# Patient Record
Sex: FEMALE | Race: WHITE | NOT HISPANIC OR LATINO | Employment: OTHER | URBAN - NONMETROPOLITAN AREA
[De-identification: names, ages, dates, MRNs, and addresses within clinical notes are randomized per-mention and may not be internally consistent; named-entity substitution may affect disease eponyms.]

---

## 2019-10-21 ENCOUNTER — LAB (OUTPATIENT)
Dept: LAB | Facility: OTHER | Age: 69
End: 2019-10-21

## 2019-10-21 ENCOUNTER — OFFICE VISIT (OUTPATIENT)
Dept: FAMILY MEDICINE CLINIC | Facility: CLINIC | Age: 69
End: 2019-10-21

## 2019-10-21 VITALS
BODY MASS INDEX: 34.09 KG/M2 | WEIGHT: 217.2 LBS | DIASTOLIC BLOOD PRESSURE: 90 MMHG | HEIGHT: 67 IN | SYSTOLIC BLOOD PRESSURE: 182 MMHG

## 2019-10-21 DIAGNOSIS — R35.1 NOCTURIA: ICD-10-CM

## 2019-10-21 DIAGNOSIS — M25.551 RIGHT HIP PAIN: ICD-10-CM

## 2019-10-21 DIAGNOSIS — I10 ESSENTIAL HYPERTENSION: Primary | ICD-10-CM

## 2019-10-21 DIAGNOSIS — E78.5 HYPERLIPIDEMIA, UNSPECIFIED HYPERLIPIDEMIA TYPE: ICD-10-CM

## 2019-10-21 DIAGNOSIS — E53.8 VITAMIN B12 DEFICIENCY: ICD-10-CM

## 2019-10-21 DIAGNOSIS — E03.9 HYPOTHYROIDISM, UNSPECIFIED TYPE: ICD-10-CM

## 2019-10-21 DIAGNOSIS — F32.1 CURRENT MODERATE EPISODE OF MAJOR DEPRESSIVE DISORDER WITHOUT PRIOR EPISODE (HCC): ICD-10-CM

## 2019-10-21 DIAGNOSIS — E55.9 VITAMIN D DEFICIENCY: ICD-10-CM

## 2019-10-21 LAB
ALBUMIN SERPL-MCNC: 4.2 G/DL (ref 3.5–5)
ALBUMIN/GLOB SERPL: 1.2 G/DL (ref 1.1–1.8)
ALP SERPL-CCNC: 63 U/L (ref 38–126)
ALT SERPL W P-5'-P-CCNC: 22 U/L
ANION GAP SERPL CALCULATED.3IONS-SCNC: 9 MMOL/L (ref 5–15)
AST SERPL-CCNC: 25 U/L (ref 14–36)
BACTERIA UR QL AUTO: ABNORMAL /HPF
BILIRUB SERPL-MCNC: 0.5 MG/DL (ref 0.2–1.3)
BILIRUB UR QL STRIP: NEGATIVE
BUN BLD-MCNC: 16 MG/DL (ref 7–23)
BUN/CREAT SERPL: 17.4 (ref 7–25)
CALCIUM SPEC-SCNC: 9.3 MG/DL (ref 8.4–10.2)
CHLORIDE SERPL-SCNC: 105 MMOL/L (ref 101–112)
CHOLEST SERPL-MCNC: 194 MG/DL (ref 150–200)
CLARITY UR: ABNORMAL
CO2 SERPL-SCNC: 29 MMOL/L (ref 22–30)
COLOR UR: YELLOW
CREAT BLD-MCNC: 0.92 MG/DL (ref 0.52–1.04)
DEPRECATED RDW RBC AUTO: 46.1 FL (ref 37–54)
EOSINOPHIL # BLD MANUAL: 0.36 10*3/MM3 (ref 0–0.4)
EOSINOPHIL NFR BLD MANUAL: 4 % (ref 0.3–6.2)
ERYTHROCYTE [DISTWIDTH] IN BLOOD BY AUTOMATED COUNT: 14.5 % (ref 12.3–15.4)
GFR SERPL CREATININE-BSD FRML MDRD: 61 ML/MIN/1.73 (ref 45–104)
GLOBULIN UR ELPH-MCNC: 3.4 GM/DL (ref 2.3–3.5)
GLUCOSE BLD-MCNC: 104 MG/DL (ref 70–99)
GLUCOSE UR STRIP-MCNC: NEGATIVE MG/DL
HCT VFR BLD AUTO: 42.8 % (ref 34–46.6)
HDLC SERPL-MCNC: 29 MG/DL (ref 40–59)
HGB BLD-MCNC: 13.6 G/DL (ref 12–15.9)
HGB UR QL STRIP.AUTO: NEGATIVE
HYALINE CASTS UR QL AUTO: ABNORMAL /LPF
KETONES UR QL STRIP: NEGATIVE
LDLC SERPL CALC-MCNC: 120 MG/DL
LDLC/HDLC SERPL: 4.13 {RATIO} (ref 0–3.22)
LEUKOCYTE ESTERASE UR QL STRIP.AUTO: ABNORMAL
LYMPHOCYTES # BLD MANUAL: 2.93 10*3/MM3 (ref 0.7–3.1)
LYMPHOCYTES NFR BLD MANUAL: 33 % (ref 19.6–45.3)
LYMPHOCYTES NFR BLD MANUAL: 6 % (ref 5–12)
MCH RBC QN AUTO: 28.2 PG (ref 26.6–33)
MCHC RBC AUTO-ENTMCNC: 31.8 G/DL (ref 31.5–35.7)
MCV RBC AUTO: 88.6 FL (ref 79–97)
MONOCYTES # BLD AUTO: 0.53 10*3/MM3 (ref 0.1–0.9)
MUCOUS THREADS URNS QL MICRO: ABNORMAL /HPF
NEUTROPHILS # BLD AUTO: 5.07 10*3/MM3 (ref 1.7–7)
NEUTROPHILS NFR BLD MANUAL: 57 % (ref 42.7–76)
NITRITE UR QL STRIP: NEGATIVE
PH UR STRIP.AUTO: 5.5 [PH] (ref 5.5–8)
PLATELET # BLD AUTO: 248 10*3/MM3 (ref 140–450)
PMV BLD AUTO: 9.5 FL (ref 6–12)
POTASSIUM BLD-SCNC: 4.6 MMOL/L (ref 3.4–5)
PROT SERPL-MCNC: 7.6 G/DL (ref 6.3–8.6)
PROT UR QL STRIP: NEGATIVE
RBC # BLD AUTO: 4.83 10*6/MM3 (ref 3.77–5.28)
RBC # UR: ABNORMAL /HPF
RBC MORPH BLD: NORMAL
REF LAB TEST METHOD: ABNORMAL
SMALL PLATELETS BLD QL SMEAR: ADEQUATE
SODIUM BLD-SCNC: 143 MMOL/L (ref 137–145)
SP GR UR STRIP: 1.02 (ref 1–1.03)
SQUAMOUS #/AREA URNS HPF: ABNORMAL /HPF
TRIGL SERPL-MCNC: 226 MG/DL
UROBILINOGEN UR QL STRIP: ABNORMAL
VLDLC SERPL-MCNC: 45.2 MG/DL
WBC MORPH BLD: NORMAL
WBC NRBC COR # BLD: 8.89 10*3/MM3 (ref 3.4–10.8)
WBC UR QL AUTO: ABNORMAL /HPF

## 2019-10-21 PROCEDURE — 96372 THER/PROPH/DIAG INJ SC/IM: CPT | Performed by: FAMILY MEDICINE

## 2019-10-21 PROCEDURE — 84443 ASSAY THYROID STIM HORMONE: CPT | Performed by: FAMILY MEDICINE

## 2019-10-21 PROCEDURE — 80061 LIPID PANEL: CPT | Performed by: FAMILY MEDICINE

## 2019-10-21 PROCEDURE — 80053 COMPREHEN METABOLIC PANEL: CPT | Performed by: FAMILY MEDICINE

## 2019-10-21 PROCEDURE — 87077 CULTURE AEROBIC IDENTIFY: CPT | Performed by: FAMILY MEDICINE

## 2019-10-21 PROCEDURE — 87086 URINE CULTURE/COLONY COUNT: CPT | Performed by: FAMILY MEDICINE

## 2019-10-21 PROCEDURE — 85025 COMPLETE CBC W/AUTO DIFF WBC: CPT | Performed by: FAMILY MEDICINE

## 2019-10-21 PROCEDURE — 82306 VITAMIN D 25 HYDROXY: CPT | Performed by: FAMILY MEDICINE

## 2019-10-21 PROCEDURE — 82607 VITAMIN B-12: CPT | Performed by: FAMILY MEDICINE

## 2019-10-21 PROCEDURE — 84439 ASSAY OF FREE THYROXINE: CPT | Performed by: FAMILY MEDICINE

## 2019-10-21 PROCEDURE — 99203 OFFICE O/P NEW LOW 30 MIN: CPT | Performed by: FAMILY MEDICINE

## 2019-10-21 PROCEDURE — 36415 COLL VENOUS BLD VENIPUNCTURE: CPT | Performed by: FAMILY MEDICINE

## 2019-10-21 PROCEDURE — 81001 URINALYSIS AUTO W/SCOPE: CPT | Performed by: FAMILY MEDICINE

## 2019-10-21 PROCEDURE — 82746 ASSAY OF FOLIC ACID SERUM: CPT | Performed by: FAMILY MEDICINE

## 2019-10-21 PROCEDURE — 87186 SC STD MICRODIL/AGAR DIL: CPT | Performed by: FAMILY MEDICINE

## 2019-10-21 RX ORDER — ESCITALOPRAM OXALATE 10 MG/1
10 TABLET ORAL DAILY
Qty: 30 TABLET | Refills: 5 | Status: SHIPPED | OUTPATIENT
Start: 2019-10-21 | End: 2020-04-22

## 2019-10-21 RX ORDER — LOVASTATIN 40 MG/1
40 TABLET ORAL NIGHTLY
COMMUNITY
End: 2020-03-18 | Stop reason: SDUPTHER

## 2019-10-21 RX ORDER — METHYLPREDNISOLONE ACETATE 80 MG/ML
80 INJECTION, SUSPENSION INTRA-ARTICULAR; INTRALESIONAL; INTRAMUSCULAR; SOFT TISSUE ONCE
Status: COMPLETED | OUTPATIENT
Start: 2019-10-21 | End: 2019-10-21

## 2019-10-21 RX ORDER — CHOLECALCIFEROL (VITAMIN D3) 1250 MCG
CAPSULE ORAL
COMMUNITY
End: 2019-10-22 | Stop reason: SDUPTHER

## 2019-10-21 RX ORDER — LEVOTHYROXINE SODIUM 137 UG/1
137 TABLET ORAL DAILY
COMMUNITY
End: 2020-03-03 | Stop reason: SDUPTHER

## 2019-10-21 RX ORDER — OLMESARTAN MEDOXOMIL 40 MG/1
40 TABLET ORAL DAILY
Qty: 30 TABLET | Refills: 5 | Status: SHIPPED | OUTPATIENT
Start: 2019-10-21 | End: 2020-01-27

## 2019-10-21 RX ADMIN — METHYLPREDNISOLONE ACETATE 80 MG: 80 INJECTION, SUSPENSION INTRA-ARTICULAR; INTRALESIONAL; INTRAMUSCULAR; SOFT TISSUE at 09:47

## 2019-10-21 NOTE — PROGRESS NOTES
Subjective   Marielle Hansen is a 69 y.o. female who presents to the office to establish care.  Her sister is with her today.  The patient's  and daughter passed away within the past couple of years.  She has recently moved in with her sister, who is a regular patient of mine and she is here to establish care.  They have a few concerns.  Her right hip is been giving her some pain and sometimes it goes down into the leg.  She had both knees replaced.  She wondered about getting a steroid shot as this usually helped with the knee pain.  She had been doing a lot of work while moving and thinks it may have flared it up.  She has had a significant weight gain and is not sleeping since she lost her daughter and .  She has not had labs done in a while and is due for them.  Her blood pressure has gone up significantly as her weight has.  She has a history of vitamin D and B12 deficiency.    She is been having to get up at night to urinate several times over the past few months.    History of Present Illness   Hypertension   This is a new problem. The current episode started in the past year. The problem has been waxing and waning since onset. Associated symptoms include anxiety, headaches and malaise/fatigue. Pertinent negatives include no blurred vision, chest pain, neck pain, orthopnea, palpitations, peripheral edema, PND, shortness of breath or sweats. There are no associated agents to hypertension. Risk factors for coronary artery disease include dyslipidemia and family history.  She had been on ACE inhibitor several years ago but it caused her to cough.    The following portions of the patient's history were reviewed and updated as appropriate: allergies, current medications, past family history, past medical history, past social history, past surgical history and problem list.    Review of Systems   Constitutional: Positive for activity change, fatigue and unexpected weight change.   HENT:  Negative.    Respiratory: Negative.  Negative for shortness of breath.    Cardiovascular: Negative.  Negative for chest pain.   Gastrointestinal: Negative.    Musculoskeletal: Positive for arthralgias, back pain, gait problem and myalgias.   Skin: Negative.    Allergic/Immunologic: Negative for immunocompromised state.   Neurological: Negative for dizziness, tremors, seizures, syncope, weakness and numbness.   Hematological: Negative.    Psychiatric/Behavioral: Positive for dysphoric mood and sleep disturbance. Negative for agitation and confusion. The patient is nervous/anxious.    All other systems reviewed and are negative.      Objective   Physical Exam   Constitutional: She is oriented to person, place, and time. She appears well-developed and well-nourished.   Obese.   HENT:   Head: Normocephalic and atraumatic.   Nose: Nose normal.   Mouth/Throat: Oropharynx is clear and moist.   Eyes: Conjunctivae and EOM are normal. Pupils are equal, round, and reactive to light.   Neck: Normal range of motion. Neck supple. No JVD present. No tracheal deviation present. No thyromegaly present.   Cardiovascular: Normal rate, regular rhythm, normal heart sounds and intact distal pulses.   No murmur heard.  Pulmonary/Chest: Effort normal and breath sounds normal. She has no wheezes.   Abdominal: Soft. Bowel sounds are normal. She exhibits no distension. There is no tenderness.   Musculoskeletal: Normal range of motion. She exhibits no edema.   Tender over the right hip, right low back.   Lymphadenopathy:     She has no cervical adenopathy.   Neurological: She is alert and oriented to person, place, and time. Coordination normal.   Skin: Skin is warm and dry. No rash noted.   Psychiatric: She has a normal mood and affect.   Nursing note and vitals reviewed.      Assessment/Plan   Marielle was seen today for establish care.    Diagnoses and all orders for this visit:    Essential hypertension  -     Comprehensive Metabolic  Panel    Hyperlipidemia, unspecified hyperlipidemia type  -     Lipid Panel; Future    Hypothyroidism, unspecified type  -     TSH  -     T4, Free    Right hip pain  -     XR Hip With or Without Pelvis 2 - 3 View Right; Future  -     methylPREDNISolone acetate (DEPO-medrol) injection 80 mg    Current moderate episode of major depressive disorder without prior episode (CMS/HCC)    Vitamin D deficiency  -     Vitamin D 25 Hydroxy    Vitamin B12 deficiency  -     CBC & Differential; Future  -     Vitamin B12 & Folate    Nocturia  -     Urinalysis With Culture If Indicated -; Future    Other orders  -     olmesartan (BENICAR) 40 MG tablet; Take 1 tablet by mouth Daily.  -     escitalopram (LEXAPRO) 10 MG tablet; Take 1 tablet by mouth Daily.    Will start on the losartan for hypertension.  Recheck with me in 4 weeks and they are to measure at home.    Suspect trochanteric bursitis and gave a handout on exercises.  Give Depo-Medrol 80 mg on the right side and she will check back with me in a month.  We will also x-ray the right hip and follow-up accordingly.    Recheck for lipids, hypothyroidism with labs today and for now we will continue current meds.    We will check a urinalysis as above and follow-up accordingly.  The nocturia and frequency may have something to do with her weight gain.    We will also recheck vitamin D and B12 due to deficiency.    We will start Lexapro for depression symptoms related to complicated bereavement after loss of her family members.        This document has been electronically signed by Yoselin Rubio MD on October 21, 2019 5:03 PM

## 2019-10-22 DIAGNOSIS — M19.90 ARTHRITIS: Primary | ICD-10-CM

## 2019-10-22 LAB
25(OH)D3 SERPL-MCNC: 26.7 NG/ML (ref 30–100)
FOLATE SERPL-MCNC: 9.28 NG/ML (ref 4.78–24.2)
T4 FREE SERPL-MCNC: 0.74 NG/DL (ref 0.93–1.7)
TSH SERPL DL<=0.05 MIU/L-ACNC: 5.4 UIU/ML (ref 0.27–4.2)
VIT B12 BLD-MCNC: 402 PG/ML (ref 211–946)

## 2019-10-22 RX ORDER — CHOLECALCIFEROL (VITAMIN D3) 1250 MCG
50000 CAPSULE ORAL 2 TIMES WEEKLY
Qty: 8 CAPSULE | Refills: 5 | Status: SHIPPED | OUTPATIENT
Start: 2019-10-24 | End: 2020-07-21 | Stop reason: SDUPTHER

## 2019-10-22 NOTE — PROGRESS NOTES
Notify patient Vitamin D is deficient.  Needs vitamin D 50,000 units twice weekly for at least 6 months and recheck.  Has she been on her thyroid medicine regularly?  Her thyroid is showing its underactive.  If she has not missed any then we need to increase the dose to 150 mcg daily.  B12 is low normal.  Could try monthly shots if she would like.  Cholesterol is a little high we will talk about this when she comes back for her next visit, may be changing medications.

## 2019-10-23 LAB — BACTERIA SPEC AEROBE CULT: ABNORMAL

## 2019-11-04 ENCOUNTER — OFFICE VISIT (OUTPATIENT)
Dept: FAMILY MEDICINE CLINIC | Facility: CLINIC | Age: 69
End: 2019-11-04

## 2019-11-04 VITALS
DIASTOLIC BLOOD PRESSURE: 84 MMHG | BODY MASS INDEX: 34.06 KG/M2 | HEIGHT: 67 IN | SYSTOLIC BLOOD PRESSURE: 162 MMHG | WEIGHT: 217 LBS

## 2019-11-04 DIAGNOSIS — I10 ESSENTIAL HYPERTENSION: Primary | ICD-10-CM

## 2019-11-04 DIAGNOSIS — E53.9 VITAMIN B DEFICIENCY: ICD-10-CM

## 2019-11-04 DIAGNOSIS — G47.00 INSOMNIA, UNSPECIFIED TYPE: ICD-10-CM

## 2019-11-04 PROCEDURE — 99214 OFFICE O/P EST MOD 30 MIN: CPT | Performed by: FAMILY MEDICINE

## 2019-11-04 PROCEDURE — 96372 THER/PROPH/DIAG INJ SC/IM: CPT | Performed by: FAMILY MEDICINE

## 2019-11-04 RX ORDER — RAMELTEON 8 MG/1
8 TABLET ORAL NIGHTLY
Qty: 30 TABLET | Refills: 5 | Status: SHIPPED | OUTPATIENT
Start: 2019-11-04 | End: 2019-11-25

## 2019-11-04 RX ORDER — METOPROLOL SUCCINATE 25 MG/1
25 TABLET, EXTENDED RELEASE ORAL DAILY
Qty: 30 TABLET | Refills: 5 | Status: SHIPPED | OUTPATIENT
Start: 2019-11-04 | End: 2019-12-04 | Stop reason: SDUPTHER

## 2019-11-04 RX ORDER — CYANOCOBALAMIN 1000 UG/ML
1000 INJECTION, SOLUTION INTRAMUSCULAR; SUBCUTANEOUS
Status: DISCONTINUED | OUTPATIENT
Start: 2019-11-04 | End: 2020-06-19

## 2019-11-04 RX ADMIN — CYANOCOBALAMIN 1000 MCG: 1000 INJECTION, SOLUTION INTRAMUSCULAR; SUBCUTANEOUS at 09:55

## 2019-11-04 NOTE — PROGRESS NOTES
Subjective   Marielle Hansen is a 69 y.o. female who presents to the office today for 2-week follow-up on hypertension.  Her blood pressure is still a bit elevated but is actually better.  She is having a lot of trouble sleeping and would like to try something for insomnia    History of Present Illness   Hypertension   This is a new problem. The current episode started in the past year. The problem has been waxing and waning since onset. Associated symptoms include anxiety, headaches and malaise/fatigue. Pertinent negatives include no blurred vision, chest pain, neck pain, orthopnea, palpitations, peripheral edema, PND, shortness of breath or sweats. There are no associated agents to hypertension. Risk factors for coronary artery disease include dyslipidemia and family history.  She had been on ACE inhibitor several years ago but it caused her to cough.    The following portions of the patient's history were reviewed and updated as appropriate: allergies, current medications, past family history, past medical history, past social history, past surgical history and problem list.    Review of Systems   Constitutional: Positive for activity change, fatigue and unexpected weight change.   HENT: Negative.    Respiratory: Negative.  Negative for shortness of breath.    Cardiovascular: Negative.  Negative for chest pain.   Gastrointestinal: Negative.    Musculoskeletal: Positive for arthralgias, back pain, gait problem and myalgias.   Skin: Negative.    Allergic/Immunologic: Negative for immunocompromised state.   Neurological: Negative for dizziness, tremors, seizures, syncope, weakness and numbness.   Hematological: Negative.    Psychiatric/Behavioral: Positive for dysphoric mood and sleep disturbance. Negative for agitation and confusion. The patient is nervous/anxious.    All other systems reviewed and are negative.      Objective    Vitals:    11/04/19 0904   BP: 162/84   Weight: 98.4 kg (217 lb)   Height: 170.2  "cm (67\")   PainSc: 0-No pain     BMI 34  Physical Exam   Constitutional: She is oriented to person, place, and time. She appears well-developed and well-nourished.   Obese.   HENT:   Head: Normocephalic and atraumatic.   Nose: Nose normal.   Mouth/Throat: Oropharynx is clear and moist.   Eyes: Conjunctivae and EOM are normal. Pupils are equal, round, and reactive to light.   Neck: Normal range of motion. Neck supple. No JVD present. No tracheal deviation present. No thyromegaly present.   Cardiovascular: Normal rate, regular rhythm, normal heart sounds and intact distal pulses.   No murmur heard.  Pulmonary/Chest: Effort normal and breath sounds normal. She has no wheezes.   Abdominal: Soft. Bowel sounds are normal. She exhibits no distension. There is no tenderness.   Musculoskeletal: Normal range of motion. She exhibits no edema.   Tender over the right hip, right low back.   Lymphadenopathy:     She has no cervical adenopathy.   Neurological: She is alert and oriented to person, place, and time. Coordination normal.   Skin: Skin is warm and dry. No rash noted.   Psychiatric: She has a normal mood and affect.   Nursing note and vitals reviewed.      Assessment/Plan   Marielle was seen today for follow-up.    Diagnoses and all orders for this visit:    Essential hypertension    Insomnia, unspecified type    Vitamin B deficiency  -     cyanocobalamin injection 1,000 mcg    Other orders  -     ramelteon (ROZEREM) 8 MG tablet; Take 1 tablet by mouth Every Night.  -     metoprolol succinate XL (TOPROL XL) 25 MG 24 hr tablet; Take 1 tablet by mouth Daily.    Will add metoprolol XL for hypertension and recheck again in 3 weeks.    We will add Rozerem for insomnia    Continue B12 shots scheduled.        This document has been electronically signed by Yoselin Rubio MD on November 4, 2019 9:29 AM           "

## 2019-11-06 ENCOUNTER — OFFICE VISIT (OUTPATIENT)
Dept: ORTHOPEDIC SURGERY | Facility: CLINIC | Age: 69
End: 2019-11-06

## 2019-11-06 VITALS — HEIGHT: 67 IN | WEIGHT: 215.4 LBS | BODY MASS INDEX: 33.81 KG/M2

## 2019-11-06 DIAGNOSIS — M54.50 LOW BACK PAIN, UNSPECIFIED BACK PAIN LATERALITY, UNSPECIFIED CHRONICITY, UNSPECIFIED WHETHER SCIATICA PRESENT: ICD-10-CM

## 2019-11-06 DIAGNOSIS — M48.061 SPINAL STENOSIS OF LUMBAR REGION, UNSPECIFIED WHETHER NEUROGENIC CLAUDICATION PRESENT: ICD-10-CM

## 2019-11-06 DIAGNOSIS — M25.551 RIGHT HIP PAIN: Primary | ICD-10-CM

## 2019-11-06 DIAGNOSIS — M16.11 PRIMARY OSTEOARTHRITIS OF RIGHT HIP: ICD-10-CM

## 2019-11-06 PROCEDURE — 99203 OFFICE O/P NEW LOW 30 MIN: CPT | Performed by: ORTHOPAEDIC SURGERY

## 2019-11-06 NOTE — PROGRESS NOTES
Marielle Hansen is a 69 y.o. female   Primary provider:  Yoselin Rubio MD       Chief Complaint   Patient presents with   • Right Hip - Pain       HISTORY OF PRESENT ILLNESS: Patient is here today for right hip pain. Patient had xrays prior to appointment today. She states that her pain is not constant but at times so bad it makes her nauseated.  This been on for quite some time and limits her walking.  Is worse with weightbearing and somewhat better with rest.  She has to walk for a distance look at the store she describes bending over her grocery cart which gives her relief.  She quit smoking many years ago.  She has pain in the outer aspect of her right leg rating up to her back.  Minimal problem on the left side.    History of Present Illness     CONCURRENT MEDICAL HISTORY:    No past medical history on file.    No Known Allergies      Current Outpatient Medications:   •  Cholecalciferol (VITAMIN D3) 1.25 MG (76480 UT) capsule, Take 1 capsule by mouth 2 (Two) Times a Week., Disp: 8 capsule, Rfl: 5  •  escitalopram (LEXAPRO) 10 MG tablet, Take 1 tablet by mouth Daily., Disp: 30 tablet, Rfl: 5  •  levothyroxine (SYNTHROID, LEVOTHROID) 137 MCG tablet, Take 137 mcg by mouth Daily., Disp: , Rfl:   •  lovastatin (MEVACOR) 40 MG tablet, Take 40 mg by mouth Every Night., Disp: , Rfl:   •  metoprolol succinate XL (TOPROL XL) 25 MG 24 hr tablet, Take 1 tablet by mouth Daily., Disp: 30 tablet, Rfl: 5  •  olmesartan (BENICAR) 40 MG tablet, Take 1 tablet by mouth Daily., Disp: 30 tablet, Rfl: 5  •  ramelteon (ROZEREM) 8 MG tablet, Take 1 tablet by mouth Every Night., Disp: 30 tablet, Rfl: 5    Current Facility-Administered Medications:   •  cyanocobalamin injection 1,000 mcg, 1,000 mcg, Intramuscular, Q28 Days, Yoselin Rubio MD, 1,000 mcg at 11/04/19 0955    No past surgical history on file.    No family history on file.    Social History     Socioeconomic History   • Marital status:      Spouse name:  "Not on file   • Number of children: Not on file   • Years of education: Not on file   • Highest education level: Not on file        Review of Systems   Constitutional: Positive for unexpected weight change. Negative for chills and fever.   HENT: Negative.  Negative for facial swelling.    Eyes: Negative.    Respiratory: Negative.  Negative for apnea and shortness of breath.    Cardiovascular: Negative.  Negative for chest pain and leg swelling.   Gastrointestinal: Negative.  Negative for abdominal pain, nausea and vomiting.   Endocrine: Negative.    Genitourinary: Negative.  Negative for dysuria.   Musculoskeletal: Positive for arthralgias, gait problem and joint swelling.   Skin: Negative.  Negative for color change.   Allergic/Immunologic: Negative.    Neurological: Negative for seizures and syncope.   Hematological: Negative.  Negative for adenopathy.   Psychiatric/Behavioral: Positive for sleep disturbance. Negative for dysphoric mood. The patient is nervous/anxious.          PHYSICAL EXAMINATION:       Ht 170.2 cm (67\")   Wt 97.7 kg (215 lb 6.4 oz)   BMI 33.74 kg/m²     Physical Exam   Constitutional: She is oriented to person, place, and time. She appears well-developed.   HENT:   Head: Normocephalic and atraumatic.   Eyes: EOM are normal. Pupils are equal, round, and reactive to light.   Neck: Neck supple.   Pulmonary/Chest: Effort normal.   Musculoskeletal: She exhibits tenderness.   Neurological: She is alert and oriented to person, place, and time.   Skin: Skin is warm and dry.   Psychiatric: She has a normal mood and affect.       GAIT:     []  Normal  []  Antalgic    Assistive device: [x]  None  []  Walker     []  Crutches  []  Cane     []  Wheelchair  []  Stretcher    Ortho Exam  Pain with flexion internal rotation of the right hip.  No real pain with flexion internal rotation left hip.  Straight leg raising is negative.  Good strength the plantar flexors and dorsiflexors.  Dorsalis pedis pulses 2+.  " Good capillary refill distally.  Pain with extension of the back pain with lateral bending.  Forward flexion is to the distal tibia.        Xr Hip With Or Without Pelvis 2 - 3 View Right    Result Date: 10/21/2019  Narrative: Procedure:  Right hip    , pelvis Indication:  Right hip pain   . Technique:  Two views right hip. Single frontal view pelvis.   . Prior relevant exam:  None. Arthritic changes right hip with uniform joint space narrowing and osteophytes arising from the femoral head. The bony pelvis is unremarkable. Degenerative changes lumbar spine also noted.     Impression: CONCLUSION: Moderate arthritic changes right hip. Right hip is otherwise unremarkable. Electronically signed by:  Neno Maria MD  10/21/2019 5:08 PM CDT Workstation: [id number]    X-rays are reviewed she has arthritic change in her right hip and also of the left hip which actually appears a little worse than the right.  There is spurring on the lateral edge.  There is incidental view of her lumbar spine that show significant arthritic change the lateral lumbar spine from about L3-L5.      ASSESSMENT:    Diagnoses and all orders for this visit:    Right hip pain  -     CT Injection/Aspriation Large Joint or Bursa; Future    Low back pain, unspecified back pain laterality, unspecified chronicity, unspecified whether sciatica present  -     MRI Lumbar Spine Without Contrast; Future    Spinal stenosis of lumbar region, unspecified whether neurogenic claudication present  -     MRI Lumbar Spine Without Contrast; Future    Primary osteoarthritis of right hip          PLAN I think at this point she has 2 problems.  She has a classic claudication symptoms which I believe her neurogenic.  She has good pulses quit smoking long time ago.  She describes a bending over her grocery cart.  I think she also has some degree of arthritic change the right hip radiographically.  It is actually worse on the left side the right is symptomatic.  I am going to  send her for intra-articular injection of the right hip to see how much of her symptoms go away.  I am also to proceed with MRI scan of the lumbar spine to evaluate spinal stenosis.    No Follow-up on file.      This document has been electronically signed by Cedric Henley MD on November 6, 2019 3:20 PM

## 2019-11-14 ENCOUNTER — HOSPITAL ENCOUNTER (OUTPATIENT)
Dept: CT IMAGING | Facility: HOSPITAL | Age: 69
Discharge: HOME | End: 2019-11-14
Admitting: ORTHOPAEDIC SURGERY

## 2019-11-14 ENCOUNTER — HOSPITAL ENCOUNTER (OUTPATIENT)
Dept: MRI IMAGING | Facility: HOSPITAL | Age: 69
Discharge: HOME | End: 2019-11-14

## 2019-11-14 VITALS
SYSTOLIC BLOOD PRESSURE: 170 MMHG | RESPIRATION RATE: 18 BRPM | HEART RATE: 67 BPM | DIASTOLIC BLOOD PRESSURE: 81 MMHG | OXYGEN SATURATION: 95 %

## 2019-11-14 DIAGNOSIS — M25.551 RIGHT HIP PAIN: ICD-10-CM

## 2019-11-14 DIAGNOSIS — M48.061 SPINAL STENOSIS OF LUMBAR REGION, UNSPECIFIED WHETHER NEUROGENIC CLAUDICATION PRESENT: ICD-10-CM

## 2019-11-14 DIAGNOSIS — M54.50 LOW BACK PAIN, UNSPECIFIED BACK PAIN LATERALITY, UNSPECIFIED CHRONICITY, UNSPECIFIED WHETHER SCIATICA PRESENT: ICD-10-CM

## 2019-11-14 PROCEDURE — 77012 CT SCAN FOR NEEDLE BIOPSY: CPT

## 2019-11-14 PROCEDURE — 72148 MRI LUMBAR SPINE W/O DYE: CPT

## 2019-11-14 PROCEDURE — 25010000002 TRIAMCINOLONE PER 10 MG: Performed by: RADIOLOGY

## 2019-11-14 RX ORDER — LIDOCAINE HYDROCHLORIDE 20 MG/ML
INJECTION, SOLUTION INFILTRATION; PERINEURAL
Status: COMPLETED | OUTPATIENT
Start: 2019-11-14 | End: 2019-11-14

## 2019-11-14 RX ORDER — BUPIVACAINE HYDROCHLORIDE 5 MG/ML
INJECTION, SOLUTION PERINEURAL
Status: COMPLETED | OUTPATIENT
Start: 2019-11-14 | End: 2019-11-14

## 2019-11-14 RX ORDER — TRIAMCINOLONE ACETONIDE 40 MG/ML
80 INJECTION, SUSPENSION INTRA-ARTICULAR; INTRAMUSCULAR ONCE
Status: COMPLETED | OUTPATIENT
Start: 2019-11-14 | End: 2019-11-14

## 2019-11-14 RX ORDER — BUPIVACAINE HYDROCHLORIDE 5 MG/ML
2 INJECTION, SOLUTION EPIDURAL; INTRACAUDAL ONCE
Status: DISCONTINUED | OUTPATIENT
Start: 2019-11-14 | End: 2019-11-15 | Stop reason: HOSPADM

## 2019-11-14 RX ADMIN — TRIAMCINOLONE ACETONIDE 80 MG: 40 INJECTION, SUSPENSION INTRA-ARTICULAR; INTRAMUSCULAR at 14:29

## 2019-11-14 RX ADMIN — BUPIVACAINE HYDROCHLORIDE 3 ML: 5 INJECTION, SOLUTION PERINEURAL at 14:25

## 2019-11-14 RX ADMIN — LIDOCAINE HYDROCHLORIDE 2 ML: 20 INJECTION, SOLUTION INFILTRATION; PERINEURAL at 14:25

## 2019-11-14 NOTE — POST-PROCEDURE NOTE
Pre-Op Diagnosis: pain, M25.551 Pain in right hip  Post-Op Diagnosis: same  Procedure: CT GUIDANCE NEEDLE PLACEMENT  Anesthesia: 2% local lidocaine  EBL: None  Findings: Right hip injection. See details on PACS.  Complications: No immediate  Disposition:  Patient tolerated the procedure well

## 2019-11-25 ENCOUNTER — PRIOR AUTHORIZATION (OUTPATIENT)
Dept: FAMILY MEDICINE CLINIC | Facility: CLINIC | Age: 69
End: 2019-11-25

## 2019-11-25 RX ORDER — TRAZODONE HYDROCHLORIDE 150 MG/1
150 TABLET ORAL NIGHTLY
Qty: 30 TABLET | Refills: 5 | Status: SHIPPED | OUTPATIENT
Start: 2019-11-25 | End: 2020-02-06

## 2019-12-04 ENCOUNTER — OFFICE VISIT (OUTPATIENT)
Dept: FAMILY MEDICINE CLINIC | Facility: CLINIC | Age: 69
End: 2019-12-04

## 2019-12-04 ENCOUNTER — LAB (OUTPATIENT)
Dept: LAB | Facility: OTHER | Age: 69
End: 2019-12-04

## 2019-12-04 ENCOUNTER — OFFICE VISIT (OUTPATIENT)
Dept: ORTHOPEDIC SURGERY | Facility: CLINIC | Age: 69
End: 2019-12-04

## 2019-12-04 VITALS
OXYGEN SATURATION: 98 % | WEIGHT: 214 LBS | TEMPERATURE: 98.6 F | HEART RATE: 64 BPM | BODY MASS INDEX: 33.59 KG/M2 | HEIGHT: 67 IN | DIASTOLIC BLOOD PRESSURE: 94 MMHG | SYSTOLIC BLOOD PRESSURE: 178 MMHG

## 2019-12-04 VITALS — WEIGHT: 214 LBS | HEIGHT: 67 IN | BODY MASS INDEX: 33.59 KG/M2

## 2019-12-04 DIAGNOSIS — M16.11 PRIMARY OSTEOARTHRITIS OF RIGHT HIP: ICD-10-CM

## 2019-12-04 DIAGNOSIS — E53.8 VITAMIN B12 DEFICIENCY: ICD-10-CM

## 2019-12-04 DIAGNOSIS — I10 ESSENTIAL HYPERTENSION: Primary | ICD-10-CM

## 2019-12-04 DIAGNOSIS — M48.061 SPINAL STENOSIS OF LUMBAR REGION, UNSPECIFIED WHETHER NEUROGENIC CLAUDICATION PRESENT: ICD-10-CM

## 2019-12-04 DIAGNOSIS — M25.551 RIGHT HIP PAIN: Primary | ICD-10-CM

## 2019-12-04 DIAGNOSIS — M54.50 LOW BACK PAIN, UNSPECIFIED BACK PAIN LATERALITY, UNSPECIFIED CHRONICITY, UNSPECIFIED WHETHER SCIATICA PRESENT: ICD-10-CM

## 2019-12-04 DIAGNOSIS — R19.7 DIARRHEA, UNSPECIFIED TYPE: ICD-10-CM

## 2019-12-04 LAB
ALBUMIN SERPL-MCNC: 4.3 G/DL (ref 3.5–5)
ALBUMIN/GLOB SERPL: 1.3 G/DL (ref 1.1–1.8)
ALP SERPL-CCNC: 55 U/L (ref 38–126)
ALT SERPL W P-5'-P-CCNC: 21 U/L
ANION GAP SERPL CALCULATED.3IONS-SCNC: 8 MMOL/L (ref 5–15)
AST SERPL-CCNC: 22 U/L (ref 14–36)
BASOPHILS # BLD AUTO: 0.03 10*3/MM3 (ref 0–0.2)
BASOPHILS NFR BLD AUTO: 0.4 % (ref 0–1.5)
BILIRUB SERPL-MCNC: 0.3 MG/DL (ref 0.2–1.3)
BUN BLD-MCNC: 13 MG/DL (ref 7–23)
BUN/CREAT SERPL: 12.9 (ref 7–25)
CALCIUM SPEC-SCNC: 9.5 MG/DL (ref 8.4–10.2)
CHLORIDE SERPL-SCNC: 105 MMOL/L (ref 101–112)
CO2 SERPL-SCNC: 30 MMOL/L (ref 22–30)
CREAT BLD-MCNC: 1.01 MG/DL (ref 0.52–1.04)
DEPRECATED RDW RBC AUTO: 47.5 FL (ref 37–54)
EOSINOPHIL # BLD AUTO: 0.19 10*3/MM3 (ref 0–0.4)
EOSINOPHIL NFR BLD AUTO: 2.4 % (ref 0.3–6.2)
ERYTHROCYTE [DISTWIDTH] IN BLOOD BY AUTOMATED COUNT: 14.9 % (ref 12.3–15.4)
GFR SERPL CREATININE-BSD FRML MDRD: 54 ML/MIN/1.73 (ref 45–104)
GLOBULIN UR ELPH-MCNC: 3.4 GM/DL (ref 2.3–3.5)
GLUCOSE BLD-MCNC: 103 MG/DL (ref 70–99)
HCT VFR BLD AUTO: 42.8 % (ref 34–46.6)
HGB BLD-MCNC: 13.5 G/DL (ref 12–15.9)
LYMPHOCYTES # BLD AUTO: 2.14 10*3/MM3 (ref 0.7–3.1)
LYMPHOCYTES NFR BLD AUTO: 27.2 % (ref 19.6–45.3)
MCH RBC QN AUTO: 28.4 PG (ref 26.6–33)
MCHC RBC AUTO-ENTMCNC: 31.5 G/DL (ref 31.5–35.7)
MCV RBC AUTO: 90.1 FL (ref 79–97)
MONOCYTES # BLD AUTO: 0.54 10*3/MM3 (ref 0.1–0.9)
MONOCYTES NFR BLD AUTO: 6.9 % (ref 5–12)
NEUTROPHILS # BLD AUTO: 4.96 10*3/MM3 (ref 1.7–7)
NEUTROPHILS NFR BLD AUTO: 63.1 % (ref 42.7–76)
PLATELET # BLD AUTO: 223 10*3/MM3 (ref 140–450)
PMV BLD AUTO: 9 FL (ref 6–12)
POTASSIUM BLD-SCNC: 4.6 MMOL/L (ref 3.4–5)
PROT SERPL-MCNC: 7.7 G/DL (ref 6.3–8.6)
RBC # BLD AUTO: 4.75 10*6/MM3 (ref 3.77–5.28)
SODIUM BLD-SCNC: 143 MMOL/L (ref 137–145)
WBC NRBC COR # BLD: 7.86 10*3/MM3 (ref 3.4–10.8)

## 2019-12-04 PROCEDURE — 80053 COMPREHEN METABOLIC PANEL: CPT | Performed by: FAMILY MEDICINE

## 2019-12-04 PROCEDURE — 96372 THER/PROPH/DIAG INJ SC/IM: CPT | Performed by: FAMILY MEDICINE

## 2019-12-04 PROCEDURE — 85025 COMPLETE CBC W/AUTO DIFF WBC: CPT | Performed by: FAMILY MEDICINE

## 2019-12-04 PROCEDURE — 99214 OFFICE O/P EST MOD 30 MIN: CPT | Performed by: FAMILY MEDICINE

## 2019-12-04 PROCEDURE — 99213 OFFICE O/P EST LOW 20 MIN: CPT | Performed by: ORTHOPAEDIC SURGERY

## 2019-12-04 PROCEDURE — 36415 COLL VENOUS BLD VENIPUNCTURE: CPT | Performed by: FAMILY MEDICINE

## 2019-12-04 RX ORDER — METOPROLOL SUCCINATE 50 MG/1
50 TABLET, EXTENDED RELEASE ORAL DAILY
Qty: 30 TABLET | Refills: 5 | Status: SHIPPED | OUTPATIENT
Start: 2019-12-04 | End: 2019-12-18 | Stop reason: SDUPTHER

## 2019-12-04 RX ADMIN — CYANOCOBALAMIN 1000 MCG: 1000 INJECTION, SOLUTION INTRAMUSCULAR; SUBCUTANEOUS at 11:39

## 2019-12-04 NOTE — PROGRESS NOTES
Notify patient test results are ok.  The blood work is okay, waiting on the stool cultures which we should have by Friday.

## 2019-12-04 NOTE — PROGRESS NOTES
Subjective   Marielle Hansen is a 69 y.o. female who presents to the office today for a 1 month follow-up on hypertension and new onset diarrhea.     Hypertension   Associated symptoms include headaches. Pertinent negatives include no chest pain or shortness of breath.   Hyperlipidemia   Pertinent negatives include no chest pain or shortness of breath.      Hypertension   This is a new problem. The current episode started in the past year. The problem has been waxing and waning since onset. Associated symptoms include anxiety, headaches and malaise/fatigue. Pertinent negatives include no blurred vision, chest pain, neck pain, orthopnea, palpitations, peripheral edema, PND, shortness of breath or sweats. There are no associated agents to hypertension. Risk factors for coronary artery disease include dyslipidemia and family history.  She had been on ACE inhibitor several years ago but it caused her to cough.    The following portions of the patient's history were reviewed and updated as appropriate: allergies, current medications, past family history, past medical history, past social history, past surgical history and problem list.    Review of Systems   Constitutional: Positive for appetite change. Negative for chills, fatigue, fever and unexpected weight change.   Respiratory: Negative.  Negative for shortness of breath.    Cardiovascular: Negative.  Negative for chest pain.   Gastrointestinal: Positive for blood in stool and diarrhea. Negative for vomiting.   Genitourinary: Negative for hematuria.   Musculoskeletal: Positive for back pain.   Skin: Negative.    Allergic/Immunologic: Negative for immunocompromised state.   Neurological: Positive for headaches. Negative for dizziness, tremors, seizures, syncope, weakness, light-headedness and numbness.   Hematological: Negative.    Psychiatric/Behavioral: Positive for dysphoric mood and sleep disturbance. Negative for agitation and confusion. The patient is  "nervous/anxious.    All other systems reviewed and are negative.      Objective    Vitals:    12/04/19 1046   BP: 178/94   Pulse: 64   Temp: 98.6 °F (37 °C)   TempSrc: Oral   SpO2: 98%   Weight: 97.1 kg (214 lb)   Height: 170.2 cm (67\")   PainSc: 0-No pain     BMI 34  Physical Exam   Constitutional: She is oriented to person, place, and time. She appears well-developed and well-nourished.   Obese.   HENT:   Head: Normocephalic and atraumatic.   Nose: Nose normal.   Mouth/Throat: Oropharynx is clear and moist.   Eyes: Conjunctivae and EOM are normal. Pupils are equal, round, and reactive to light.   Neck: Normal range of motion. Neck supple. No JVD present. No tracheal deviation present. No thyromegaly present.   Cardiovascular: Normal rate, regular rhythm, normal heart sounds and intact distal pulses.   No murmur heard.  Pulmonary/Chest: Effort normal and breath sounds normal. She has no wheezes.   Abdominal: Soft. Bowel sounds are normal. She exhibits no distension. There is no tenderness.   Musculoskeletal: Normal range of motion. She exhibits no edema.   Tender over the right hip, right low back.   Lymphadenopathy:     She has no cervical adenopathy.   Neurological: She is alert and oriented to person, place, and time. Coordination normal.   Skin: Skin is warm and dry. No rash noted.   Psychiatric: She has a normal mood and affect.   Nursing note and vitals reviewed.      Assessment/Plan   There are no diagnoses linked to this encounter.        This document has been electronically signed by Nereyda Mcguire, Medical Student on December 4, 2019 11:00 AM           "

## 2019-12-04 NOTE — PROGRESS NOTES
"Subjective   Marielle Hansen is a 69 y.o. female who presents to the office today for a 1 month follow-up on hypertension and new onset diarrhea. Patient reports taking her blood pressure at home, and had a systolic reading of 189 last week. She denies feeling lightheaded and dizzy, but reports headaches that will wake her up at night. She has tried Tylenol with little improvement. The diarrhea began 9 days ago, and is loose, yellow-brown with mucus. Patient reports an episode of hematochezia that started after the diarrhea began. She noticed \"a lot of red blood\" with the bowel movement. She denies hematuria, fever, chills, and vomiting. She reports \"feeling sluggish\" and decreased appetite. She has continued to drink and tolerate fluids. She was checked for hemorrhoids < 1 year ago with negative result. Patient reports significant stress in her life at this time. Her daughter passed away this last April 2019, and her brother-in-law has been hospitalized for the last few weeks.     History of Present Illness   Hypertension   This is a new problem. The current episode started in the past year. The problem has been waxing and waning since onset. Associated symptoms include anxiety, headaches and malaise/fatigue. Pertinent negatives include no blurred vision, chest pain, neck pain, orthopnea, palpitations, peripheral edema, PND, shortness of breath or sweats. There are no associated agents to hypertension. Risk factors for coronary artery disease include dyslipidemia and family history.  She had been on ACE inhibitor several years ago but it caused her to cough.    The following portions of the patient's history were reviewed and updated as appropriate: allergies, current medications, past family history, past medical history, past social history, past surgical history and problem list.    Review of Systems   Constitutional: Positive for appetite change. Negative for chills and fever.   Respiratory: Negative.  " "  Cardiovascular: Negative.    Gastrointestinal: Positive for blood in stool and diarrhea. Negative for nausea and vomiting.   Genitourinary: Negative for hematuria and vaginal bleeding.   Musculoskeletal: Positive for arthralgias, back pain and gait problem.   Skin: Negative.    Allergic/Immunologic: Negative for immunocompromised state.   Neurological: Negative for dizziness, tremors, seizures, syncope, weakness, light-headedness and numbness.   Hematological: Negative.    Psychiatric/Behavioral: Positive for dysphoric mood and sleep disturbance. Negative for agitation and confusion. The patient is nervous/anxious.    All other systems reviewed and are negative.      Objective    Vitals:    12/04/19 1046   BP: 178/94   Pulse: 64   Temp: 98.6 °F (37 °C)   TempSrc: Oral   SpO2: 98%   Weight: 97.1 kg (214 lb)   Height: 170.2 cm (67\")   PainSc: 0-No pain   Body mass index is 33.52 kg/m².    Physical Exam   Constitutional: She is oriented to person, place, and time. She appears well-developed and well-nourished.   Obese.   HENT:   Head: Normocephalic and atraumatic.   Nose: Nose normal.   Mouth/Throat: Oropharynx is clear and moist.   Eyes: Conjunctivae and EOM are normal. Pupils are equal, round, and reactive to light.   Neck: Normal range of motion. Neck supple. No JVD present. No tracheal deviation present. No thyromegaly present.   Cardiovascular: Normal rate, regular rhythm, normal heart sounds and intact distal pulses.   No murmur heard.  Pulmonary/Chest: Effort normal and breath sounds normal. She has no wheezes.   Abdominal: Soft. Bowel sounds are normal. She exhibits no distension. There is no tenderness.   Musculoskeletal: Normal range of motion. She exhibits no edema.   Tender over the right hip, right low back.   Lymphadenopathy:     She has no cervical adenopathy.   Neurological: She is alert and oriented to person, place, and time. Coordination normal.   Skin: Skin is warm and dry. No rash noted. "   Psychiatric: She has a normal mood and affect.   Nursing note and vitals reviewed.      Assessment/Plan   Marielle was seen today for hypertension and hyperlipidemia.    Diagnoses and all orders for this visit:    Essential hypertension    Diarrhea, unspecified type  -     Comprehensive Metabolic Panel  -     CBC & Differential; Future  -     Gastrointestinal Panel, PCR - Stool, Per Rectum; Future  -     Clostridium Difficile Toxin, PCR - Stool, Per Rectum; Future    Vitamin B12 deficiency    BMI 33.0-33.9,adult    Other orders  -     metoprolol succinate XL (TOPROL-XL) 50 MG 24 hr tablet; Take 1 tablet by mouth Daily.    Will increase dosage of  metoprolol XL to 50 mg for hypertension and recheck again in 2 weeks.    CMP, CBC & Differential, GI panel, and C Diff toxin ordered to look for bacterial vs viral etiology of enteritis.     Continue B12 shots scheduled.    Patient's Body mass index is 33.52 kg/m². BMI is above normal parameters. Recommendations include: nutrition counseling.        This document has been electronically signed by Nereyda Mcguire, Medical Student on December 4, 2019 11:34 AM

## 2019-12-04 NOTE — PROGRESS NOTES
"Marielle Hansen is a 69 y.o. female returns for     Chief Complaint   Patient presents with   • Right Hip - Follow-up   • Lumbar Spine - Follow-up   • Results       HISTORY OF PRESENT ILLNESS: Patient being seen for right hip and low back follow up. CT injection and MRI done at , would like to discuss findings.  She reports 4 to 5 days relief somewhat of her hip after the injection the right really did not help now she is about the same as she was it seems to radiate down her hip down her lower thigh.  Is always on the right side hurts with standing and walking somewhat better when sitting and bending forward.  We have also reviewed her MRI scan as above.       CONCURRENT MEDICAL HISTORY:    History reviewed. No pertinent past medical history.    No Known Allergies      Current Outpatient Medications:   •  Cholecalciferol (VITAMIN D3) 1.25 MG (67185 UT) capsule, Take 1 capsule by mouth 2 (Two) Times a Week., Disp: 8 capsule, Rfl: 5  •  escitalopram (LEXAPRO) 10 MG tablet, Take 1 tablet by mouth Daily., Disp: 30 tablet, Rfl: 5  •  levothyroxine (SYNTHROID, LEVOTHROID) 137 MCG tablet, Take 137 mcg by mouth Daily., Disp: , Rfl:   •  lovastatin (MEVACOR) 40 MG tablet, Take 40 mg by mouth Every Night., Disp: , Rfl:   •  metoprolol succinate XL (TOPROL-XL) 50 MG 24 hr tablet, Take 1 tablet by mouth Daily., Disp: 30 tablet, Rfl: 5  •  olmesartan (BENICAR) 40 MG tablet, Take 1 tablet by mouth Daily., Disp: 30 tablet, Rfl: 5  •  traZODone (DESYREL) 150 MG tablet, Take 1 tablet by mouth Every Night., Disp: 30 tablet, Rfl: 5    Current Facility-Administered Medications:   •  cyanocobalamin injection 1,000 mcg, 1,000 mcg, Intramuscular, Q28 Days, Yoselin Rubio MD, 1,000 mcg at 12/04/19 1139    History reviewed. No pertinent surgical history.        ROS: Review of systems has been updated as of today's date.  All other systems are negative except as noted previously.    PHYSICAL EXAMINATION:       Ht 170.2 cm (67\")  "  Wt 97.1 kg (214 lb)   BMI 33.52 kg/m²     Physical Exam   Constitutional: She is oriented to person, place, and time. She appears well-developed.   HENT:   Head: Normocephalic and atraumatic.   Eyes: EOM are normal. Pupils are equal, round, and reactive to light.   Neck: Neck supple.   Pulmonary/Chest: Effort normal.   Musculoskeletal: She exhibits tenderness.   Neurological: She is alert and oriented to person, place, and time. No sensory deficit.   Skin: Skin is warm and dry.   Psychiatric: She has a normal mood and affect.       GAIT:     [x]  Normal  []  Antalgic    Assistive device: [x]  None  []  Walker     []  Crutches  []  Cane     []  Wheelchair  []  Stretcher    Ortho Exam  Hip range of motion is minimally painful.  She is tender sciatic notch right greater than left pain with extension of her spine.  Appears grossly neurologically intact.    Mri Lumbar Spine Without Contrast    Result Date: 11/14/2019  Narrative: EXAM DESCRIPTION: MRI LUMBAR SPINE WO CONTRAST CLINICAL HISTORY: 69-year-old female complains of low back pain that radiates down to right hip. No prior surgery. COMPARISON: None FINDINGS:  Sagittal and axial T1 and T2 MR images of lumbar spine are submitted for interpretation . There are bilateral tiny-small renal cortical cysts present. No incidental acute or suspicious finding within the included paraspinal soft tissues. There is anatomic alignment of the lumbar and included lower thoracic vertebra. No compression fracture. There is generalized mild heterogeneity of the bone marrow signal without a focal suspicious mass or STIR signal abnormality. The conus terminates at the mid L2 level and demonstrates normal signal and morphology. Loss of T2 disc signal at all levels. Mild loss of disc space height at L1-2, L2-3 and L5-S1. T12-L1:  No significant disc bulge, protrusion, or stenosis. Facet arthropathy is present. L1-L2:  Mild disc bulge without protrusion or central spinal canal stenosis.  Facet arthropathy. No significant foraminal stenosis. L2-L3:  There is disc bulge with a small left subarticular protrusion asymmetrically indenting the ventral thecal sac without central spinal canal stenosis. Extends to the inferior margin of the left foramen without stenosis or compromise of the exiting L2 nerve root. The right foramina is widely patent. Facet arthropathy is present. L3-L4: Minimal generalized disc bulge without protrusion or stenosis. Facet arthropathy and mild thickening of the ligamentum flavum. L4-L5: Mild disc bulge without protrusion or stenosis. There is facet arthropathy and thickening of the ligamentum flavum. Minimal disc encroachment upon the inferior foramina without significant stenosis or compromise of exiting nerve roots. L5-S1: Minimal bulge without protrusion or central spinal canal stenosis. There is facet arthropathy. The foramina are adequate.     Impression: There is multilevel degenerative disc disease and facet arthropathy. No findings of central spinal canal or significant foraminal stenosis. See above report for full details. Electronically signed by:  Judson Lepe MD  11/14/2019 6:34 PM CST Workstation: [id number]    Ct Injection/aspriation Large Joint Or Bursa    Result Date: 11/14/2019  Narrative: PROCEDURE: CT-GUIDED HIP INJECTION  COMPARISON: None  HISTORY: pain, M25.551 Pain in right hip This exam was performed according to our departmental dose optimization program, which includes automated exposure control, adjustment of the mA and/or KV according to patient size and/or use of iterative reconstruction technique. TECHNIQUE/FINDINGS: Multichannel computed tomography of the right hip without contrast, with injection of bupivacaine, Kenalog, and iodinated contrast.  The risks and benefits of the procedure were discussed with the patient. Informed consent was obtained. The patient was placed supine on the CT examination table and the right hip was prepped and draped in  a sterile fashion. Approximately 5 cc of lidocaine were administered for local anesthesia. A 20-gauge spinal needle was used to access the acetabular joint under CT guidance. Once contrast was confirmed within the joint, a total of 3 mL Isovue-300, 80 mg Kenalog in 2 mL of solution, and 5 mL of Marcaine. Additional findings: There is colonic diverticulosis the visualized portions of the abdomen.     Impression: CONCLUSION: Successful right hip injection as described above. Electronically signed by:  Law House MD  11/14/2019 5:09 PM CST Workstation: [id number]    Reviewed this MRI scan.  She does have fairly significant degree of facet arthropathy this may be in fact her symptoms are coming from.        ASSESSMENT:    Diagnoses and all orders for this visit:    Right hip pain  -     Ambulatory Referral to Physical Therapy Evaluate and treat  -     Miscellaneous DME    Low back pain, unspecified back pain laterality, unspecified chronicity, unspecified whether sciatica present  -     Ambulatory Referral to Physical Therapy Evaluate and treat  -     Miscellaneous DME    Spinal stenosis of lumbar region, unspecified whether neurogenic claudication present  -     Ambulatory Referral to Physical Therapy Evaluate and treat  -     Miscellaneous DME    Primary osteoarthritis of right hip  -     Ambulatory Referral to Physical Therapy Evaluate and treat  -     Miscellaneous DME          PLAN she had problems with the facet joint.  We are going to try try physical therapy as well as a lumbosacral corset.  If not better may get a spine referral for her.  I do not believe her hips are causing her significant issues at this point.  I think most of this is her back.  Follow-up in a month    Patient's Body mass index is 33.52 kg/m². BMI is above normal parameters. Recommendations include: exercise counseling and nutrition counseling.      No Follow-up on file.      This document has been electronically signed by Cedric ALARCON  MD Kale on December 4, 2019 5:09 PM

## 2019-12-11 ENCOUNTER — HOSPITAL ENCOUNTER (OUTPATIENT)
Dept: PHYSICAL THERAPY | Facility: HOSPITAL | Age: 69
Setting detail: THERAPIES SERIES
Discharge: HOME | End: 2019-12-11

## 2019-12-11 DIAGNOSIS — M48.061 SPINAL STENOSIS OF LUMBAR REGION, UNSPECIFIED WHETHER NEUROGENIC CLAUDICATION PRESENT: Primary | ICD-10-CM

## 2019-12-11 DIAGNOSIS — M16.11 PRIMARY OSTEOARTHRITIS OF RIGHT HIP: ICD-10-CM

## 2019-12-11 DIAGNOSIS — M25.551 RIGHT HIP PAIN: ICD-10-CM

## 2019-12-11 DIAGNOSIS — M54.50 LOW BACK PAIN, UNSPECIFIED BACK PAIN LATERALITY, UNSPECIFIED CHRONICITY, UNSPECIFIED WHETHER SCIATICA PRESENT: ICD-10-CM

## 2019-12-11 PROCEDURE — 97162 PT EVAL MOD COMPLEX 30 MIN: CPT | Performed by: PHYSICAL THERAPIST

## 2019-12-11 PROCEDURE — 97110 THERAPEUTIC EXERCISES: CPT | Performed by: PHYSICAL THERAPIST

## 2019-12-11 NOTE — THERAPY EVALUATION
Outpatient Physical Therapy Ortho Initial Evaluation  [facility]     Patient Name: Marielle Hansen  : 1950  MRN: [id number]  Today's Date: 2019      Visit Date: 2019  Visit   Return to MD: KENNY  Re-cert date: N/A  Patient Active Problem List   Diagnosis   • Essential hypertension   • Hyperlipidemia   • Hypothyroidism   • Right hip pain   • Current moderate episode of major depressive disorder without prior episode (CMS/HCC)   • Vitamin D deficiency   • Vitamin B12 deficiency   • Spinal stenosis of lumbar region   • Low back pain   • Primary osteoarthritis of right hip        Past Medical History:   Diagnosis Date   • Arthritis    • Disease of thyroid gland    • Hypertension    • TIA (transient ischemic attack)         Past Surgical History:   Procedure Laterality Date   • APPENDECTOMY     • CHOLECYSTECTOMY     • HYSTERECTOMY     • TOTAL KNEE ARTHROPLASTY Bilateral        Visit Dx:     ICD-10-CM ICD-9-CM   1. Spinal stenosis of lumbar region, unspecified whether neurogenic claudication present M48.061 724.02   2. Low back pain, unspecified back pain laterality, unspecified chronicity, unspecified whether sciatica present M54.5 724.2   3. Primary osteoarthritis of right hip M16.11 715.15     Medications (Admitted on 2019)    Outpatient Medications    Cholecalciferol (VITAMIN D3) 1.25 MG (96315 UT) capsule     escitalopram (LEXAPRO) 10 MG tablet     levothyroxine (SYNTHROID, LEVOTHROID) 137 MCG tablet     lovastatin (MEVACOR) 40 MG tablet     metoprolol succinate XL (TOPROL-XL) 50 MG 24 hr tablet     olmesartan (BENICAR) 40 MG tablet     traZODone (DESYREL) 150 MG tablet    Clinic-Administered Medications    cyanocobalamin injection 1,000 mcg      Allergies: NKA        PT Ortho     Row Name 19 1500       Subjective Comments    Subjective Comments  70 yo female with chronic R hip pain for the past 2 years, diagnosed with osteoarthritis. R kidney region pain with h/o  documented LBP with lumbar spinal stenosis. Aggravating factors: sitting, sleeping  -BS       Precautions and Contraindications    Precautions/Limitations  no known precautions/limitations  -BS    Precautions  h/o B TKA  -BS       Subjective Pain    Able to rate subjective pain?  yes  -BS    Pre-Treatment Pain Level  3  -BS    Post-Treatment Pain Level  3  -BS    Subjective Pain Comment  R hip  -BS       Posture/Observations    Posture/Observations Comments  sits weight shifting off R hip, excessively wt bearing on L side of pelvis  -BS       Special Tests/Palpation    Special Tests/Palpation  Hip;Lumbar/SI  -BS       Lumbar/SI Special Tests    SLR (Neural Tension)  Bilateral:;Negative  -BS    SI Compression Test (SI Dysfunction)  Negative  -BS    SI Distraction Test (SI Dysfunction)  Negative  -BS    Lumbar/SI Special Tests Comments  hip quadrant test-+ R hip/groin pain  -BS       Hip Special Tests    JULIUS (hip vs SI pathology)  Right:;Positive  -BS    Hip scour test (labral vs hip pathology)  Right:;Positive  -BS    FAIR test (piriformis syndrome)  Bilateral:;Negative  -BS       General ROM    GENERAL ROM COMMENTS  AROM: R hip flex 105 deg L hip flex 110 deg   -BS       MMT (Manual Muscle Testing)    General MMT Comments  MMT: R LE 5/5 except 4+/5 quads; L LE-hip flex 5/5 knee flex/ext 5/5 ankle DF 5/5  -BS       Sensation    Sensation WNL?  WNL  -BS    Light Touch  No apparent deficits  -BS    Additional Comments  intact fine motor coordination B LE's  -BS       Flexibility    Flexibility Tested?  Lower Extremity  -BS       Lower Extremity Flexibility    Hamstrings  Bilateral:;WNL B WFL 80° w/ 90/90 SLR test position  -BS      User Key  (r) = Recorded By, (t) = Taken By, (c) = Cosigned By    Initials Name Provider Type    Maxi Mehta, PT Physical Therapist                      Therapy Education  Education Details: SKC S, fwd lunge S, standing HS S  Given: HEP  Program: New  How Provided: Verbal,  Demonstration, Written  Provided to: Patient  Level of Understanding: Verbalized, Demonstrated     PT OP Goals     Row Name 12/11/19 1500          PT Short Term Goals    STG Date to Achieve  12/27/19  -BS     STG 1  Pt independent with HEP  -BS     STG 1 Progress  New  -BS     STG 2  Improve R hip flex AROM to 110°  -BS     STG 2 Progress  New  -BS        Long Term Goals    LTG Date to Achieve  12/27/19  -BS     LTG 1  same as STG's  -BS        Time Calculation    PT Goal Re-Cert Due Date  -- N/A  -BS       User Key  (r) = Recorded By, (t) = Taken By, (c) = Cosigned By    Initials Name Provider Type    Maxi Mehta, PT Physical Therapist          PT Assessment/Plan     Row Name 12/11/19 1519          PT Assessment    Functional Limitations  Limitation in home management;Impaired gait;Performance in work activities  -BS     Impairments  Range of motion;Impaired flexibility;Pain  -BS     Assessment Comments  R hip pain due to osteoarthritis.  -BS     Please refer to paper survey for additional self-reported information  Yes  -BS     Rehab Potential  Good  -BS     Patient/caregiver participated in establishment of treatment plan and goals  Yes  -BS     Patient would benefit from skilled therapy intervention  Yes  -BS        PT Plan    PT Frequency  1x/week  -BS     Predicted Duration of Therapy Intervention (Therapy Eval)  2 sessions total (eval + 1 follow up)  -BS     Planned CPT's?  PT EVAL MOD COMPLELITY: 00983  -BS     Physical Therapy Interventions (Optional Details)  patient/family education;strengthening;stretching  -BS     PT Plan Comments  f/u with HEP review and d/c with 30 day free [employer] membership.   -BS       User Key  (r) = Recorded By, (t) = Taken By, (c) = Cosigned By    Initials Name Provider Type    Maxi Mehta, PT Physical Therapist            OP Exercises     Row Name 12/11/19 1500             Subjective Comments    Subjective Comments  68 yo female with chronic R hip pain for  "the past 2 years, diagnosed with osteoarthritis. R kidney region pain with h/o documented LBP with lumbar spinal stenosis. Aggravating factors: sitting, sleeping  -BS         Subjective Pain    Able to rate subjective pain?  yes  -BS      Pre-Treatment Pain Level  3  -BS      Post-Treatment Pain Level  3  -BS      Subjective Pain Comment  R hip  -BS         Exercise 1    Exercise Name 1  SKC S w/ towel  -BS      Reps 1  1x10 reps w/ self OP-NE  -BS      Time 1  1x30\" hold (sustained)-NE  -BS      Additional Comments  NE(no effect)  -BS         Exercise 2    Exercise Name 2  fwd lunge S  -BS      Sets 2  1  -BS      Reps 2  3  -BS      Time 2  30\" hold  -BS         Exercise 3    Exercise Name 3  standing HS S  -BS      Sets 3  1  -BS      Reps 3  3  -BS      Time 3  30\" hold  -BS        User Key  (r) = Recorded By, (t) = Taken By, (c) = Cosigned By    Initials Name Provider Type    Maxi Mehta, PT Physical Therapist                        Outcome Measure Options: Lower Extremity Functional Scale (LEFS)  Lower Extremity Functional Index  Any of your usual work, housework or school activities: A little bit of difficulty  Your usual hobbies, recreational or sporting activities: A little bit of difficulty  Getting into or out of the bath: A little bit of difficulty  Walking between rooms: No difficulty  Putting on your shoes or socks: No difficulty  Squatting: Quite a bit of difficulty  Lifting an object, like a bag of groceries from the floor: A little bit of difficulty  Performing light activities around your home: Moderate difficulty  Performing heavy activities around your home: Quite a bit of difficulty  Getting into or out of a car: Moderate difficulty  Walking 2 blocks: Quite a bit of difficulty  Walking a mile: Extreme difficulty or unable to perform activity  Going up or down 10 stairs (about 1 flight of stairs): Moderate difficulty  Standing for 1 hour: Quite a bit of difficulty  Sitting for 1 hour: " Moderate difficulty  Running on even ground: Extreme difficulty or unable to perform activity  Running on uneven ground: Extreme difficulty or unable to perform activity  Making sharp turns while running fast: Extreme difficulty or unable to perform activity  Hopping: Extreme difficulty or unable to perform activity  Rolling over in bed: Moderate difficulty  Total: 34      Time Calculation:     Start Time: 1519  Stop Time: 1600  Time Calculation (min): 41 min  Total Timed Code Minutes- PT: 41 minute(s)     Therapy Charges for Today     Code Description Service Date Service Provider Modifiers Qty    30464994951 HC PT EVAL MOD COMPLEXITY 2 12/11/2019 Maxi Mays, PT GP 1    [id number]  PT THER PROC EA 15 MIN 12/11/2019 Maxi Mays, PT GP 1          PT G-Codes  Outcome Measure Options: Lower Extremity Functional Scale (LEFS)  Total: 34         Maxi Mays, PT  12/11/2019

## 2019-12-18 ENCOUNTER — OFFICE VISIT (OUTPATIENT)
Dept: FAMILY MEDICINE CLINIC | Facility: CLINIC | Age: 69
End: 2019-12-18

## 2019-12-18 VITALS
WEIGHT: 214 LBS | HEIGHT: 67 IN | BODY MASS INDEX: 33.59 KG/M2 | DIASTOLIC BLOOD PRESSURE: 84 MMHG | SYSTOLIC BLOOD PRESSURE: 158 MMHG

## 2019-12-18 DIAGNOSIS — I10 ESSENTIAL HYPERTENSION: Primary | ICD-10-CM

## 2019-12-18 DIAGNOSIS — R10.13 EPIGASTRIC PAIN: ICD-10-CM

## 2019-12-18 DIAGNOSIS — K92.1 HEMATOCHEZIA: ICD-10-CM

## 2019-12-18 PROCEDURE — 99214 OFFICE O/P EST MOD 30 MIN: CPT | Performed by: FAMILY MEDICINE

## 2019-12-18 RX ORDER — METOPROLOL SUCCINATE 100 MG/1
100 TABLET, EXTENDED RELEASE ORAL DAILY
Qty: 30 TABLET | Refills: 5 | Status: SHIPPED | OUTPATIENT
Start: 2019-12-18 | End: 2020-04-22

## 2020-01-06 ENCOUNTER — APPOINTMENT (OUTPATIENT)
Dept: PHYSICAL THERAPY | Facility: HOSPITAL | Age: 70
End: 2020-01-06

## 2020-01-08 ENCOUNTER — CLINICAL SUPPORT (OUTPATIENT)
Dept: FAMILY MEDICINE CLINIC | Facility: CLINIC | Age: 70
End: 2020-01-08

## 2020-01-08 DIAGNOSIS — E53.8 VITAMIN B12 DEFICIENCY: ICD-10-CM

## 2020-01-08 PROCEDURE — 96372 THER/PROPH/DIAG INJ SC/IM: CPT | Performed by: FAMILY MEDICINE

## 2020-01-08 RX ADMIN — CYANOCOBALAMIN 1000 MCG: 1000 INJECTION, SOLUTION INTRAMUSCULAR; SUBCUTANEOUS at 10:36

## 2020-01-14 NOTE — PROGRESS NOTES
Subjective   Marielle Hansen is a 69 y.o. female who presents to the office today for follow-up on rectal bleeding, hypertension.  She is still having episodes of bright red blood per rectum associated with bowel movements.  She also gets epigastric pain when this occurs.  Is been many years since her last colonoscopy    Her systolic blood pressure has still been running high, as much is 180 or more at home.  Even with the addition of the new meds it has remained elevated.    History of Present Illness   Hypertension   This is a new problem. The current episode started in the past year. The problem has been waxing and waning since onset. Associated symptoms include anxiety, headaches and malaise/fatigue. Pertinent negatives include no blurred vision, chest pain, neck pain, orthopnea, palpitations, peripheral edema, PND, shortness of breath or sweats. There are no associated agents to hypertension. Risk factors for coronary artery disease include dyslipidemia and family history.  She had been on ACE inhibitor several years ago but it caused her to cough.    The following portions of the patient's history were reviewed and updated as appropriate: allergies, current medications, past family history, past medical history, past social history, past surgical history and problem list.    Review of Systems   Constitutional: Positive for appetite change. Negative for chills and fever.   Respiratory: Negative.    Cardiovascular: Negative.    Gastrointestinal: Positive for blood in stool and diarrhea. Negative for nausea and vomiting.   Genitourinary: Negative for hematuria and vaginal bleeding.   Musculoskeletal: Positive for arthralgias, back pain and gait problem.   Skin: Negative.    Allergic/Immunologic: Negative for immunocompromised state.   Neurological: Negative for dizziness, tremors, seizures, syncope, weakness, light-headedness and numbness.   Hematological: Negative.    Psychiatric/Behavioral: Positive for  "dysphoric mood and sleep disturbance. Negative for agitation and confusion. The patient is nervous/anxious.    All other systems reviewed and are negative.      Objective    Vitals:    12/18/19 1041   BP: 158/84   Weight: 97.1 kg (214 lb)   Height: 170.2 cm (67\")   PainSc: 0-No pain   Body mass index is 33.52 kg/m².    Physical Exam   Constitutional: She is oriented to person, place, and time. She appears well-developed and well-nourished.   Obese.   HENT:   Head: Normocephalic and atraumatic.   Nose: Nose normal.   Mouth/Throat: Oropharynx is clear and moist.   Eyes: Pupils are equal, round, and reactive to light. Conjunctivae and EOM are normal.   Neck: Normal range of motion. Neck supple. No JVD present. No tracheal deviation present. No thyromegaly present.   Cardiovascular: Normal rate, regular rhythm, normal heart sounds and intact distal pulses.   No murmur heard.  Pulmonary/Chest: Effort normal and breath sounds normal. She has no wheezes.   Abdominal: Soft. Bowel sounds are normal. She exhibits no distension. There is no tenderness.   Musculoskeletal: Normal range of motion. She exhibits no edema.   Tender over the right hip, right low back.   Lymphadenopathy:     She has no cervical adenopathy.   Neurological: She is alert and oriented to person, place, and time. Coordination normal.   Skin: Skin is warm and dry. No rash noted.   Psychiatric: She has a normal mood and affect.   Nursing note and vitals reviewed.      Assessment/Plan   Marielle was seen today for follow-up.    Diagnoses and all orders for this visit:    Essential hypertension    Hematochezia  -     Ambulatory Referral to Gastroenterology    Epigastric pain  -     Ambulatory Referral to Gastroenterology    BMI 33.0-33.9,adult    Other orders  -     metoprolol succinate XL (TOPROL-XL) 100 MG 24 hr tablet; Take 1 tablet by mouth Daily.    Will make referral to gastro due to epigastric pain and rectal bleeding    Increase metoprolol to 100 mg daily " 03-Jan-2020 19:42 and continue Benicar as well for hypertension and recheck with me in a month.  Check blood pressures at home goal of 130/80 is given.    Patient's Body mass index is 33.52 kg/m². BMI is above normal parameters. Recommendations include: nutrition counseling.        This document has been electronically signed by Yoselin Rubio MD on December 18, 2019 11:02 AM

## 2020-01-17 ENCOUNTER — OFFICE VISIT (OUTPATIENT)
Dept: FAMILY MEDICINE CLINIC | Facility: CLINIC | Age: 70
End: 2020-01-17

## 2020-01-17 VITALS
HEIGHT: 67 IN | WEIGHT: 214 LBS | BODY MASS INDEX: 33.59 KG/M2 | HEART RATE: 66 BPM | SYSTOLIC BLOOD PRESSURE: 164 MMHG | DIASTOLIC BLOOD PRESSURE: 82 MMHG | OXYGEN SATURATION: 97 %

## 2020-01-17 DIAGNOSIS — I10 ESSENTIAL HYPERTENSION: Primary | ICD-10-CM

## 2020-01-17 DIAGNOSIS — R06.02 SHORTNESS OF BREATH: ICD-10-CM

## 2020-01-17 DIAGNOSIS — E66.9 OBESITY (BMI 30.0-34.9): ICD-10-CM

## 2020-01-17 PROCEDURE — 99214 OFFICE O/P EST MOD 30 MIN: CPT | Performed by: FAMILY MEDICINE

## 2020-01-17 RX ORDER — ALBUTEROL SULFATE 90 UG/1
2 AEROSOL, METERED RESPIRATORY (INHALATION) EVERY 4 HOURS PRN
Qty: 18 G | Refills: 5 | Status: SHIPPED | OUTPATIENT
Start: 2020-01-17 | End: 2023-03-22 | Stop reason: SDUPTHER

## 2020-01-17 RX ORDER — OLMESARTAN MEDOXOMIL AND HYDROCHLOROTHIAZIDE 40/12.5 40; 12.5 MG/1; MG/1
1 TABLET ORAL DAILY
Qty: 30 TABLET | Refills: 5 | Status: SHIPPED | OUTPATIENT
Start: 2020-01-17 | End: 2020-04-28 | Stop reason: SDUPTHER

## 2020-01-17 NOTE — PROGRESS NOTES
Notify patient test results are ok, her chest x-ray was normal.  Try the inhaler and if it is not working will send her for pulmonary function test.

## 2020-01-17 NOTE — PROGRESS NOTES
Subjective   Marielle Hansen is a 69 y.o. female who presents to the office today for follow-up on hypertension.  Also having some shortness of breath.  For several months now she has had a cough.  Sometimes it is dry and other times productive of just a clear sputum.  She feels like she wheezes.  She has not smoked in 20 years and has no history of lung problems.  Also her blood pressure is still running high.    History of Present Illness   Hypertension   This is a new problem. The current episode started in the past year. The problem has been waxing and waning since onset. Associated symptoms include anxiety, headaches and malaise/fatigue. Pertinent negatives include no blurred vision, chest pain, neck pain, orthopnea, palpitations, peripheral edema, PND, shortness of breath or sweats. There are no associated agents to hypertension. Risk factors for coronary artery disease include dyslipidemia and family history.  She had been on ACE inhibitor several years ago but it caused her to cough.    The following portions of the patient's history were reviewed and updated as appropriate: allergies, current medications, past family history, past medical history, past social history, past surgical history and problem list.    Review of Systems   Constitutional: Negative for chills and fever.   Respiratory: Negative.    Cardiovascular: Negative.    Gastrointestinal: Negative for nausea and vomiting.   Genitourinary: Negative for hematuria and vaginal bleeding.   Musculoskeletal: Positive for arthralgias, back pain and gait problem.   Skin: Negative.    Allergic/Immunologic: Negative for immunocompromised state.   Neurological: Negative for dizziness, tremors, seizures, syncope, weakness, light-headedness and numbness.   Hematological: Negative.    Psychiatric/Behavioral: Positive for sleep disturbance. Negative for agitation and confusion. The patient is nervous/anxious.    All other systems reviewed and are  "negative.      Objective    Vitals:    01/17/20 1021   BP: 164/82   Pulse: 66   SpO2: 97%   Weight: 97.1 kg (214 lb)   Height: 170.2 cm (67\")   PainSc: 0-No pain   Body mass index is 33.52 kg/m².    Physical Exam   Constitutional: She is oriented to person, place, and time. She appears well-developed and well-nourished.   Obese.   HENT:   Head: Normocephalic and atraumatic.   Nose: Nose normal.   Mouth/Throat: Oropharynx is clear and moist.   Eyes: Pupils are equal, round, and reactive to light. Conjunctivae and EOM are normal.   Neck: Normal range of motion. Neck supple. No JVD present. No tracheal deviation present. No thyromegaly present.   Cardiovascular: Normal rate, regular rhythm, normal heart sounds and intact distal pulses.   No murmur heard.  Pulmonary/Chest: Effort normal and breath sounds normal. She has no wheezes.   Abdominal: Soft. Bowel sounds are normal. She exhibits no distension. There is no tenderness.   Musculoskeletal: Normal range of motion. She exhibits no edema.   Tender over the right hip, right low back.   Lymphadenopathy:     She has no cervical adenopathy.   Neurological: She is alert and oriented to person, place, and time. Coordination normal.   Skin: Skin is warm and dry. No rash noted.   Psychiatric: She has a normal mood and affect.   Nursing note and vitals reviewed.      Assessment/Plan   Marielle was seen today for follow-up.    Diagnoses and all orders for this visit:    Essential hypertension    Shortness of breath  -     XR Chest PA & Lateral    Obesity (BMI 30.0-34.9)    Other orders  -     albuterol sulfate  (90 Base) MCG/ACT inhaler; Inhale 2 puffs Every 4 (Four) Hours As Needed for Wheezing.  -     olmesartan-hydrochlorothiazide (BENICAR HCT) 40-12.5 MG per tablet; Take 1 tablet by mouth Daily.    Will switch to Benicar HCT and continue to have her follow blood pressures closely with goal given of 130/80.  Follow-up with me in 1 month or sooner for problems    X-ray " today is negative.  Gave an albuterol inhaler for her to try for the shortness of breath and if not improving will consider referral for pulmonary function testing.    Patient's Body mass index is 33.52 kg/m². BMI is above normal parameters. Recommendations include: nutrition counseling.        This document has been electronically signed by Yoselin Rubio MD on January 17, 2020 10:42 AM

## 2020-01-27 ENCOUNTER — OFFICE VISIT (OUTPATIENT)
Dept: GASTROENTEROLOGY | Facility: CLINIC | Age: 70
End: 2020-01-27

## 2020-01-27 VITALS — BODY MASS INDEX: 36.03 KG/M2 | HEIGHT: 67 IN | WEIGHT: 229.6 LBS

## 2020-01-27 DIAGNOSIS — R19.7 DIARRHEA, UNSPECIFIED TYPE: ICD-10-CM

## 2020-01-27 DIAGNOSIS — K62.5 RECTAL BLEEDING: ICD-10-CM

## 2020-01-27 DIAGNOSIS — R11.2 NAUSEA AND VOMITING, INTRACTABILITY OF VOMITING NOT SPECIFIED, UNSPECIFIED VOMITING TYPE: ICD-10-CM

## 2020-01-27 DIAGNOSIS — R10.13 EPIGASTRIC PAIN: Primary | ICD-10-CM

## 2020-01-27 PROCEDURE — 99204 OFFICE O/P NEW MOD 45 MIN: CPT | Performed by: INTERNAL MEDICINE

## 2020-01-27 RX ORDER — DEXTROSE AND SODIUM CHLORIDE 5; .45 G/100ML; G/100ML
30 INJECTION, SOLUTION INTRAVENOUS CONTINUOUS PRN
Status: CANCELLED | OUTPATIENT
Start: 2020-01-30

## 2020-01-27 RX ORDER — DICYCLOMINE HCL 20 MG
20 TABLET ORAL EVERY 6 HOURS
Qty: 90 TABLET | Refills: 5 | Status: SHIPPED | OUTPATIENT
Start: 2020-01-27 | End: 2020-02-26

## 2020-01-27 RX ORDER — OMEPRAZOLE 40 MG/1
40 CAPSULE, DELAYED RELEASE ORAL DAILY
Qty: 30 CAPSULE | Refills: 11 | Status: SHIPPED | OUTPATIENT
Start: 2020-01-27 | End: 2020-01-29 | Stop reason: SDUPTHER

## 2020-01-27 NOTE — PROGRESS NOTES
[facility]      Chief Complaint:   Chief Complaint   Patient presents with   • Abdominal Pain   • Diarrhea   • Rectal Bleeding       Subjective     HPI:   Ms. Hansen is a 69-year-old  female with past medical history of arthritis, thyroid disease, hypertension, TIA presenting for evaluation for epigastric pain with nausea, vomiting and diarrhea.  She has intermittent bouts of epigastric pain with nausea with occasional vomiting for past 3 years.  Symptoms are worse with food intake and she has bouts of rectal bleeding on intermittent basis in small amounts.  Has 4-5 soft stools a day.  Denied weight loss.  Denied nocturnal symptoms.  Gained 75 pounds weight in past few years which she attributes to the loss of her  and daughter with bereavement.  Denied NSAID usage.  Past Medical History:   Past Medical History:   Diagnosis Date   • Arthritis    • Disease of thyroid gland    • Hypertension    • TIA (transient ischemic attack)        Past Surgical History:  Past Surgical History:   Procedure Laterality Date   • APPENDECTOMY     • CHOLECYSTECTOMY     • HYSTERECTOMY     • TOTAL KNEE ARTHROPLASTY Bilateral        Family History:  History reviewed. No pertinent family history.    Social History:   reports that she has never smoked. She has never used smokeless tobacco. She reports that she does not drink alcohol or use drugs.    Medications:   Prior to Admission medications    Medication Sig Start Date End Date Taking? Authorizing Provider   albuterol sulfate  (90 Base) MCG/ACT inhaler Inhale 2 puffs Every 4 (Four) Hours As Needed for Wheezing. 1/17/20  Yes Yoselin Rubio MD   Cholecalciferol (VITAMIN D3) 1.25 MG (38561 UT) capsule Take 1 capsule by mouth 2 (Two) Times a Week. 10/24/19  Yes Yoselin Rubio MD   escitalopram (LEXAPRO) 10 MG tablet Take 1 tablet by mouth Daily. 10/21/19  Yes Yoselin Rubio MD   levothyroxine (SYNTHROID, LEVOTHROID) 137 MCG tablet  Take 137 mcg by mouth Daily.   Yes ProviderHilario MD   lovastatin (MEVACOR) 40 MG tablet Take 40 mg by mouth Every Night.   Yes Hilario Jenkins MD   metoprolol succinate XL (TOPROL-XL) 100 MG 24 hr tablet Take 1 tablet by mouth Daily. 12/18/19  Yes Yoselin Rubio MD   olmesartan-hydrochlorothiazide (BENICAR HCT) 40-12.5 MG per tablet Take 1 tablet by mouth Daily. 1/17/20  Yes Yoselin Rubio MD   traZODone (DESYREL) 150 MG tablet Take 1 tablet by mouth Every Night. 11/25/19  Yes Yoselin Rubio MD   dicyclomine (BENTYL) 20 MG tablet Take 1 tablet by mouth Every 6 (Six) Hours for 30 days. 1/27/20 2/26/20  Ashlyn King MD   omeprazole (PrilOSEC) 40 MG capsule Take 1 capsule by mouth Daily. 1/27/20   Ashlyn King MD   polyethylene glycol (GoLYTELY) 236 g solution Starting at noon on day prior to procedure, drink 8 ounces every 30 minutes until all gone or stools are clear. May add flavor packet. 1/27/20   Ashlyn King MD   olmesartan (BENICAR) 40 MG tablet Take 1 tablet by mouth Daily. 10/21/19 1/27/20  Yoselin Rubio MD       Allergies:  Patient has no known allergies.    ROS:    Review of Systems   Constitutional: Negative for chills, fatigue, fever and unexpected weight change.   HENT: Negative for congestion, ear discharge, hearing loss, nosebleeds and sore throat.    Eyes: Negative for pain, discharge and redness.   Respiratory: Negative for cough, chest tightness, shortness of breath and wheezing.    Cardiovascular: Negative for chest pain and palpitations.   Gastrointestinal: Positive for abdominal pain, blood in stool, diarrhea, nausea and vomiting. Negative for abdominal distention and constipation.   Endocrine: Negative for cold intolerance, polydipsia, polyphagia and polyuria.   Genitourinary: Negative for dysuria, flank pain, frequency, hematuria and urgency.   Musculoskeletal: Negative for arthralgias, back pain, joint swelling and myalgias.   Skin: Negative for  "color change, pallor and rash.   Neurological: Negative for tremors, seizures, syncope, weakness and headaches.   Hematological: Negative for adenopathy. Does not bruise/bleed easily.   Psychiatric/Behavioral: Negative for behavioral problems, confusion, dysphoric mood, hallucinations and suicidal ideas. The patient is not nervous/anxious.      Objective     Height 170.2 cm (67\"), weight 104 kg (229 lb 9.6 oz), not currently breastfeeding.    Physical Exam   Constitutional: She is oriented to person, place, and time. She appears well-developed and well-nourished.   HENT:   Head: Normocephalic and atraumatic.   Mouth/Throat: Oropharynx is clear and moist.   Eyes: Pupils are equal, round, and reactive to light. Conjunctivae and EOM are normal.   Neck: Normal range of motion. Neck supple. No thyromegaly present.   Cardiovascular: Normal rate, regular rhythm and normal heart sounds.   No murmur heard.  Pulmonary/Chest: Effort normal and breath sounds normal. She has no wheezes.   Abdominal: Soft. Bowel sounds are normal. She exhibits no distension and no mass. There is no tenderness. No hernia.   Genitourinary:   Genitourinary Comments: No lesions noted   Musculoskeletal: Normal range of motion. She exhibits no edema or tenderness.   Lymphadenopathy:     She has no cervical adenopathy.   Neurological: She is alert and oriented to person, place, and time. No cranial nerve deficit.   Skin: Skin is warm and dry. No rash noted.   Psychiatric: She has a normal mood and affect. Thought content normal.      Extremities: No edema, cyanosis or clubbing.    Assessment/Plan   Marielle was seen today for abdominal pain, diarrhea and rectal bleeding.    Diagnoses and all orders for this visit:    Epigastric pain  -     Case Request; Standing  -     Case Request    Nausea and vomiting, intractability of vomiting not specified, unspecified vomiting type  -     Case Request; Standing  -     Case Request    Diarrhea, unspecified type  -    "  Case Request; Standing  -     Case Request    Rectal bleeding  -     Case Request; Standing  -     Case Request    Other orders  -     omeprazole (PrilOSEC) 40 MG capsule; Take 1 capsule by mouth Daily.  -     dicyclomine (BENTYL) 20 MG tablet; Take 1 tablet by mouth Every 6 (Six) Hours for 30 days.  -     Follow Anesthesia Guidelines / Standing Orders; Future  -     Obtain Informed Consent; Future    1.  Epigastric pain with nausea and vomiting rule out peptic ulcer disease, gastritis and pancreaticobiliary pathology.  Add Prilosec 40 mg p.o. daily.  Schedule EGD for further evaluation.  2.  Abdominal pain with diarrhea and rectal bleeding rule out IBD and colorectal neoplasia.  Schedule colonoscopy.  Add Bentyl 20 mg p.o. 4 times daily and Anusol as needed.  3.  Obesity with recent weight gain, recommend exercise and diet control.    ESOPHAGOGASTRODUODENOSCOPY (N/A), COLONOSCOPY (N/A)     Diagnosis Plan   1. Epigastric pain  Case Request    dextrose 5 % and sodium chloride 0.45 % infusion    Case Request   2. Nausea and vomiting, intractability of vomiting not specified, unspecified vomiting type  Case Request    dextrose 5 % and sodium chloride 0.45 % infusion    Case Request   3. Diarrhea, unspecified type  Case Request    dextrose 5 % and sodium chloride 0.45 % infusion    Case Request   4. Rectal bleeding  Case Request    dextrose 5 % and sodium chloride 0.45 % infusion    Case Request       Anticipated Surgical Procedure:  Orders Placed This Encounter   Procedures   • Follow Anesthesia Guidelines / Standing Orders     Standing Status:   Future   • Obtain Informed Consent     Standing Status:   Future     Order Specific Question:   Informed Consent Given For     Answer:   egd and colonoscopy       The risks, benefits, and alternatives of this procedure have been discussed with the patient or the responsible party- the patient understands and agrees to proceed.            This document has been electronically  signed by Ashlyn King MD on January 27, 2020 3:59 PM

## 2020-01-27 NOTE — PATIENT INSTRUCTIONS

## 2020-01-29 RX ORDER — OMEPRAZOLE 40 MG/1
40 CAPSULE, DELAYED RELEASE ORAL DAILY
Qty: 30 CAPSULE | Refills: 11 | Status: SHIPPED | OUTPATIENT
Start: 2020-01-29 | End: 2020-06-19

## 2020-01-30 ENCOUNTER — HOSPITAL ENCOUNTER (OUTPATIENT)
Facility: HOSPITAL | Age: 70
Setting detail: HOSPITAL OUTPATIENT SURGERY
Discharge: HOME | End: 2020-01-30
Attending: INTERNAL MEDICINE | Admitting: INTERNAL MEDICINE

## 2020-01-30 ENCOUNTER — ANESTHESIA (OUTPATIENT)
Dept: GASTROENTEROLOGY | Facility: HOSPITAL | Age: 70
End: 2020-01-30

## 2020-01-30 ENCOUNTER — ANESTHESIA EVENT (OUTPATIENT)
Dept: GASTROENTEROLOGY | Facility: HOSPITAL | Age: 70
End: 2020-01-30

## 2020-01-30 VITALS
WEIGHT: 226 LBS | TEMPERATURE: 98.2 F | SYSTOLIC BLOOD PRESSURE: 168 MMHG | DIASTOLIC BLOOD PRESSURE: 78 MMHG | HEIGHT: 67 IN | HEART RATE: 77 BPM | RESPIRATION RATE: 18 BRPM | BODY MASS INDEX: 35.47 KG/M2 | OXYGEN SATURATION: 93 %

## 2020-01-30 DIAGNOSIS — R11.2 NAUSEA AND VOMITING, INTRACTABILITY OF VOMITING NOT SPECIFIED, UNSPECIFIED VOMITING TYPE: ICD-10-CM

## 2020-01-30 DIAGNOSIS — K62.5 RECTAL BLEEDING: ICD-10-CM

## 2020-01-30 DIAGNOSIS — R19.7 DIARRHEA, UNSPECIFIED TYPE: ICD-10-CM

## 2020-01-30 DIAGNOSIS — R10.13 EPIGASTRIC PAIN: ICD-10-CM

## 2020-01-30 PROCEDURE — 88305 TISSUE EXAM BY PATHOLOGIST: CPT | Performed by: PATHOLOGY

## 2020-01-30 PROCEDURE — 45380 COLONOSCOPY AND BIOPSY: CPT | Performed by: INTERNAL MEDICINE

## 2020-01-30 PROCEDURE — 25010000002 FENTANYL CITRATE (PF) 100 MCG/2ML SOLUTION: Performed by: NURSE ANESTHETIST, CERTIFIED REGISTERED

## 2020-01-30 PROCEDURE — 25010000002 PROPOFOL 10 MG/ML EMULSION: Performed by: NURSE ANESTHETIST, CERTIFIED REGISTERED

## 2020-01-30 PROCEDURE — 88342 IMHCHEM/IMCYTCHM 1ST ANTB: CPT | Performed by: INTERNAL MEDICINE

## 2020-01-30 PROCEDURE — 88305 TISSUE EXAM BY PATHOLOGIST: CPT | Performed by: INTERNAL MEDICINE

## 2020-01-30 PROCEDURE — 45385 COLONOSCOPY W/LESION REMOVAL: CPT | Performed by: INTERNAL MEDICINE

## 2020-01-30 PROCEDURE — 43239 EGD BIOPSY SINGLE/MULTIPLE: CPT | Performed by: INTERNAL MEDICINE

## 2020-01-30 PROCEDURE — 88342 IMHCHEM/IMCYTCHM 1ST ANTB: CPT | Performed by: PATHOLOGY

## 2020-01-30 RX ORDER — FENTANYL CITRATE 50 UG/ML
INJECTION, SOLUTION INTRAMUSCULAR; INTRAVENOUS AS NEEDED
Status: DISCONTINUED | OUTPATIENT
Start: 2020-01-30 | End: 2020-01-30 | Stop reason: SURG

## 2020-01-30 RX ORDER — PROPOFOL 10 MG/ML
VIAL (ML) INTRAVENOUS AS NEEDED
Status: DISCONTINUED | OUTPATIENT
Start: 2020-01-30 | End: 2020-01-30 | Stop reason: SURG

## 2020-01-30 RX ORDER — LIDOCAINE HYDROCHLORIDE 20 MG/ML
INJECTION, SOLUTION INTRAVENOUS AS NEEDED
Status: DISCONTINUED | OUTPATIENT
Start: 2020-01-30 | End: 2020-01-30 | Stop reason: SURG

## 2020-01-30 RX ORDER — DEXTROSE AND SODIUM CHLORIDE 5; .45 G/100ML; G/100ML
30 INJECTION, SOLUTION INTRAVENOUS CONTINUOUS PRN
Status: DISCONTINUED | OUTPATIENT
Start: 2020-01-30 | End: 2020-01-30 | Stop reason: HOSPADM

## 2020-01-30 RX ADMIN — PROPOFOL 20 MG: 10 INJECTION, EMULSION INTRAVENOUS at 15:10

## 2020-01-30 RX ADMIN — FENTANYL CITRATE 25 MCG: 50 INJECTION, SOLUTION INTRAMUSCULAR; INTRAVENOUS at 14:48

## 2020-01-30 RX ADMIN — PROPOFOL 30 MG: 10 INJECTION, EMULSION INTRAVENOUS at 15:31

## 2020-01-30 RX ADMIN — PROPOFOL 20 MG: 10 INJECTION, EMULSION INTRAVENOUS at 15:03

## 2020-01-30 RX ADMIN — PROPOFOL 30 MG: 10 INJECTION, EMULSION INTRAVENOUS at 15:21

## 2020-01-30 RX ADMIN — LIDOCAINE HYDROCHLORIDE 100 MG: 20 INJECTION, SOLUTION INTRAVENOUS at 14:48

## 2020-01-30 RX ADMIN — PROPOFOL 20 MG: 10 INJECTION, EMULSION INTRAVENOUS at 15:12

## 2020-01-30 RX ADMIN — PROPOFOL 20 MG: 10 INJECTION, EMULSION INTRAVENOUS at 14:52

## 2020-01-30 RX ADMIN — PROPOFOL 20 MG: 10 INJECTION, EMULSION INTRAVENOUS at 15:18

## 2020-01-30 RX ADMIN — PROPOFOL 20 MG: 10 INJECTION, EMULSION INTRAVENOUS at 14:58

## 2020-01-30 RX ADMIN — PROPOFOL 30 MG: 10 INJECTION, EMULSION INTRAVENOUS at 15:06

## 2020-01-30 RX ADMIN — PROPOFOL 60 MG: 10 INJECTION, EMULSION INTRAVENOUS at 14:48

## 2020-01-30 RX ADMIN — PROPOFOL 30 MG: 10 INJECTION, EMULSION INTRAVENOUS at 15:04

## 2020-01-30 RX ADMIN — PROPOFOL 50 MG: 10 INJECTION, EMULSION INTRAVENOUS at 15:29

## 2020-01-30 RX ADMIN — PROPOFOL 20 MG: 10 INJECTION, EMULSION INTRAVENOUS at 15:15

## 2020-01-30 RX ADMIN — PROPOFOL 30 MG: 10 INJECTION, EMULSION INTRAVENOUS at 15:33

## 2020-01-30 RX ADMIN — PROPOFOL 40 MG: 10 INJECTION, EMULSION INTRAVENOUS at 15:08

## 2020-01-30 RX ADMIN — DEXTROSE AND SODIUM CHLORIDE 30 ML/HR: 5; 450 INJECTION, SOLUTION INTRAVENOUS at 14:34

## 2020-01-30 RX ADMIN — PROPOFOL 20 MG: 10 INJECTION, EMULSION INTRAVENOUS at 14:56

## 2020-01-30 RX ADMIN — PROPOFOL 30 MG: 10 INJECTION, EMULSION INTRAVENOUS at 15:24

## 2020-01-30 RX ADMIN — PROPOFOL 20 MG: 10 INJECTION, EMULSION INTRAVENOUS at 15:27

## 2020-01-30 RX ADMIN — PROPOFOL 20 MG: 10 INJECTION, EMULSION INTRAVENOUS at 15:01

## 2020-01-30 RX ADMIN — PROPOFOL 20 MG: 10 INJECTION, EMULSION INTRAVENOUS at 14:59

## 2020-01-30 NOTE — ANESTHESIA PREPROCEDURE EVALUATION
Anesthesia Evaluation     Patient summary reviewed   NPO Solid Status: > 8 hours  NPO Liquid Status: > 6 hours           Airway   Mallampati: I  TM distance: >3 FB  Neck ROM: full  No difficulty expected  Dental    (+) edentulous    Pulmonary    (+) shortness of breath,   Cardiovascular     Patient on routine beta blocker and Beta blocker given within 24 hours of surgery    (+) hypertension, hyperlipidemia,       Neuro/Psych  (+) TIA, psychiatric history,     GI/Hepatic/Renal/Endo    (+)  GERD, GI bleeding , thyroid problem hypothyroidism    Musculoskeletal     (+) back pain,   Abdominal    Substance History      OB/GYN          Other   arthritis,                    Anesthesia Plan    ASA 3     MAC     intravenous induction     Anesthetic plan, all risks, benefits, and alternatives have been provided, discussed and informed consent has been obtained with: patient.    Plan discussed with CRNA.

## 2020-01-30 NOTE — ANESTHESIA POSTPROCEDURE EVALUATION
Patient: Marielle Hansen    Procedure Summary     Date:  01/30/20 Room / Location:  [facility] ENDOSCOPY 1 / [facility] ENDOSCOPY    Anesthesia Start:  1439 Anesthesia Stop:  1537    Procedures:       ESOPHAGOGASTRODUODENOSCOPY (N/A )      COLONOSCOPY (N/A ) Diagnosis:       Epigastric pain      Nausea and vomiting, intractability of vomiting not specified, unspecified vomiting type      Diarrhea, unspecified type      Rectal bleeding      (Epigastric pain [R10.13])      (Nausea and vomiting, intractability of vomiting not specified, unspecified vomiting type [R11.2])      (Diarrhea, unspecified type [R19.7])      (Rectal bleeding [K62.5])    Surgeon:  Ashlyn King MD Provider:  Kathie Yu CRNA    Anesthesia Type:  MAC ASA Status:  3          Anesthesia Type: MAC    Vitals  No vitals data found for the desired time range.          Post Anesthesia Care and Evaluation    Patient location during evaluation: bedside  Patient participation: complete - patient participated  Level of consciousness: awake and sleepy but conscious  Pain score: 0  Pain management: adequate  Airway patency: patent  Anesthetic complications: No anesthetic complications  PONV Status: none  Cardiovascular status: acceptable and hemodynamically stable  Respiratory status: acceptable  Hydration status: acceptable  Post Neuraxial Block status: Motor and sensory function returned to baseline

## 2020-02-03 LAB
LAB AP CASE REPORT: NORMAL
LAB AP SPECIAL STAINS: NORMAL
PATH REPORT.FINAL DX SPEC: NORMAL
PATH REPORT.GROSS SPEC: NORMAL

## 2020-02-06 ENCOUNTER — OFFICE VISIT (OUTPATIENT)
Dept: FAMILY MEDICINE CLINIC | Facility: CLINIC | Age: 70
End: 2020-02-06

## 2020-02-06 VITALS
SYSTOLIC BLOOD PRESSURE: 184 MMHG | BODY MASS INDEX: 36.85 KG/M2 | WEIGHT: 234.8 LBS | DIASTOLIC BLOOD PRESSURE: 94 MMHG | HEIGHT: 67 IN

## 2020-02-06 DIAGNOSIS — E66.01 CLASS 2 SEVERE OBESITY WITH SERIOUS COMORBIDITY AND BODY MASS INDEX (BMI) OF 36.0 TO 36.9 IN ADULT, UNSPECIFIED OBESITY TYPE (HCC): ICD-10-CM

## 2020-02-06 DIAGNOSIS — E03.9 HYPOTHYROIDISM, UNSPECIFIED TYPE: ICD-10-CM

## 2020-02-06 DIAGNOSIS — F32.1 CURRENT MODERATE EPISODE OF MAJOR DEPRESSIVE DISORDER WITHOUT PRIOR EPISODE (HCC): ICD-10-CM

## 2020-02-06 DIAGNOSIS — G47.00 INSOMNIA, UNSPECIFIED TYPE: ICD-10-CM

## 2020-02-06 DIAGNOSIS — I10 ESSENTIAL HYPERTENSION: Primary | ICD-10-CM

## 2020-02-06 LAB
ALBUMIN SERPL-MCNC: 4.1 G/DL (ref 3.5–5)
ALBUMIN/GLOB SERPL: 1.3 G/DL (ref 1.1–1.8)
ALP SERPL-CCNC: 66 U/L (ref 38–126)
ALT SERPL W P-5'-P-CCNC: 21 U/L
ANION GAP SERPL CALCULATED.3IONS-SCNC: 9 MMOL/L (ref 5–15)
AST SERPL-CCNC: 23 U/L (ref 14–36)
BILIRUB SERPL-MCNC: 0.2 MG/DL (ref 0.2–1.3)
BUN BLD-MCNC: 15 MG/DL (ref 7–23)
BUN/CREAT SERPL: 15.2 (ref 7–25)
CALCIUM SPEC-SCNC: 9.4 MG/DL (ref 8.4–10.2)
CHLORIDE SERPL-SCNC: 105 MMOL/L (ref 101–112)
CO2 SERPL-SCNC: 28 MMOL/L (ref 22–30)
CREAT BLD-MCNC: 0.99 MG/DL (ref 0.52–1.04)
GFR SERPL CREATININE-BSD FRML MDRD: 56 ML/MIN/1.73 (ref 45–104)
GLOBULIN UR ELPH-MCNC: 3.1 GM/DL (ref 2.3–3.5)
GLUCOSE BLD-MCNC: 131 MG/DL (ref 70–99)
POTASSIUM BLD-SCNC: 4.2 MMOL/L (ref 3.4–5)
PROT SERPL-MCNC: 7.2 G/DL (ref 6.3–8.6)
SODIUM BLD-SCNC: 142 MMOL/L (ref 137–145)

## 2020-02-06 PROCEDURE — 84439 ASSAY OF FREE THYROXINE: CPT | Performed by: FAMILY MEDICINE

## 2020-02-06 PROCEDURE — 80053 COMPREHEN METABOLIC PANEL: CPT | Performed by: FAMILY MEDICINE

## 2020-02-06 PROCEDURE — 99214 OFFICE O/P EST MOD 30 MIN: CPT | Performed by: FAMILY MEDICINE

## 2020-02-06 PROCEDURE — 36415 COLL VENOUS BLD VENIPUNCTURE: CPT | Performed by: FAMILY MEDICINE

## 2020-02-06 PROCEDURE — 84443 ASSAY THYROID STIM HORMONE: CPT | Performed by: FAMILY MEDICINE

## 2020-02-06 RX ORDER — AMLODIPINE BESYLATE 5 MG/1
5 TABLET ORAL DAILY
Qty: 30 TABLET | Refills: 5 | Status: SHIPPED | OUTPATIENT
Start: 2020-02-06 | End: 2020-03-09 | Stop reason: SDUPTHER

## 2020-02-06 RX ORDER — ESZOPICLONE 3 MG/1
3 TABLET, FILM COATED ORAL NIGHTLY
Qty: 30 TABLET | Refills: 2 | Status: SHIPPED | OUTPATIENT
Start: 2020-02-06 | End: 2020-05-13

## 2020-02-06 NOTE — PROGRESS NOTES
Subjective   Marielle Hansen is a 69 y.o. female who presents to the office today for follow-up on rectal bleeding, hypertension.  She has put on 20 pounds in the last month.  She says that she cannot think of anything different she is done with her eating or routine.  She does have hypothyroidism.  She is not sleeping well and has trouble both falling asleep and staying asleep for several months now and it is getting worse.  Blood pressure has been quite elevated in spite of taking her medication.    She had recent colonoscopy done and is not having any rectal bleeding now    History of Present Illness   Hypertension   This is a new problem. The current episode started in the past year. The problem has been waxing and waning since onset. Associated symptoms include anxiety, headaches and malaise/fatigue. Pertinent negatives include no blurred vision, chest pain, neck pain, orthopnea, palpitations, peripheral edema, PND, shortness of breath or sweats. There are no associated agents to hypertension. Risk factors for coronary artery disease include dyslipidemia and family history.  She had been on ACE inhibitor several years ago but it caused her to cough.    The following portions of the patient's history were reviewed and updated as appropriate: allergies, current medications, past family history, past medical history, past social history, past surgical history and problem list.    Review of Systems   Constitutional: Positive for appetite change. Negative for chills and fever.   Respiratory: Negative.    Cardiovascular: Negative.    Gastrointestinal: Positive for blood in stool and diarrhea. Negative for nausea and vomiting.   Genitourinary: Negative for hematuria and vaginal bleeding.   Musculoskeletal: Positive for arthralgias, back pain and gait problem.   Skin: Negative.    Allergic/Immunologic: Negative for immunocompromised state.   Neurological: Negative for dizziness, tremors, seizures, syncope,  "weakness, light-headedness and numbness.   Hematological: Negative.    Psychiatric/Behavioral: Positive for dysphoric mood and sleep disturbance. Negative for agitation and confusion. The patient is nervous/anxious.    All other systems reviewed and are negative.      Objective    Vitals:    02/06/20 0917   BP: (!) 184/94   Weight: 107 kg (234 lb 12.8 oz)   Height: 170.2 cm (67\")   PainSc:   6   PainLoc: Abdomen   Body mass index is 36.77 kg/m².    Physical Exam   Constitutional: She is oriented to person, place, and time. She appears well-developed and well-nourished.   Obese.   HENT:   Head: Normocephalic and atraumatic.   Nose: Nose normal.   Mouth/Throat: Oropharynx is clear and moist.   Eyes: Pupils are equal, round, and reactive to light. Conjunctivae and EOM are normal.   Neck: Normal range of motion. Neck supple. No JVD present. No tracheal deviation present. No thyromegaly present.   Cardiovascular: Normal rate, regular rhythm, normal heart sounds and intact distal pulses.   No murmur heard.  Pulmonary/Chest: Effort normal and breath sounds normal. She has no wheezes.   Abdominal: Soft. Bowel sounds are normal. She exhibits no distension. There is no tenderness.   Musculoskeletal: Normal range of motion. She exhibits no edema.   Tender over the right hip, right low back.   Lymphadenopathy:     She has no cervical adenopathy.   Neurological: She is alert and oriented to person, place, and time. Coordination normal.   Skin: Skin is warm and dry. No rash noted.   Psychiatric: She has a normal mood and affect.   Nursing note and vitals reviewed.      Assessment/Plan   Marielle was seen today for hypertension.    Diagnoses and all orders for this visit:    Essential hypertension  -     Comprehensive Metabolic Panel    Class 2 severe obesity with serious comorbidity and body mass index (BMI) of 36.0 to 36.9 in adult, unspecified obesity type (CMS/HCC)    Hypothyroidism, unspecified type  -     T4, Free  -     " TSH    Current moderate episode of major depressive disorder without prior episode (CMS/HCC)    Insomnia, unspecified type  -     eszopiclone (LUNESTA) 3 MG tablet; Take 1 tablet by mouth Every Night. Take immediately before bedtime    Other orders  -     amLODIPine (NORVASC) 5 MG tablet; Take 1 tablet by mouth Daily.    Will check labs as above due to the weight gain and hypertension and follow-up on thyroid disease    We will add amlodipine for hypertension but hopefully we can get some weight off and this will help lower the blood pressure and will continue to monitor closely    The patient has read and signed the [facility] Controlled Substance Contract.  I will continue to see patient for regular follow up appointments. Patient is well controlled on the medication.  MARY has been reviewed by me and is updated every 3 months. The patient is aware of the potential for addiction and dependence.     We will try Lunesta for insomnia.    Continue Lexapro for depression    Patient's Body mass index is 36.77 kg/m². BMI is above normal parameters. Recommendations include: nutrition counseling.        This document has been electronically signed by Yoselin Rubio MD on February 6, 2020 9:25 AM

## 2020-02-07 LAB
T4 FREE SERPL-MCNC: 1.19 NG/DL (ref 0.93–1.7)
TSH SERPL DL<=0.05 MIU/L-ACNC: 0.91 UIU/ML (ref 0.27–4.2)

## 2020-02-10 ENCOUNTER — OFFICE VISIT (OUTPATIENT)
Dept: GASTROENTEROLOGY | Facility: CLINIC | Age: 70
End: 2020-02-10

## 2020-02-10 VITALS
WEIGHT: 228 LBS | DIASTOLIC BLOOD PRESSURE: 74 MMHG | HEIGHT: 67 IN | SYSTOLIC BLOOD PRESSURE: 140 MMHG | BODY MASS INDEX: 35.79 KG/M2 | HEART RATE: 78 BPM

## 2020-02-10 DIAGNOSIS — R11.2 NAUSEA AND VOMITING, INTRACTABILITY OF VOMITING NOT SPECIFIED, UNSPECIFIED VOMITING TYPE: Primary | ICD-10-CM

## 2020-02-10 PROCEDURE — 99213 OFFICE O/P EST LOW 20 MIN: CPT | Performed by: INTERNAL MEDICINE

## 2020-02-10 RX ORDER — DIAPER,BRIEF,INFANT-TODD,DISP
EACH MISCELLANEOUS
COMMUNITY
Start: 2020-02-06 | End: 2020-06-19

## 2020-02-10 RX ORDER — NYSTATIN 100000 U/G
OINTMENT TOPICAL
COMMUNITY
Start: 2020-02-06 | End: 2020-06-19

## 2020-02-10 RX ORDER — L.ACIDOPHIL/L.PLANTAR/BIFIDO 7 15B CELL
1 CAPSULE ORAL 2 TIMES DAILY
Qty: 60 CAPSULE | Refills: 11 | Status: SHIPPED | OUTPATIENT
Start: 2020-02-10 | End: 2020-06-19

## 2020-02-10 RX ORDER — ALUMINUM CHLORIDE 20 %
1 SOLUTION, NON-ORAL TOPICAL AS NEEDED
COMMUNITY
Start: 2020-02-06 | End: 2023-03-29

## 2020-02-10 NOTE — PROGRESS NOTES
Chief Complaint   Patient presents with   • Colonoscopy Results     EGD & Colonoscopy Results       Subjective    Marielle Hansen is a 69 y.o. female.    History of Present Illness    She presented to GI clinic for follow-up visit today.  Has continued symptoms with nausea and bloating.  Bloating is worse with food intake.  Also has diarrhea.  Denied rectal bleeding or weight loss.  Also denied vomiting, GERD symptoms or dysphagia.  Taking Bentyl twice daily.  Colonoscopy was consistent with diverticulosis, tubular adenomatous colon polyps and hemorrhoids.  EGD was consistent with hiatal hernia, esophagitis and gastritis.  Path was unremarkable.     The following portions of the patient's history were reviewed and updated as appropriate:   Past Medical History:   Diagnosis Date   • Arthritis    • Disease of thyroid gland    • Hypertension    • TIA (transient ischemic attack)      Past Surgical History:   Procedure Laterality Date   • APPENDECTOMY     • CHOLECYSTECTOMY     • COLONOSCOPY N/A 1/30/2020    Procedure: COLONOSCOPY;  Surgeon: Ashlyn King MD;  Location: [facility] ENDOSCOPY;  Service: Gastroenterology   • ENDOSCOPY N/A 1/30/2020    Procedure: ESOPHAGOGASTRODUODENOSCOPY;  Surgeon: Ashlyn King MD;  Location: [facility] ENDOSCOPY;  Service: Gastroenterology   • HYSTERECTOMY     • TOTAL KNEE ARTHROPLASTY Bilateral      No family history on file.  OB History    None       Prior to Admission medications    Medication Sig Start Date End Date Taking? Authorizing Provider   amLODIPine (NORVASC) 5 MG tablet Take 1 tablet by mouth Daily. 2/6/20  Yes Yoselin Rubio MD   Cholecalciferol (VITAMIN D3) 1.25 MG (96950 UT) capsule Take 1 capsule by mouth 2 (Two) Times a Week. 10/24/19  Yes Yoselin Rubio MD   dicyclomine (BENTYL) 20 MG tablet Take 1 tablet by mouth Every 6 (Six) Hours for 30 days. 1/27/20 2/26/20 Yes Ashlyn King MD   escitalopram (LEXAPRO) 10 MG tablet Take 1 tablet by mouth Daily.  10/21/19  Yes Yoselin Rubio MD   eszopiclone (LUNESTA) 3 MG tablet Take 1 tablet by mouth Every Night. Take immediately before bedtime 2/6/20  Yes Yoselin Rubio MD   levothyroxine (SYNTHROID, LEVOTHROID) 137 MCG tablet Take 137 mcg by mouth Daily.   Yes Hilario Jenkins MD   lovastatin (MEVACOR) 40 MG tablet Take 40 mg by mouth Every Night.   Yes Hilario Jenkins MD   metoprolol succinate XL (TOPROL-XL) 100 MG 24 hr tablet Take 1 tablet by mouth Daily. 12/18/19  Yes Yoselin Rubio MD   olmesartan-hydrochlorothiazide (BENICAR HCT) 40-12.5 MG per tablet Take 1 tablet by mouth Daily. 1/17/20  Yes Yoselin Rubio MD   omeprazole (PrilOSEC) 40 MG capsule Take 1 capsule by mouth Daily. 1/29/20  Yes Ashlyn King MD   albuterol sulfate  (90 Base) MCG/ACT inhaler Inhale 2 puffs Every 4 (Four) Hours As Needed for Wheezing. 1/17/20   Yoselin Rubio MD   DRYSOL 20 % external solution  2/6/20   Hilario Jenkins MD   hydrocortisone 1 % ointment  2/6/20   Hilario Jenkins MD   nystatin (MYCOSTATIN) 073051 UNIT/GM ointment  2/6/20   Hilario Jenkins MD   Probiotic Product (UP4 PROBIOTICS ADULT) capsule Take 1 capsule by mouth 2 (Two) Times a Day. 2/10/20   Ashlyn King MD   polyethylene glycol (GoLYTELY) 236 g solution Starting at noon on day prior to procedure, drink 8 ounces every 30 minutes until all gone or stools are clear. May add flavor packet. 1/27/20 2/10/20  Ashlyn King MD     No Known Allergies  Social History     Socioeconomic History   • Marital status:      Spouse name: Not on file   • Number of children: Not on file   • Years of education: Not on file   • Highest education level: Not on file   Tobacco Use   • Smoking status: Never Smoker   • Smokeless tobacco: Never Used   Substance and Sexual Activity   • Alcohol use: No     Frequency: Never   • Drug use: Never   • Sexual activity: Defer       Review of Systems  Review of Systems  "  Constitutional: Negative for chills, fatigue, fever and unexpected weight change.   HENT: Negative for congestion, ear discharge, hearing loss, nosebleeds and sore throat.    Eyes: Negative for pain, discharge and redness.   Respiratory: Negative for cough, chest tightness, shortness of breath and wheezing.    Cardiovascular: Negative for chest pain and palpitations.   Gastrointestinal: Positive for diarrhea and nausea. Negative for abdominal distention, abdominal pain, blood in stool, constipation and vomiting.   Endocrine: Negative for cold intolerance, polydipsia, polyphagia and polyuria.   Genitourinary: Negative for dysuria, flank pain, frequency, hematuria and urgency.   Musculoskeletal: Negative for arthralgias, back pain, joint swelling and myalgias.   Skin: Negative for color change, pallor and rash.   Neurological: Negative for tremors, seizures, syncope, weakness and headaches.   Hematological: Negative for adenopathy. Does not bruise/bleed easily.   Psychiatric/Behavioral: Negative for behavioral problems, confusion, dysphoric mood, hallucinations and suicidal ideas. The patient is not nervous/anxious.    Has bloating.   /74   Pulse 78   Ht 170.2 cm (67\")   Wt 103 kg (228 lb)   BMI 35.71 kg/m²     Objective    Physical Exam   Constitutional: She is oriented to person, place, and time. She appears well-developed and well-nourished.   HENT:   Head: Normocephalic and atraumatic.   Mouth/Throat: Oropharynx is clear and moist.   Eyes: Pupils are equal, round, and reactive to light. Conjunctivae and EOM are normal.   Neck: Normal range of motion. Neck supple. No thyromegaly present.   Cardiovascular: Normal rate, regular rhythm and normal heart sounds.   No murmur heard.  Pulmonary/Chest: Effort normal and breath sounds normal. She has no wheezes.   Abdominal: Soft. Bowel sounds are normal. She exhibits no distension and no mass. There is no tenderness. No hernia.   Genitourinary:   Genitourinary " Comments: No lesions noted   Musculoskeletal: Normal range of motion. She exhibits no edema or tenderness.   Lymphadenopathy:     She has no cervical adenopathy.   Neurological: She is alert and oriented to person, place, and time. No cranial nerve deficit.   Skin: Skin is warm and dry. No rash noted.   Psychiatric: She has a normal mood and affect. Thought content normal.     Office Visit on 02/06/2020   Component Date Value Ref Range Status   • Glucose 02/06/2020 131* 70 - 99 mg/dL Final   • BUN 02/06/2020 15  7 - 23 mg/dL Final   • Creatinine 02/06/2020 0.99  0.52 - 1.04 mg/dL Final   • Sodium 02/06/2020 142  137 - 145 mmol/L Final   • Potassium 02/06/2020 4.2  3.4 - 5.0 mmol/L Final   • Chloride 02/06/2020 105  101 - 112 mmol/L Final   • CO2 02/06/2020 28.0  22.0 - 30.0 mmol/L Final   • Calcium 02/06/2020 9.4  8.4 - 10.2 mg/dL Final   • Total Protein 02/06/2020 7.2  6.3 - 8.6 g/dL Final   • Albumin 02/06/2020 4.10  3.50 - 5.00 g/dL Final   • ALT (SGPT) 02/06/2020 21  <=35 U/L Final   • AST (SGOT) 02/06/2020 23  14 - 36 U/L Final   • Alkaline Phosphatase 02/06/2020 66  38 - 126 U/L Final   • Total Bilirubin 02/06/2020 0.2  0.2 - 1.3 mg/dL Final   • eGFR Non African Amer 02/06/2020 56  45 - 104 mL/min/1.73 Final   • Globulin 02/06/2020 3.1  2.3 - 3.5 gm/dL Final   • A/G Ratio 02/06/2020 1.3  1.1 - 1.8 g/dL Final   • BUN/Creatinine Ratio 02/06/2020 15.2  7.0 - 25.0 Final   • Anion Gap 02/06/2020 9.0  5.0 - 15.0 mmol/L Final   • Free T4 02/06/2020 1.19  0.93 - 1.70 ng/dL Final   • TSH 02/06/2020 0.908  0.270 - 4.200 uIU/mL Final     Assessment/Plan      1. Nausea and vomiting, intractability of vomiting not specified, unspecified vomiting type    1.  Nausea and bloating rule out gastroparesis.  Could also be due to gastritis.  Continue PPI.  Add probiotics.  Obtain gastric emptying scan.  2.  Epigastric pain, improved.  3.  Diarrhea, likely due to irritable bowel syndrome.  Increase Bentyl to 4 times daily.  Add  high-fiber diet.  4.  Colon polyps, repeat colonoscopy in 2 years.  5.  Obesity, recommend exercise and diet control.      Orders placed during this encounter include:  Orders Placed This Encounter   Procedures   • NM Gastric Emptying     Standing Status:   Future     Standing Expiration Date:   2/10/2021     Order Specific Question:   Reason for Exam:     Answer:   nausea       * Surgery not found *    Review and/or summary of lab tests, radiology, procedures, medications. Review and summary of old records and obtaining of history. The risks and benefits of my recommendations, as well as other treatment options were discussed with the patient today. Questions were answered.    New Medications Ordered This Visit   Medications   • Probiotic Product (UP4 PROBIOTICS ADULT) capsule     Sig: Take 1 capsule by mouth 2 (Two) Times a Day.     Dispense:  60 capsule     Refill:  11       Follow-up: No follow-ups on file.               Results for orders placed or performed in visit on 02/06/20   TSH   Result Value Ref Range    TSH 0.908 0.270 - 4.200 uIU/mL   T4, Free   Result Value Ref Range    Free T4 1.19 0.93 - 1.70 ng/dL   Comprehensive Metabolic Panel   Result Value Ref Range    Glucose 131 (H) 70 - 99 mg/dL    BUN 15 7 - 23 mg/dL    Creatinine 0.99 0.52 - 1.04 mg/dL    Sodium 142 137 - 145 mmol/L    Potassium 4.2 3.4 - 5.0 mmol/L    Chloride 105 101 - 112 mmol/L    CO2 28.0 22.0 - 30.0 mmol/L    Calcium 9.4 8.4 - 10.2 mg/dL    Total Protein 7.2 6.3 - 8.6 g/dL    Albumin 4.10 3.50 - 5.00 g/dL    ALT (SGPT) 21 <=35 U/L    AST (SGOT) 23 14 - 36 U/L    Alkaline Phosphatase 66 38 - 126 U/L    Total Bilirubin 0.2 0.2 - 1.3 mg/dL    eGFR Non  Amer 56 45 - 104 mL/min/1.73    Globulin 3.1 2.3 - 3.5 gm/dL    A/G Ratio 1.3 1.1 - 1.8 g/dL    BUN/Creatinine Ratio 15.2 7.0 - 25.0    Anion Gap 9.0 5.0 - 15.0 mmol/L   Results for orders placed or performed during the hospital encounter of 01/30/20   Tissue Pathology Exam    Result Value Ref Range    Case Report       Surgical Pathology Report                         Case: [id number]                                  Authorizing Provider:  Ashlyn King MD      Collected:           01/30/2020 02:58 PM          Ordering Location:     [facility]             Received:            01/31/2020 08:13 AM                                 [facility]                                                     Pathologist:           Ronnell Lopez MD                                                         Specimens:   1) - Small Intestine, Duodenum                                                                      2) - Gastric, Antrum                                                                                3) - Esophagus, eg junction                                                                         4) - Large Intestine, Cecum, polyp                                                                  5) - Large Intestine, Right / Ascending Colon, polyp                                                 6) - Large Intestine, colonic mucosa                                                                7) - Large Intestine, colonic mucosa polyps                                                Final Diagnosis       1.  MUCOSA, DUODENUM:  NO SIGNIFICANT HISTOLOGIC ABNORMALITY.    2.  MUCOSA, ANTRUM OF STOMACH:  CHRONIC GASTRITIS.  NEGATIVE FOR HELICOBACTER PYLORI (HP IMMUNOSTAIN).    3.  MUCOSA, ESOPHAGOGASTRIC JUNCTION:  REACTIVE CHANGE OF CARDIAC GASTRIC MUCOSA AND SQUAMOUS MUCOSA.  NEGATIVE FOR DYSPLASIA AND GOBLET CELL METAPLASIA.    4.  POLYP, CECUM:  TUBULAR ADENOMA.    5.  POLYP, ASCENDING COLON:  TUBULAR ADENOMA.    6.  MUCOSA, COLON:  NO SIGNIFICANT HISTOLOGIC ABNORMALITY.    7.  POLYPS, MUCOSA OF COLON:  TUBULAR ADENOMA.      Gross Description       Received for examination are 7 containers, 6 of which (1, 2, 3, 4, 5, 6) have nodular bits of white soft tissue measuring  0.3-0.5 cc in aggregate.  The seventh container (7) has nodular bits of white soft tissue measuring 1.3 cc in aggregate.  All specimens are embedded as labeled.  1A duodenum; 2A antrum of stomach; 3A esophagogastric junction; 4A polyp, cecum; 5A polyp, ascending colon; 6A mucosa of colon; 7A polyps, mucosa of colon.        Special Stains       Helicobacter pylori (HP) immunostain is performed (Block 2) because an appropriate inflammatory milieu is present and organisms are not seen on H & E stained slides.    HP immunostain was developed and its performance characteristics determined by [facility].  It has not been cleared or approved by the U.S. Food and Drug Administration.  The FDA has determined that such clearance or approval is not necessary.  This test is used for clinical purposes.  It should not be regarded as investigational or for research.  This laboratory is certified under the Clinical Laboratory Improvement Amendments of 1988 (CLIA-88) as qualified to perform high complexity clinical laboratory testing.    All controls show appropriate reactivity.         Results for orders placed or performed in visit on 12/04/19   CBC Auto Differential   Result Value Ref Range    WBC 7.86 3.40 - 10.80 10*3/mm3    RBC 4.75 3.77 - 5.28 10*6/mm3    Hemoglobin 13.5 12.0 - 15.9 g/dL    Hematocrit 42.8 34.0 - 46.6 %    MCV 90.1 79.0 - 97.0 fL    MCH 28.4 26.6 - 33.0 pg    MCHC 31.5 31.5 - 35.7 g/dL    RDW 14.9 12.3 - 15.4 %    RDW-SD 47.5 37.0 - 54.0 fl    MPV 9.0 6.0 - 12.0 fL    Platelets 223 140 - 450 10*3/mm3    Neutrophil % 63.1 42.7 - 76.0 %    Lymphocyte % 27.2 19.6 - 45.3 %    Monocyte % 6.9 5.0 - 12.0 %    Eosinophil % 2.4 0.3 - 6.2 %    Basophil % 0.4 0.0 - 1.5 %    Neutrophils, Absolute 4.96 1.70 - 7.00 10*3/mm3    Lymphocytes, Absolute 2.14 0.70 - 3.10 10*3/mm3    Monocytes, Absolute 0.54 0.10 - 0.90 10*3/mm3    Eosinophils, Absolute 0.19 0.00 - 0.40 10*3/mm3    Basophils,  Absolute 0.03 0.00 - 0.20 10*3/mm3   Results for orders placed or performed in visit on 12/04/19   Comprehensive Metabolic Panel   Result Value Ref Range    Glucose 103 (H) 70 - 99 mg/dL    BUN 13 7 - 23 mg/dL    Creatinine 1.01 0.52 - 1.04 mg/dL    Sodium 143 137 - 145 mmol/L    Potassium 4.6 3.4 - 5.0 mmol/L    Chloride 105 101 - 112 mmol/L    CO2 30.0 22.0 - 30.0 mmol/L    Calcium 9.5 8.4 - 10.2 mg/dL    Total Protein 7.7 6.3 - 8.6 g/dL    Albumin 4.30 3.50 - 5.00 g/dL    ALT (SGPT) 21 <=35 U/L    AST (SGOT) 22 14 - 36 U/L    Alkaline Phosphatase 55 38 - 126 U/L    Total Bilirubin 0.3 0.2 - 1.3 mg/dL    eGFR Non  Amer 54 45 - 104 mL/min/1.73    Globulin 3.4 2.3 - 3.5 gm/dL    A/G Ratio 1.3 1.1 - 1.8 g/dL    BUN/Creatinine Ratio 12.9 7.0 - 25.0    Anion Gap 8.0 5.0 - 15.0 mmol/L   Results for orders placed or performed in visit on 10/21/19   Urinalysis, Microscopic Only - Urine, Clean Catch   Result Value Ref Range    RBC, UA 0-2 (A) None Seen /HPF    WBC, UA 13-20 (A) None Seen /HPF    Bacteria, UA 1+ (A) None Seen /HPF    Squamous Epithelial Cells, UA 7-12 (A) None Seen, 0-2 /HPF    Hyaline Casts, UA None Seen None Seen /LPF    Mucus, UA Trace None Seen, Trace /HPF    Methodology Manual Light Microscopy    Urinalysis With Culture If Indicated - Urine, Clean Catch   Result Value Ref Range    Color, UA Yellow Yellow, Straw    Appearance, UA Cloudy (A) Clear    pH, UA 5.5 5.5 - 8.0    Specific Gravity, UA 1.020 1.005 - 1.030    Glucose, UA Negative Negative    Ketones, UA Negative Negative    Bilirubin, UA Negative Negative    Blood, UA Negative Negative    Protein, UA Negative Negative    Leuk Esterase, UA Small (1+) (A) Negative    Nitrite, UA Negative Negative    Urobilinogen, UA 0.2 E.U./dL 0.2 - 1.0 E.U./dL   CBC Auto Differential   Result Value Ref Range    WBC 8.89 3.40 - 10.80 10*3/mm3    RBC 4.83 3.77 - 5.28 10*6/mm3    Hemoglobin 13.6 12.0 - 15.9 g/dL    Hematocrit 42.8 34.0 - 46.6 %    MCV 88.6  79.0 - 97.0 fL    MCH 28.2 26.6 - 33.0 pg    MCHC 31.8 31.5 - 35.7 g/dL    RDW 14.5 12.3 - 15.4 %    RDW-SD 46.1 37.0 - 54.0 fl    MPV 9.5 6.0 - 12.0 fL    Platelets 248 140 - 450 10*3/mm3   Manual Differential   Result Value Ref Range    Neutrophil % 57.0 42.7 - 76.0 %    Lymphocyte % 33.0 19.6 - 45.3 %    Monocyte % 6.0 5.0 - 12.0 %    Eosinophil % 4.0 0.3 - 6.2 %    Neutrophils Absolute 5.07 1.70 - 7.00 10*3/mm3    Lymphocytes Absolute 2.93 0.70 - 3.10 10*3/mm3    Monocytes Absolute 0.53 0.10 - 0.90 10*3/mm3    Eosinophils Absolute 0.36 0.00 - 0.40 10*3/mm3    RBC Morphology Normal Normal    WBC Morphology Normal Normal    Platelet Estimate Adequate Normal     *Note: Due to a large number of results and/or encounters for the requested time period, some results have not been displayed. A complete set of results can be found in Results Review.         This document has been electronically signed by Ashlyn King MD on February 10, 2020 4:34 PM

## 2020-02-18 ENCOUNTER — HOSPITAL ENCOUNTER (OUTPATIENT)
Dept: NUCLEAR MEDICINE | Facility: HOSPITAL | Age: 70
Discharge: HOME | End: 2020-02-18

## 2020-02-18 DIAGNOSIS — R11.2 NAUSEA AND VOMITING, INTRACTABILITY OF VOMITING NOT SPECIFIED, UNSPECIFIED VOMITING TYPE: ICD-10-CM

## 2020-02-18 PROCEDURE — A9541 TC99M SULFUR COLLOID: HCPCS | Performed by: INTERNAL MEDICINE

## 2020-02-18 PROCEDURE — 78264 GASTRIC EMPTYING IMG STUDY: CPT

## 2020-02-18 PROCEDURE — 0 TECHNETIUM SULFUR COLLOID: Performed by: INTERNAL MEDICINE

## 2020-02-18 RX ADMIN — TECHNETIUM TC 99M SULFUR COLLOID 1 DOSE: KIT at 07:34

## 2020-02-26 ENCOUNTER — OFFICE VISIT (OUTPATIENT)
Dept: GASTROENTEROLOGY | Facility: CLINIC | Age: 70
End: 2020-02-26

## 2020-02-26 VITALS
HEIGHT: 67 IN | SYSTOLIC BLOOD PRESSURE: 142 MMHG | WEIGHT: 229 LBS | DIASTOLIC BLOOD PRESSURE: 68 MMHG | HEART RATE: 77 BPM | BODY MASS INDEX: 35.94 KG/M2

## 2020-02-26 DIAGNOSIS — E66.9 OBESITY (BMI 30.0-34.9): Primary | ICD-10-CM

## 2020-02-26 PROBLEM — R14.0 BLOATING: Status: ACTIVE | Noted: 2020-02-26

## 2020-02-26 PROCEDURE — 99213 OFFICE O/P EST LOW 20 MIN: CPT | Performed by: INTERNAL MEDICINE

## 2020-02-26 RX ORDER — SUCRALFATE 1 G/1
1 TABLET ORAL 4 TIMES DAILY
Qty: 120 TABLET | Refills: 5 | Status: SHIPPED | OUTPATIENT
Start: 2020-02-26 | End: 2020-06-19

## 2020-02-26 NOTE — PROGRESS NOTES
Chief Complaint   Patient presents with   • Nausea     gastric Empying 2/10/2020   • Vomiting       Subjective    Marielle Hansen is a 69 y.o. female.    History of Present Illness    Patient presented to GI clinic for follow-up visit today.  She feels better currently.  Shortness of breath has improved.  Bloating has improved some with frequent small meals and diet control.  Denies abdominal pain, nausea or vomiting.  Gained 3 pounds weight since last month.  Gastric emptying scan was consistent with rapid gastric emptying.  EGD was consistent with hiatal hernia, esophagitis and gastritis.  Path was unremarkable.     The following portions of the patient's history were reviewed and updated as appropriate:   Past Medical History:   Diagnosis Date   • Arthritis    • Disease of thyroid gland    • Hypertension    • TIA (transient ischemic attack)      Past Surgical History:   Procedure Laterality Date   • APPENDECTOMY     • CHOLECYSTECTOMY     • COLONOSCOPY N/A 1/30/2020    Procedure: COLONOSCOPY;  Surgeon: Ashlyn King MD;  Location: [facility] ENDOSCOPY;  Service: Gastroenterology   • ENDOSCOPY N/A 1/30/2020    Procedure: ESOPHAGOGASTRODUODENOSCOPY;  Surgeon: Ashlyn King MD;  Location: [facility] ENDOSCOPY;  Service: Gastroenterology   • HYSTERECTOMY     • TOTAL KNEE ARTHROPLASTY Bilateral      History reviewed. No pertinent family history.  OB History    None       Prior to Admission medications    Medication Sig Start Date End Date Taking? Authorizing Provider   albuterol sulfate  (90 Base) MCG/ACT inhaler Inhale 2 puffs Every 4 (Four) Hours As Needed for Wheezing. 1/17/20  Yes Yoselin Rubio MD   amLODIPine (NORVASC) 5 MG tablet Take 1 tablet by mouth Daily. 2/6/20  Yes Yoselin Rubio MD   Cholecalciferol (VITAMIN D3) 1.25 MG (56097 UT) capsule Take 1 capsule by mouth 2 (Two) Times a Week. 10/24/19  Yes Yoselin Rubio MD   dicyclomine (BENTYL) 20 MG tablet Take 1 tablet by mouth Every 6  (Six) Hours for 30 days. 1/27/20 2/26/20 Yes Ashlyn King MD   DRYSOL 20 % external solution  2/6/20  Yes Hilario Jenkins MD   escitalopram (LEXAPRO) 10 MG tablet Take 1 tablet by mouth Daily. 10/21/19  Yes Yoselin Rubio MD   eszopiclone (LUNESTA) 3 MG tablet Take 1 tablet by mouth Every Night. Take immediately before bedtime 2/6/20  Yes Yoselin Rubio MD   hydrocortisone 1 % ointment  2/6/20  Yes Hilario Jenkins MD   levothyroxine (SYNTHROID, LEVOTHROID) 137 MCG tablet Take 137 mcg by mouth Daily.   Yes Hilario Jenkins MD   lovastatin (MEVACOR) 40 MG tablet Take 40 mg by mouth Every Night.   Yes Hilario Jenkins MD   metoprolol succinate XL (TOPROL-XL) 100 MG 24 hr tablet Take 1 tablet by mouth Daily. 12/18/19  Yes Yoselin Rubio MD   nystatin (MYCOSTATIN) 923739 UNIT/GM ointment  2/6/20  Yes Hilario Jenkins MD   olmesartan-hydrochlorothiazide (BENICAR HCT) 40-12.5 MG per tablet Take 1 tablet by mouth Daily. 1/17/20  Yes Yoselin Rubio MD   omeprazole (PrilOSEC) 40 MG capsule Take 1 capsule by mouth Daily. 1/29/20  Yes Ashlyn King MD   Probiotic Product (UP4 PROBIOTICS ADULT) capsule Take 1 capsule by mouth 2 (Two) Times a Day. 2/10/20  Yes Ashlyn King MD   sucralfate (CARAFATE) 1 g tablet Take 1 tablet by mouth 4 (Four) Times a Day. 2/26/20   Ashlyn King MD     No Known Allergies  Social History     Socioeconomic History   • Marital status:      Spouse name: Not on file   • Number of children: Not on file   • Years of education: Not on file   • Highest education level: Not on file   Tobacco Use   • Smoking status: Never Smoker   • Smokeless tobacco: Never Used   Substance and Sexual Activity   • Alcohol use: No     Frequency: Never   • Drug use: Never   • Sexual activity: Defer       Review of Systems  Review of Systems   Constitutional: Positive for unexpected weight change. Negative for chills, fatigue and fever.   HENT: Negative for  "congestion, ear discharge, hearing loss, nosebleeds and sore throat.    Eyes: Negative for pain, discharge and redness.   Respiratory: Negative for cough, chest tightness, shortness of breath and wheezing.    Cardiovascular: Negative for chest pain and palpitations.   Gastrointestinal: Negative for abdominal distention, abdominal pain, blood in stool, constipation, diarrhea, nausea and vomiting.   Endocrine: Negative for cold intolerance, polydipsia, polyphagia and polyuria.   Genitourinary: Negative for dysuria, flank pain, frequency, hematuria and urgency.   Musculoskeletal: Negative for arthralgias, back pain, joint swelling and myalgias.   Skin: Negative for color change, pallor and rash.   Neurological: Negative for tremors, seizures, syncope, weakness and headaches.   Hematological: Negative for adenopathy. Does not bruise/bleed easily.   Psychiatric/Behavioral: Negative for behavioral problems, confusion, dysphoric mood, hallucinations and suicidal ideas. The patient is not nervous/anxious.    Has bloating.     /68 (BP Location: Right arm, Patient Position: Sitting)   Pulse 77   Ht 170.2 cm (67\")   Wt 104 kg (229 lb)   BMI 35.87 kg/m²     Objective    Physical Exam   Constitutional: She is oriented to person, place, and time. She appears well-developed and well-nourished.   HENT:   Head: Normocephalic and atraumatic.   Mouth/Throat: Oropharynx is clear and moist.   Eyes: Pupils are equal, round, and reactive to light. Conjunctivae and EOM are normal.   Neck: Normal range of motion. Neck supple. No thyromegaly present.   Cardiovascular: Normal rate, regular rhythm and normal heart sounds.   No murmur heard.  Pulmonary/Chest: Effort normal and breath sounds normal. She has no wheezes.   Abdominal: Soft. Bowel sounds are normal. She exhibits no distension and no mass. There is no tenderness. No hernia.   Genitourinary:   Genitourinary Comments: No lesions noted   Musculoskeletal: Normal range of " motion. She exhibits no edema or tenderness.   Lymphadenopathy:     She has no cervical adenopathy.   Neurological: She is alert and oriented to person, place, and time. No cranial nerve deficit.   Skin: Skin is warm and dry. No rash noted.   Psychiatric: She has a normal mood and affect. Thought content normal.     Office Visit on 02/06/2020   Component Date Value Ref Range Status   • Glucose 02/06/2020 131* 70 - 99 mg/dL Final   • BUN 02/06/2020 15  7 - 23 mg/dL Final   • Creatinine 02/06/2020 0.99  0.52 - 1.04 mg/dL Final   • Sodium 02/06/2020 142  137 - 145 mmol/L Final   • Potassium 02/06/2020 4.2  3.4 - 5.0 mmol/L Final   • Chloride 02/06/2020 105  101 - 112 mmol/L Final   • CO2 02/06/2020 28.0  22.0 - 30.0 mmol/L Final   • Calcium 02/06/2020 9.4  8.4 - 10.2 mg/dL Final   • Total Protein 02/06/2020 7.2  6.3 - 8.6 g/dL Final   • Albumin 02/06/2020 4.10  3.50 - 5.00 g/dL Final   • ALT (SGPT) 02/06/2020 21  <=35 U/L Final   • AST (SGOT) 02/06/2020 23  14 - 36 U/L Final   • Alkaline Phosphatase 02/06/2020 66  38 - 126 U/L Final   • Total Bilirubin 02/06/2020 0.2  0.2 - 1.3 mg/dL Final   • eGFR Non African Amer 02/06/2020 56  45 - 104 mL/min/1.73 Final   • Globulin 02/06/2020 3.1  2.3 - 3.5 gm/dL Final   • A/G Ratio 02/06/2020 1.3  1.1 - 1.8 g/dL Final   • BUN/Creatinine Ratio 02/06/2020 15.2  7.0 - 25.0 Final   • Anion Gap 02/06/2020 9.0  5.0 - 15.0 mmol/L Final   • Free T4 02/06/2020 1.19  0.93 - 1.70 ng/dL Final   • TSH 02/06/2020 0.908  0.270 - 4.200 uIU/mL Final     Assessment/Plan    No diagnosis found..   1.  Nausea and bloating, improved.  Continue frequent small meals.  Continue probiotics and PPI.  Add Carafate 1 g p.o. 4 times daily.  2.  Diarrhea, improved.  Continue Bentyl.  Continue high-fiber diet.  3.  Colon polyps, repeat colonoscopy in 2 years.  4.  Obesity with recent weight gain, recommend exercise and diet control.  Continue frequent small meals.    Orders placed during this encounter  include:  No orders of the defined types were placed in this encounter.      * Surgery not found *    Review and/or summary of lab tests, radiology, procedures, medications. Review and summary of old records and obtaining of history. The risks and benefits of my recommendations, as well as other treatment options were discussed with the patient today. Questions were answered.    New Medications Ordered This Visit   Medications   • sucralfate (CARAFATE) 1 g tablet     Sig: Take 1 tablet by mouth 4 (Four) Times a Day.     Dispense:  120 tablet     Refill:  5       Follow-up: Return in about 1 month (around 3/26/2020).               Results for orders placed or performed in visit on 02/06/20   TSH   Result Value Ref Range    TSH 0.908 0.270 - 4.200 uIU/mL   T4, Free   Result Value Ref Range    Free T4 1.19 0.93 - 1.70 ng/dL   Comprehensive Metabolic Panel   Result Value Ref Range    Glucose 131 (H) 70 - 99 mg/dL    BUN 15 7 - 23 mg/dL    Creatinine 0.99 0.52 - 1.04 mg/dL    Sodium 142 137 - 145 mmol/L    Potassium 4.2 3.4 - 5.0 mmol/L    Chloride 105 101 - 112 mmol/L    CO2 28.0 22.0 - 30.0 mmol/L    Calcium 9.4 8.4 - 10.2 mg/dL    Total Protein 7.2 6.3 - 8.6 g/dL    Albumin 4.10 3.50 - 5.00 g/dL    ALT (SGPT) 21 <=35 U/L    AST (SGOT) 23 14 - 36 U/L    Alkaline Phosphatase 66 38 - 126 U/L    Total Bilirubin 0.2 0.2 - 1.3 mg/dL    eGFR Non  Amer 56 45 - 104 mL/min/1.73    Globulin 3.1 2.3 - 3.5 gm/dL    A/G Ratio 1.3 1.1 - 1.8 g/dL    BUN/Creatinine Ratio 15.2 7.0 - 25.0    Anion Gap 9.0 5.0 - 15.0 mmol/L   Results for orders placed or performed during the hospital encounter of 01/30/20   Tissue Pathology Exam   Result Value Ref Range    Case Report       Surgical Pathology Report                         Case: [id number]                                  Authorizing Provider:  Ashlyn King MD      Collected:           01/30/2020 02:58 PM          Ordering Location:     [facility]             Received:             01/31/2020 08:13 AM                                 [facility]                                                     Pathologist:           Ronnell Lopez MD                                                         Specimens:   1) - Small Intestine, Duodenum                                                                      2) - Gastric, Antrum                                                                                3) - Esophagus, eg junction                                                                         4) - Large Intestine, Cecum, polyp                                                                  5) - Large Intestine, Right / Ascending Colon, polyp                                                 6) - Large Intestine, colonic mucosa                                                                7) - Large Intestine, colonic mucosa polyps                                                Final Diagnosis       1.  MUCOSA, DUODENUM:  NO SIGNIFICANT HISTOLOGIC ABNORMALITY.    2.  MUCOSA, ANTRUM OF STOMACH:  CHRONIC GASTRITIS.  NEGATIVE FOR HELICOBACTER PYLORI (HP IMMUNOSTAIN).    3.  MUCOSA, ESOPHAGOGASTRIC JUNCTION:  REACTIVE CHANGE OF CARDIAC GASTRIC MUCOSA AND SQUAMOUS MUCOSA.  NEGATIVE FOR DYSPLASIA AND GOBLET CELL METAPLASIA.    4.  POLYP, CECUM:  TUBULAR ADENOMA.    5.  POLYP, ASCENDING COLON:  TUBULAR ADENOMA.    6.  MUCOSA, COLON:  NO SIGNIFICANT HISTOLOGIC ABNORMALITY.    7.  POLYPS, MUCOSA OF COLON:  TUBULAR ADENOMA.      Gross Description       Received for examination are 7 containers, 6 of which (1, 2, 3, 4, 5, 6) have nodular bits of white soft tissue measuring 0.3-0.5 cc in aggregate.  The seventh container (7) has nodular bits of white soft tissue measuring 1.3 cc in aggregate.  All specimens are embedded as labeled.  1A duodenum; 2A antrum of stomach; 3A esophagogastric junction; 4A polyp, cecum; 5A polyp, ascending colon; 6A mucosa of colon; 7A polyps, mucosa  of colon.        Special Stains       Helicobacter pylori (HP) immunostain is performed (Block 2) because an appropriate inflammatory milieu is present and organisms are not seen on H & E stained slides.    HP immunostain was developed and its performance characteristics determined by [facility].  It has not been cleared or approved by the U.S. Food and Drug Administration.  The FDA has determined that such clearance or approval is not necessary.  This test is used for clinical purposes.  It should not be regarded as investigational or for research.  This laboratory is certified under the Clinical Laboratory Improvement Amendments of 1988 (CLIA-88) as qualified to perform high complexity clinical laboratory testing.    All controls show appropriate reactivity.         Results for orders placed or performed in visit on 12/04/19   CBC Auto Differential   Result Value Ref Range    WBC 7.86 3.40 - 10.80 10*3/mm3    RBC 4.75 3.77 - 5.28 10*6/mm3    Hemoglobin 13.5 12.0 - 15.9 g/dL    Hematocrit 42.8 34.0 - 46.6 %    MCV 90.1 79.0 - 97.0 fL    MCH 28.4 26.6 - 33.0 pg    MCHC 31.5 31.5 - 35.7 g/dL    RDW 14.9 12.3 - 15.4 %    RDW-SD 47.5 37.0 - 54.0 fl    MPV 9.0 6.0 - 12.0 fL    Platelets 223 140 - 450 10*3/mm3    Neutrophil % 63.1 42.7 - 76.0 %    Lymphocyte % 27.2 19.6 - 45.3 %    Monocyte % 6.9 5.0 - 12.0 %    Eosinophil % 2.4 0.3 - 6.2 %    Basophil % 0.4 0.0 - 1.5 %    Neutrophils, Absolute 4.96 1.70 - 7.00 10*3/mm3    Lymphocytes, Absolute 2.14 0.70 - 3.10 10*3/mm3    Monocytes, Absolute 0.54 0.10 - 0.90 10*3/mm3    Eosinophils, Absolute 0.19 0.00 - 0.40 10*3/mm3    Basophils, Absolute 0.03 0.00 - 0.20 10*3/mm3   Results for orders placed or performed in visit on 12/04/19   Comprehensive Metabolic Panel   Result Value Ref Range    Glucose 103 (H) 70 - 99 mg/dL    BUN 13 7 - 23 mg/dL    Creatinine 1.01 0.52 - 1.04 mg/dL    Sodium 143 137 - 145 mmol/L    Potassium 4.6 3.4 - 5.0  mmol/L    Chloride 105 101 - 112 mmol/L    CO2 30.0 22.0 - 30.0 mmol/L    Calcium 9.5 8.4 - 10.2 mg/dL    Total Protein 7.7 6.3 - 8.6 g/dL    Albumin 4.30 3.50 - 5.00 g/dL    ALT (SGPT) 21 <=35 U/L    AST (SGOT) 22 14 - 36 U/L    Alkaline Phosphatase 55 38 - 126 U/L    Total Bilirubin 0.3 0.2 - 1.3 mg/dL    eGFR Non  Amer 54 45 - 104 mL/min/1.73    Globulin 3.4 2.3 - 3.5 gm/dL    A/G Ratio 1.3 1.1 - 1.8 g/dL    BUN/Creatinine Ratio 12.9 7.0 - 25.0    Anion Gap 8.0 5.0 - 15.0 mmol/L   Results for orders placed or performed in visit on 10/21/19   Urinalysis, Microscopic Only - Urine, Clean Catch   Result Value Ref Range    RBC, UA 0-2 (A) None Seen /HPF    WBC, UA 13-20 (A) None Seen /HPF    Bacteria, UA 1+ (A) None Seen /HPF    Squamous Epithelial Cells, UA 7-12 (A) None Seen, 0-2 /HPF    Hyaline Casts, UA None Seen None Seen /LPF    Mucus, UA Trace None Seen, Trace /HPF    Methodology Manual Light Microscopy    Urinalysis With Culture If Indicated - Urine, Clean Catch   Result Value Ref Range    Color, UA Yellow Yellow, Straw    Appearance, UA Cloudy (A) Clear    pH, UA 5.5 5.5 - 8.0    Specific Gravity, UA 1.020 1.005 - 1.030    Glucose, UA Negative Negative    Ketones, UA Negative Negative    Bilirubin, UA Negative Negative    Blood, UA Negative Negative    Protein, UA Negative Negative    Leuk Esterase, UA Small (1+) (A) Negative    Nitrite, UA Negative Negative    Urobilinogen, UA 0.2 E.U./dL 0.2 - 1.0 E.U./dL   CBC Auto Differential   Result Value Ref Range    WBC 8.89 3.40 - 10.80 10*3/mm3    RBC 4.83 3.77 - 5.28 10*6/mm3    Hemoglobin 13.6 12.0 - 15.9 g/dL    Hematocrit 42.8 34.0 - 46.6 %    MCV 88.6 79.0 - 97.0 fL    MCH 28.2 26.6 - 33.0 pg    MCHC 31.8 31.5 - 35.7 g/dL    RDW 14.5 12.3 - 15.4 %    RDW-SD 46.1 37.0 - 54.0 fl    MPV 9.5 6.0 - 12.0 fL    Platelets 248 140 - 450 10*3/mm3   Manual Differential   Result Value Ref Range    Neutrophil % 57.0 42.7 - 76.0 %    Lymphocyte % 33.0 19.6 - 45.3 %     Monocyte % 6.0 5.0 - 12.0 %    Eosinophil % 4.0 0.3 - 6.2 %    Neutrophils Absolute 5.07 1.70 - 7.00 10*3/mm3    Lymphocytes Absolute 2.93 0.70 - 3.10 10*3/mm3    Monocytes Absolute 0.53 0.10 - 0.90 10*3/mm3    Eosinophils Absolute 0.36 0.00 - 0.40 10*3/mm3    RBC Morphology Normal Normal    WBC Morphology Normal Normal    Platelet Estimate Adequate Normal     *Note: Due to a large number of results and/or encounters for the requested time period, some results have not been displayed. A complete set of results can be found in Results Review.         This document has been electronically signed by Ashlyn King MD on February 26, 2020 3:39 PM

## 2020-02-26 NOTE — PATIENT INSTRUCTIONS

## 2020-03-03 RX ORDER — LEVOTHYROXINE SODIUM 137 UG/1
137 TABLET ORAL DAILY
Qty: 30 TABLET | Refills: 5 | Status: SHIPPED | OUTPATIENT
Start: 2020-03-03 | End: 2020-11-04 | Stop reason: SDUPTHER

## 2020-03-09 ENCOUNTER — LAB (OUTPATIENT)
Dept: LAB | Facility: OTHER | Age: 70
End: 2020-03-09

## 2020-03-09 ENCOUNTER — OFFICE VISIT (OUTPATIENT)
Dept: FAMILY MEDICINE CLINIC | Facility: CLINIC | Age: 70
End: 2020-03-09

## 2020-03-09 VITALS
HEIGHT: 67 IN | WEIGHT: 234 LBS | BODY MASS INDEX: 36.73 KG/M2 | DIASTOLIC BLOOD PRESSURE: 84 MMHG | SYSTOLIC BLOOD PRESSURE: 152 MMHG

## 2020-03-09 DIAGNOSIS — N39.0 URINARY TRACT INFECTION WITHOUT HEMATURIA, SITE UNSPECIFIED: ICD-10-CM

## 2020-03-09 DIAGNOSIS — R10.9 RIGHT FLANK PAIN: ICD-10-CM

## 2020-03-09 DIAGNOSIS — I10 ESSENTIAL HYPERTENSION: ICD-10-CM

## 2020-03-09 DIAGNOSIS — E53.8 VITAMIN B12 DEFICIENCY: ICD-10-CM

## 2020-03-09 DIAGNOSIS — F32.1 CURRENT MODERATE EPISODE OF MAJOR DEPRESSIVE DISORDER WITHOUT PRIOR EPISODE (HCC): Primary | ICD-10-CM

## 2020-03-09 DIAGNOSIS — G47.00 INSOMNIA, UNSPECIFIED TYPE: ICD-10-CM

## 2020-03-09 DIAGNOSIS — F41.9 ANXIETY: ICD-10-CM

## 2020-03-09 LAB
BACTERIA UR QL AUTO: ABNORMAL /HPF
BILIRUB UR QL STRIP: NEGATIVE
CLARITY UR: ABNORMAL
COLOR UR: YELLOW
GLUCOSE UR STRIP-MCNC: NEGATIVE MG/DL
HGB UR QL STRIP.AUTO: ABNORMAL
HYALINE CASTS UR QL AUTO: ABNORMAL /LPF
KETONES UR QL STRIP: NEGATIVE
LEUKOCYTE ESTERASE UR QL STRIP.AUTO: ABNORMAL
MUCOUS THREADS URNS QL MICRO: ABNORMAL /HPF
NITRITE UR QL STRIP: POSITIVE
PH UR STRIP.AUTO: 6 [PH] (ref 5.5–8)
PROT UR QL STRIP: NEGATIVE
RBC # UR: ABNORMAL /HPF
REF LAB TEST METHOD: ABNORMAL
SP GR UR STRIP: 1.02 (ref 1–1.03)
SQUAMOUS #/AREA URNS HPF: ABNORMAL /HPF
UROBILINOGEN UR QL STRIP: ABNORMAL
WBC UR QL AUTO: ABNORMAL /HPF

## 2020-03-09 PROCEDURE — 81001 URINALYSIS AUTO W/SCOPE: CPT | Performed by: FAMILY MEDICINE

## 2020-03-09 PROCEDURE — 96372 THER/PROPH/DIAG INJ SC/IM: CPT | Performed by: FAMILY MEDICINE

## 2020-03-09 PROCEDURE — 99214 OFFICE O/P EST MOD 30 MIN: CPT | Performed by: FAMILY MEDICINE

## 2020-03-09 PROCEDURE — 87086 URINE CULTURE/COLONY COUNT: CPT | Performed by: FAMILY MEDICINE

## 2020-03-09 RX ORDER — TRAZODONE HYDROCHLORIDE 150 MG/1
150 TABLET ORAL NIGHTLY
COMMUNITY
Start: 2020-02-26 | End: 2020-03-09 | Stop reason: ALTCHOICE

## 2020-03-09 RX ORDER — AMLODIPINE BESYLATE 5 MG/1
5 TABLET ORAL DAILY
Qty: 30 TABLET | Refills: 5 | Status: SHIPPED | OUTPATIENT
Start: 2020-03-09 | End: 2020-12-04

## 2020-03-09 RX ORDER — SULFAMETHOXAZOLE AND TRIMETHOPRIM 800; 160 MG/1; MG/1
1 TABLET ORAL 2 TIMES DAILY
Qty: 20 TABLET | Refills: 0 | OUTPATIENT
Start: 2020-03-09 | End: 2020-03-11

## 2020-03-09 RX ADMIN — CYANOCOBALAMIN 1000 MCG: 1000 INJECTION, SOLUTION INTRAMUSCULAR; SUBCUTANEOUS at 09:29

## 2020-03-09 NOTE — PROGRESS NOTES
Subjective  Delores Hansen is a 69 year old female presenting today for HTN follow-up, incontinence, and back pain.    HTN- She is taking Metoprolol, Benicar (since January), and Amlodipine (since February). Before medication, she had been having high BP of 180-200 systolic with headaches, but reports no headaches after beginning the medication.    Incontinence- She has been experiencing uncontrollable urination for 1 year that is worsening. It occurs when she bends over or rolls over in bed. She doesn't feel the urge to go, but urinates often throughout the day. She reports no burning or pain with urination, no dark urine or blood in urine.    Back pain- The pain is located in her right side, mid-low flank. It has been present for a year but is worsening lately. The pain will now wake her up at times and she has nausea/vomiting with it. It is worsened by walking or laying on that side but at times it also occurs when sitting still. Motrin, Tylenol have been tried but do not provide relief. The pain is tender to palpation she says but feels deep. She saw Dr. Henley in December, had a CT guided injection and an MRI which showed degenerative disk and facet arthopathy. She does have a history of kidney stones and says this pain feels like that at times.    She is trying to lose weight by walking to improve HTN. Her walking is limited by wheezing and by the worsening of her back pain. She has to sit and rest often to let the pain resolve. She has gained 2 lb since last visit 3 weeks ago.    She has been taking Lexapro for depression which she reports has helped a lot. She does not feel the need for counseling due to strong family communication and support.    She has been taking Lunesta for insomnia. She reports no improvement as she still has overwhelming anxious thoughts when she lies down to sleep about stressful events (moving) going on in her lie at this time. She cannot fall asleep due to  these.    Constitutional- no fatigue, no chills, +insomnia  HENT- no headaches, no rhinorrhea  Eyes- no vision changes, no pruritis  CV- no heart palpitations, no chest pain  Pulmonary- +wheezing with exertion, no coughing  Abdominal- +diarrhea, +nausea, +vomiting  - +increased frequency of urination, no blood in urine, no burning with urination  Musculoskeletal- + right side pain with walking, + right side pain with laying on that side  Neurologic- no dizziness, no vision changes, no weakness  Psych- +depression  Skin- no rashes    Objective  /84 Weight 106kg  Height 170.2cm BMI 36.6 Pain score 0  Constitutional- well appearing  HENT- no rhinorrhea  Eyes- no conjunctival injection, normal EOM, no scleral icterus, no discharge  CV- normal rate, regular rhythm  Pulmonary- normal lung sounds bilaterally, no rales, no rhonchi, no wheeze  Abdominal- no abdominal tenderness, no costovertebral angle tenderness  Musculoskeletal- tenderness to deep palpation at right mid-low lateral back  Neurologic- normal strength, normal gait  Psych- normal mood, normal affect, normal thought content  Skin- no rashes, no jaundice    MRI 11/06/2019  There is multilevel degenerative disc disease and facet  arthropathy. No findings of central spinal canal or significant  foraminal stenosis. See above report for full details.    Assessment/Plan    HTN- Her BP is much more controlled than before at 152/84 today. Her goal is 150/90 for >60 year old. Continue metoprolol, sartan/HCTZ, and amlodipine. Continue attempting weight loss through exercise.    Stress Incontinence- U/A to rule out kidney stone and UTI.    Back pain- Seems to be musculoskeletal pain due to resolution with rest and worsening by exertion. Per Dr. Henley it could be due to degenerative disk and facet arthopathy. Due to increase in pain lately, we will get an x ray to see any new changes.    Depression- Continue Lexapro. She feels no need for counseling due to  strong family support.    Insomnia- This will hopefully improve as the stressor in her life resolves. Continue Lunesta.

## 2020-03-09 NOTE — PROGRESS NOTES
Subjective   Marielle Hansen is a 69 y.o. female who presents to the office today for follow-up on rectal bleeding, hypertension.  She reports that her blood pressure is doing better although it is high today.    She does feel the Lexapro has helped with depression but still has some insomnia.  She is having a lot of stress right now and this may be keeping her from being able to go to sleep.  The Lunesta does help somewhat.    She is having some pain in the right flank that she thought was her kidney but it is worse when she touches it or moves or has been exerting herself.  She did not have a history of kidney stones but was concerned about this.  She notes some stress urinary incontinence from time to time.    She still is having difficulty losing weight and in fact is gained a few pounds since her last visit.    Needs to continue B12 shots.  History of Present Illness   Hypertension   This is a new problem. The current episode started in the past year. The problem has been waxing and waning since onset. Associated symptoms include anxiety, headaches and malaise/fatigue. Pertinent negatives include no blurred vision, chest pain, neck pain, orthopnea, palpitations, peripheral edema, PND, shortness of breath or sweats. There are no associated agents to hypertension. Risk factors for coronary artery disease include dyslipidemia and family history.  She had been on ACE inhibitor several years ago but it caused her to cough.  Obesity   This is a chronic problem. The current episode started more than 1 year ago. The problem occurs constantly. The problem has been gradually worsening. Associated symptoms include arthralgias and fatigue. Pertinent negatives include no abdominal pain, anorexia, change in bowel habit, chest pain, chills, congestion, coughing, diaphoresis, fever, headaches, joint swelling, myalgias, nausea, neck pain, numbness, rash, sore throat, swollen glands, urinary symptoms, vertigo, visual  "change, vomiting or weakness. The symptoms are aggravated by eating. Treatments tried: diet, exercise. The treatment provided mild relief.     The following portions of the patient's history were reviewed and updated as appropriate: allergies, current medications, past family history, past medical history, past social history, past surgical history and problem list.    Review of Systems   Constitutional: Positive for appetite change. Negative for chills and fever.   Respiratory: Negative.    Cardiovascular: Negative.    Gastrointestinal: Positive for blood in stool and diarrhea. Negative for nausea and vomiting.   Genitourinary: Negative for hematuria and vaginal bleeding.   Musculoskeletal: Positive for arthralgias, back pain and gait problem.   Skin: Negative.    Allergic/Immunologic: Negative for immunocompromised state.   Neurological: Negative for dizziness, tremors, seizures, syncope, weakness, light-headedness and numbness.   Hematological: Negative.    Psychiatric/Behavioral: Positive for dysphoric mood and sleep disturbance. Negative for agitation and confusion. The patient is nervous/anxious.    All other systems reviewed and are negative.      Objective    Vitals:    03/09/20 0919   BP: 152/84   Weight: 106 kg (234 lb)   Height: 170.2 cm (67\")   PainSc: 0-No pain   Body mass index is 36.65 kg/m².    Physical Exam   Constitutional: She is oriented to person, place, and time. She appears well-developed and well-nourished.   Obese.   HENT:   Head: Normocephalic and atraumatic.   Nose: Nose normal.   Mouth/Throat: Oropharynx is clear and moist.   Eyes: Pupils are equal, round, and reactive to light. Conjunctivae and EOM are normal.   Neck: Normal range of motion. Neck supple. No JVD present. No tracheal deviation present. No thyromegaly present.   Cardiovascular: Normal rate, regular rhythm, normal heart sounds and intact distal pulses.   No murmur heard.  Pulmonary/Chest: Effort normal and breath sounds " normal. She has no wheezes.   Mildly tender over the right lower rib cage, near the posterior axillary line   Abdominal: Soft. Bowel sounds are normal. She exhibits no distension. There is no tenderness.   Musculoskeletal: Normal range of motion. She exhibits no edema.   Tender over the right hip, right low back.   Lymphadenopathy:     She has no cervical adenopathy.   Neurological: She is alert and oriented to person, place, and time. Coordination normal.   Skin: Skin is warm and dry. No rash noted.   Psychiatric: She has a normal mood and affect.   Nursing note and vitals reviewed.      Assessment/Plan   Marielle was seen today for hypertension.    Diagnoses and all orders for this visit:    Current moderate episode of major depressive disorder without prior episode (CMS/HCC)    Vitamin B12 deficiency    Anxiety    Essential hypertension  -     amLODIPine (NORVASC) 5 MG tablet; Take 1 tablet by mouth Daily.    Right flank pain  -     XR Ribs Right With PA Chest; Future  -     Urinalysis With Culture If Indicated -; Future    Insomnia, unspecified type    Continue diet and exercise, lifestyle changes for weight issues.      Continue with current medications including amlodipine for hypertension.    Continue B12 shots    The patient has read and signed the [facility] Controlled Substance Contract.  I will continue to see patient for regular follow up appointments. Patient is well controlled on the medication.  MARY has been reviewed by me and is updated every 3 months. The patient is aware of the potential for addiction and dependence.     Continue Lunesta for insomnia.  I suspect that her sleep will improve when her stressors are less.    Continue Lexapro for depression    Suspect the flank pain is musculoskeletal.  But will get a urinalysis and an x-ray and follow-up accordingly.    Patient's Body mass index is 36.65 kg/m². BMI is above normal parameters. Recommendations include: nutrition  counseling.        This document has been electronically signed by Yoselin Rubio MD on March 9, 2020 10:04 AM

## 2020-03-09 NOTE — PROGRESS NOTES
Please call the patient regarding her abnormal result.  She does have a mild urinary tract infection.  Rib x-ray was negative.  Tell her I sent in antibiotics

## 2020-03-10 LAB — BACTERIA SPEC AEROBE CULT: NORMAL

## 2020-03-11 ENCOUNTER — HOSPITAL ENCOUNTER (EMERGENCY)
Facility: HOSPITAL | Age: 70
Discharge: HOME | End: 2020-03-11
Attending: EMERGENCY MEDICINE | Admitting: EMERGENCY MEDICINE

## 2020-03-11 ENCOUNTER — APPOINTMENT (OUTPATIENT)
Dept: CT IMAGING | Facility: HOSPITAL | Age: 70
End: 2020-03-11

## 2020-03-11 VITALS
BODY MASS INDEX: 37.06 KG/M2 | RESPIRATION RATE: 18 BRPM | WEIGHT: 236.1 LBS | DIASTOLIC BLOOD PRESSURE: 78 MMHG | OXYGEN SATURATION: 92 % | SYSTOLIC BLOOD PRESSURE: 181 MMHG | TEMPERATURE: 98 F | HEIGHT: 67 IN | HEART RATE: 72 BPM

## 2020-03-11 DIAGNOSIS — R10.9 RIGHT FLANK PAIN: Primary | ICD-10-CM

## 2020-03-11 DIAGNOSIS — N39.0 URINARY TRACT INFECTION WITH HEMATURIA, SITE UNSPECIFIED: ICD-10-CM

## 2020-03-11 DIAGNOSIS — R31.9 URINARY TRACT INFECTION WITH HEMATURIA, SITE UNSPECIFIED: ICD-10-CM

## 2020-03-11 LAB
ALBUMIN SERPL-MCNC: 4 G/DL (ref 3.5–5.2)
ALBUMIN/GLOB SERPL: 1.2 G/DL
ALP SERPL-CCNC: 53 U/L (ref 39–117)
ALT SERPL W P-5'-P-CCNC: 41 U/L (ref 1–33)
ANION GAP SERPL CALCULATED.3IONS-SCNC: 14 MMOL/L (ref 5–15)
AST SERPL-CCNC: 54 U/L (ref 1–32)
BACTERIA UR QL AUTO: ABNORMAL /HPF
BASOPHILS # BLD AUTO: 0.07 10*3/MM3 (ref 0–0.2)
BASOPHILS NFR BLD AUTO: 0.7 % (ref 0–1.5)
BILIRUB SERPL-MCNC: <0.2 MG/DL (ref 0.2–1.2)
BILIRUB UR QL STRIP: ABNORMAL
BUN BLD-MCNC: 25 MG/DL (ref 8–23)
BUN/CREAT SERPL: 17.1 (ref 7–25)
CALCIUM SPEC-SCNC: 9.5 MG/DL (ref 8.6–10.5)
CHLORIDE SERPL-SCNC: 102 MMOL/L (ref 98–107)
CLARITY UR: ABNORMAL
CO2 SERPL-SCNC: 25 MMOL/L (ref 22–29)
COLOR UR: ABNORMAL
CREAT BLD-MCNC: 1.46 MG/DL (ref 0.57–1)
DEPRECATED RDW RBC AUTO: 43.6 FL (ref 37–54)
EOSINOPHIL # BLD AUTO: 0.22 10*3/MM3 (ref 0–0.4)
EOSINOPHIL NFR BLD AUTO: 2.1 % (ref 0.3–6.2)
ERYTHROCYTE [DISTWIDTH] IN BLOOD BY AUTOMATED COUNT: 13.6 % (ref 12.3–15.4)
GFR SERPL CREATININE-BSD FRML MDRD: 36 ML/MIN/1.73
GLOBULIN UR ELPH-MCNC: 3.3 GM/DL
GLUCOSE BLD-MCNC: 114 MG/DL (ref 65–99)
GLUCOSE UR STRIP-MCNC: NEGATIVE MG/DL
HCT VFR BLD AUTO: 36.6 % (ref 34–46.6)
HGB BLD-MCNC: 12.3 G/DL (ref 12–15.9)
HGB UR QL STRIP.AUTO: ABNORMAL
HOLD SPECIMEN: NORMAL
HYALINE CASTS UR QL AUTO: ABNORMAL /LPF
IMM GRANULOCYTES # BLD AUTO: 0.13 10*3/MM3 (ref 0–0.05)
IMM GRANULOCYTES NFR BLD AUTO: 1.3 % (ref 0–0.5)
KETONES UR QL STRIP: ABNORMAL
LEUKOCYTE ESTERASE UR QL STRIP.AUTO: ABNORMAL
LIPASE SERPL-CCNC: 37 U/L (ref 13–60)
LYMPHOCYTES # BLD AUTO: 2.45 10*3/MM3 (ref 0.7–3.1)
LYMPHOCYTES NFR BLD AUTO: 23.6 % (ref 19.6–45.3)
MCH RBC QN AUTO: 29.6 PG (ref 26.6–33)
MCHC RBC AUTO-ENTMCNC: 33.6 G/DL (ref 31.5–35.7)
MCV RBC AUTO: 88.2 FL (ref 79–97)
MONOCYTES # BLD AUTO: 0.73 10*3/MM3 (ref 0.1–0.9)
MONOCYTES NFR BLD AUTO: 7 % (ref 5–12)
NEUTROPHILS # BLD AUTO: 6.76 10*3/MM3 (ref 1.7–7)
NEUTROPHILS NFR BLD AUTO: 65.3 % (ref 42.7–76)
NITRITE UR QL STRIP: NEGATIVE
NRBC BLD AUTO-RTO: 0 /100 WBC (ref 0–0.2)
PH UR STRIP.AUTO: 6 [PH] (ref 5–9)
PLATELET # BLD AUTO: 253 10*3/MM3 (ref 140–450)
PMV BLD AUTO: 9.6 FL (ref 6–12)
POTASSIUM BLD-SCNC: 3.9 MMOL/L (ref 3.5–5.2)
PROT SERPL-MCNC: 7.3 G/DL (ref 6–8.5)
PROT UR QL STRIP: ABNORMAL
RBC # BLD AUTO: 4.15 10*6/MM3 (ref 3.77–5.28)
RBC # UR: ABNORMAL /HPF
REF LAB TEST METHOD: ABNORMAL
SODIUM BLD-SCNC: 141 MMOL/L (ref 136–145)
SP GR UR STRIP: 1.03 (ref 1–1.03)
SQUAMOUS #/AREA URNS HPF: ABNORMAL /HPF
UROBILINOGEN UR QL STRIP: ABNORMAL
WBC NRBC COR # BLD: 10.36 10*3/MM3 (ref 3.4–10.8)
WBC UR QL AUTO: ABNORMAL /HPF
WHOLE BLOOD HOLD SPECIMEN: NORMAL

## 2020-03-11 PROCEDURE — 96375 TX/PRO/DX INJ NEW DRUG ADDON: CPT

## 2020-03-11 PROCEDURE — 25010000002 KETOROLAC TROMETHAMINE PER 15 MG: Performed by: PHYSICIAN ASSISTANT

## 2020-03-11 PROCEDURE — 87086 URINE CULTURE/COLONY COUNT: CPT | Performed by: PHYSICIAN ASSISTANT

## 2020-03-11 PROCEDURE — 25010000002 MORPHINE PER 10 MG: Performed by: EMERGENCY MEDICINE

## 2020-03-11 PROCEDURE — 85025 COMPLETE CBC W/AUTO DIFF WBC: CPT | Performed by: PHYSICIAN ASSISTANT

## 2020-03-11 PROCEDURE — 25010000002 ONDANSETRON PER 1 MG: Performed by: PHYSICIAN ASSISTANT

## 2020-03-11 PROCEDURE — 80053 COMPREHEN METABOLIC PANEL: CPT | Performed by: PHYSICIAN ASSISTANT

## 2020-03-11 PROCEDURE — 74176 CT ABD & PELVIS W/O CONTRAST: CPT

## 2020-03-11 PROCEDURE — 99284 EMERGENCY DEPT VISIT MOD MDM: CPT

## 2020-03-11 PROCEDURE — 81001 URINALYSIS AUTO W/SCOPE: CPT | Performed by: PHYSICIAN ASSISTANT

## 2020-03-11 PROCEDURE — 83690 ASSAY OF LIPASE: CPT | Performed by: PHYSICIAN ASSISTANT

## 2020-03-11 PROCEDURE — 96374 THER/PROPH/DIAG INJ IV PUSH: CPT

## 2020-03-11 RX ORDER — KETOROLAC TROMETHAMINE 15 MG/ML
15 INJECTION, SOLUTION INTRAMUSCULAR; INTRAVENOUS ONCE
Status: COMPLETED | OUTPATIENT
Start: 2020-03-11 | End: 2020-03-11

## 2020-03-11 RX ORDER — CEPHALEXIN 500 MG/1
500 CAPSULE ORAL 2 TIMES DAILY
Qty: 20 CAPSULE | Refills: 0 | Status: SHIPPED | OUTPATIENT
Start: 2020-03-11 | End: 2020-03-21

## 2020-03-11 RX ORDER — SODIUM CHLORIDE 0.9 % (FLUSH) 0.9 %
10 SYRINGE (ML) INJECTION AS NEEDED
Status: DISCONTINUED | OUTPATIENT
Start: 2020-03-11 | End: 2020-03-11 | Stop reason: HOSPADM

## 2020-03-11 RX ORDER — ONDANSETRON 2 MG/ML
4 INJECTION INTRAMUSCULAR; INTRAVENOUS ONCE
Status: COMPLETED | OUTPATIENT
Start: 2020-03-11 | End: 2020-03-11

## 2020-03-11 RX ORDER — CEPHALEXIN 500 MG/1
500 CAPSULE ORAL 2 TIMES DAILY
Qty: 14 CAPSULE | Refills: 0 | Status: SHIPPED | OUTPATIENT
Start: 2020-03-11 | End: 2020-03-11 | Stop reason: SDUPTHER

## 2020-03-11 RX ADMIN — SODIUM CHLORIDE 1000 ML: 900 INJECTION, SOLUTION INTRAVENOUS at 16:59

## 2020-03-11 RX ADMIN — MORPHINE SULFATE 4 MG: 4 INJECTION, SOLUTION INTRAMUSCULAR; INTRAVENOUS at 17:00

## 2020-03-11 RX ADMIN — KETOROLAC TROMETHAMINE 15 MG: 15 INJECTION, SOLUTION INTRAMUSCULAR; INTRAVENOUS at 17:00

## 2020-03-11 RX ADMIN — ONDANSETRON 4 MG: 2 INJECTION INTRAMUSCULAR; INTRAVENOUS at 17:00

## 2020-03-11 RX ADMIN — Medication 10 ML: at 16:27

## 2020-03-12 LAB — BACTERIA SPEC AEROBE CULT: NORMAL

## 2020-03-12 NOTE — ED PROVIDER NOTES
Subjective   Patient presents to emergency department for right-sided flank pain intermittently times months.  States she has had a flareup over the past day.  States she has had frequent urinary tract infections at least several times a month.  She currently is taking Bactrim for urinary tract infection which she started this morning and has taken 1.  Endorses associated urinary frequency.  Denies nausea/vomiting, fever/chills.      History provided by:  Patient   used: No    Flank Pain   Pain location:  R flank  Pain quality: aching and sharp    Pain radiates to:  Suprapubic region  Pain severity:  Moderate  Onset quality:  Sudden  Duration:  1 day  Timing:  Constant  Progression:  Worsening  Associated symptoms: no chest pain, no chills, no dysuria, no fever, no nausea, no shortness of breath, no sore throat and no vomiting        Review of Systems   Constitutional: Negative for chills and fever.   HENT: Negative for sore throat and trouble swallowing.    Eyes: Positive for visual disturbance.   Respiratory: Negative for shortness of breath and wheezing.    Cardiovascular: Negative for chest pain.   Gastrointestinal: Negative for abdominal pain, nausea and vomiting.   Endocrine: Negative for polyuria.   Genitourinary: Positive for flank pain and frequency. Negative for dysuria.   Musculoskeletal: Negative for back pain.   Skin: Negative for color change.   Allergic/Immunologic: Negative for immunocompromised state.   Neurological: Negative for syncope and weakness.   Hematological: Does not bruise/bleed easily.   Psychiatric/Behavioral: Negative for confusion.       Past Medical History:   Diagnosis Date   • Arthritis    • Disease of thyroid gland    • Hypertension    • TIA (transient ischemic attack)        No Known Allergies    Past Surgical History:   Procedure Laterality Date   • APPENDECTOMY     • CHOLECYSTECTOMY     • COLONOSCOPY N/A 1/30/2020    Procedure: COLONOSCOPY;  Surgeon: Fernando  "MD Ashlyn;  Location: [facility] ENDOSCOPY;  Service: Gastroenterology   • ENDOSCOPY N/A 1/30/2020    Procedure: ESOPHAGOGASTRODUODENOSCOPY;  Surgeon: Ashlyn King MD;  Location: [facility] ENDOSCOPY;  Service: Gastroenterology   • HYSTERECTOMY     • TOTAL KNEE ARTHROPLASTY Bilateral        History reviewed. No pertinent family history.    Social History     Socioeconomic History   • Marital status:      Spouse name: Not on file   • Number of children: Not on file   • Years of education: Not on file   • Highest education level: Not on file   Tobacco Use   • Smoking status: Never Smoker   • Smokeless tobacco: Never Used   Substance and Sexual Activity   • Alcohol use: No     Frequency: Never   • Drug use: Never   • Sexual activity: Defer           Objective      BP (!) 181/78   Pulse 72   Temp 98 °F (36.7 °C) (Oral)   Resp 18   Ht 170.2 cm (67\")   Wt 107 kg (236 lb 1.6 oz)   SpO2 92%   BMI 36.98 kg/m²     Physical Exam   Constitutional: She is oriented to person, place, and time. She appears well-developed and well-nourished. No distress.   HENT:   Head: Normocephalic and atraumatic.   Eyes: Conjunctivae are normal.   Cardiovascular: Normal rate, regular rhythm, normal heart sounds and intact distal pulses.   Pulmonary/Chest: Effort normal and breath sounds normal. No respiratory distress. She has no wheezes.   Abdominal: Soft. Bowel sounds are normal. She exhibits no distension and no mass. There is tenderness (R) CVA. There is no rebound and no guarding.   Musculoskeletal: She exhibits no edema.   Neurological: She is alert and oriented to person, place, and time.   Skin: Skin is warm. Capillary refill takes less than 2 seconds.   Psychiatric: She has a normal mood and affect. Her behavior is normal. Thought content normal.   Nursing note and vitals reviewed.      Procedures           ED Course  ED Course as of Mar 11 2018   Wed Mar 11, 2020   [id number] Discussed results with patient.  Advised " discontinuation of Bactrim and start Keflex.  Follow-up with Dr. Chino for recurrent urinary tract infections.  Increase water intake.  Return for any worsening of symptoms.    [HAIDER]      ED Course User Index  [HAIDER] Vineet Faulkner PA-C      Results for orders placed or performed during the hospital encounter of 03/11/20   Comprehensive Metabolic Panel   Result Value Ref Range    Glucose 114 (H) 65 - 99 mg/dL    BUN 25 (H) 8 - 23 mg/dL    Creatinine 1.46 (H) 0.57 - 1.00 mg/dL    Sodium 141 136 - 145 mmol/L    Potassium 3.9 3.5 - 5.2 mmol/L    Chloride 102 98 - 107 mmol/L    CO2 25.0 22.0 - 29.0 mmol/L    Calcium 9.5 8.6 - 10.5 mg/dL    Total Protein 7.3 6.0 - 8.5 g/dL    Albumin 4.00 3.50 - 5.20 g/dL    ALT (SGPT) 41 (H) 1 - 33 U/L    AST (SGOT) 54 (H) 1 - 32 U/L    Alkaline Phosphatase 53 39 - 117 U/L    Total Bilirubin <0.2 (L) 0.2 - 1.2 mg/dL    eGFR Non African Amer 36 (L) >60 mL/min/1.73    Globulin 3.3 gm/dL    A/G Ratio 1.2 g/dL    BUN/Creatinine Ratio 17.1 7.0 - 25.0    Anion Gap 14.0 5.0 - 15.0 mmol/L   Urinalysis With Culture If Indicated - Urine, Clean Catch   Result Value Ref Range    Color, UA Dark Yellow Yellow, Straw, Dark Yellow, Sondra    Appearance, UA Turbid (A) Clear    pH, UA 6.0 5.0 - 9.0    Specific Gravity, UA 1.028 1.003 - 1.030    Glucose, UA Negative Negative    Ketones, UA Trace (A) Negative    Bilirubin, UA Small (1+) (A) Negative    Blood, UA Large (3+) (A) Negative    Protein, UA Trace (A) Negative    Leuk Esterase, UA Large (3+) (A) Negative    Nitrite, UA Negative Negative    Urobilinogen, UA 1.0 E.U./dL 0.2 - 1.0 E.U./dL   Lipase   Result Value Ref Range    Lipase 37 13 - 60 U/L   CBC Auto Differential   Result Value Ref Range    WBC 10.36 3.40 - 10.80 10*3/mm3    RBC 4.15 3.77 - 5.28 10*6/mm3    Hemoglobin 12.3 12.0 - 15.9 g/dL    Hematocrit 36.6 34.0 - 46.6 %    MCV 88.2 79.0 - 97.0 fL    MCH 29.6 26.6 - 33.0 pg    MCHC 33.6 31.5 - 35.7 g/dL    RDW 13.6 12.3 - 15.4 %     RDW-SD 43.6 37.0 - 54.0 fl    MPV 9.6 6.0 - 12.0 fL    Platelets 253 140 - 450 10*3/mm3    Neutrophil % 65.3 42.7 - 76.0 %    Lymphocyte % 23.6 19.6 - 45.3 %    Monocyte % 7.0 5.0 - 12.0 %    Eosinophil % 2.1 0.3 - 6.2 %    Basophil % 0.7 0.0 - 1.5 %    Immature Grans % 1.3 (H) 0.0 - 0.5 %    Neutrophils, Absolute 6.76 1.70 - 7.00 10*3/mm3    Lymphocytes, Absolute 2.45 0.70 - 3.10 10*3/mm3    Monocytes, Absolute 0.73 0.10 - 0.90 10*3/mm3    Eosinophils, Absolute 0.22 0.00 - 0.40 10*3/mm3    Basophils, Absolute 0.07 0.00 - 0.20 10*3/mm3    Immature Grans, Absolute 0.13 (H) 0.00 - 0.05 10*3/mm3    nRBC 0.0 0.0 - 0.2 /100 WBC   Urinalysis, Microscopic Only - Urine, Clean Catch   Result Value Ref Range    RBC, UA 6-12 (A) None Seen /HPF    WBC, UA 13-20 (A) None Seen, 0-2, 3-5 /HPF    Bacteria, UA 4+ (A) None Seen /HPF    Squamous Epithelial Cells, UA 3-5 (A) None Seen, 0-2 /HPF    Hyaline Casts, UA 21-30 None Seen /LPF    Methodology Manual Light Microscopy    Light Blue Top   Result Value Ref Range    Extra Tube hold for add-on    Gold Top - SST   Result Value Ref Range    Extra Tube Hold for add-ons.      Xr Ribs Right With Pa Chest    Result Date: 3/9/2020  Narrative: EXAM DESCRIPTION:  XR RIBS RIGHT W PA CHEST CLINICAL HISTORY: 69 years  Female  r flank pain, R10.9 Unspecified abdominal pain COMPARISON: January 2020 TECHNIQUE: Three views-PA radiograph of the chest with two additional rib detail views FINDINGS: The lungs are well-expanded and clear. The cardiac silhouette and pulmonary vasculature are within normal limits. There are no pleural effusions. Rib detail views on the right demonstrate no evidence of an acute fracture or focal bony abnormality. There is no measurable pneumothorax.     Impression: 1. No radiographic evidence of acute cardiopulmonary disease. 2. Negative right rib detail views. Electronically signed by:  Kellie Whalen MD  3/9/2020 11:02 AM CDT Workstation: [id number]    Nm Gastric  Emptying    Result Date: 2/18/2020  Narrative: Nuclear medicine gastric emptying study History: Nausea and vomiting. Correlation: None Following the oral administration of 1.1 millicuries of technetium 99m sulfur colloid in oatmeal, a gastric emptying study was performed. FINDINGS: Somewhat rapid gastric emptying time of 29 minutes. No gastroesophageal reflux demonstrated.     Impression: Conclusion: Somewhat rapid gastric emptying time of 29 minutes. [id number] Electronically signed by:  Holden Rodriguez MD  2/18/2020 10:59 AM CST Workstation: [id number]    Ct Abdomen Pelvis Stone Protocol    Result Date: 3/11/2020  Narrative: EXAM: CT ABDOMEN PELVIS WITHOUT IV CONTRAST ORDERING PROVIDER: LUBNA HOLLIDAY CLINICAL HISTORY: Flank pain on left side COMPARISON: TECHNIQUE: CT abdomen and pelvis performed without IV or oral contrast, reformatted in the sagittal and coronal planes. This examination was performed according to our departmental dose optimization program which includes automated exposure control, adjustment of the MA and kV according to patient size, and/or use of iterative reconstruction technique. FINDINGS: BASILAR CHEST: Calcified granuloma in the left lower lung measuring up to 1.3 cm from prior granulomatous disease. No consolidation, nodule or effusion. LIVER: No mass, enlargement or abnormal density. Diffuse fatty change of liver. BILIARY TRACT: Unremarkable caliber with prior cholecystectomy. SPLEEN: No mass or enlargement. PANCREAS: No mass or inflammatory process. Normal pancreatic duct ADRENAL GLANDS: Unremarkable. No mass. URINARY SYSTEM: Kidneys are normal in size. No hydronephrosis, or mass. Normal ureters.  Bladder is normal without mass or stone. Left nephrolithiasis measuring up to 1.0 cm. 0.9 cm left renal cyst. GI TRACT: No mass, dilation, or wall thickening.  Uncomplicated colonic diverticula.  No hernia.  Appendix is not well-visualized.  REPRODUCTIVE SYSTEM: Unremarkable PERITONEAL  SPACE:No free air, free fluid, mass or adenopathy. RETROPERITONEAL SPACE:  No adenopathy, mass, aneurysm or significant vascular abnormality. BONES AND EXTRA-ABDOMINAL SOFT TISSUES: No aggressive osseous lesion..  No inguinal adenopathy or hernia.     Impression: Uncomplicated colonic diverticulosis. Left nephrolithiasis measuring 1 cm. 0.9 cm left renal cyst. Diffuse fatty change of liver. Prior cholecystectomy. No evidence of obstructive uropathy on either side. No evidence of bowel obstruction or perienteric inflammation. Electronically signed by:  Shiv Coronado MD  3/11/2020 5:31 PM CDT Workstation: [id number]       Discussed results with patient.  Gave educational materials.  Advised close follow up with PCP/Urology.  Return to emergency department for new or worsening symptoms.                                    MDM    Final diagnoses:   Right flank pain   Urinary tract infection with hematuria, site unspecified            Vineet Faulkner PA-C  03/11/20 2018

## 2020-03-16 DIAGNOSIS — R35.1 NOCTURIA: Primary | ICD-10-CM

## 2020-03-18 RX ORDER — LOVASTATIN 40 MG/1
40 TABLET ORAL NIGHTLY
Qty: 30 TABLET | Refills: 5 | Status: SHIPPED | OUTPATIENT
Start: 2020-03-18 | End: 2020-03-18 | Stop reason: SDUPTHER

## 2020-03-18 RX ORDER — LOVASTATIN 40 MG/1
40 TABLET ORAL NIGHTLY
Qty: 30 TABLET | Refills: 5 | Status: SHIPPED | OUTPATIENT
Start: 2020-03-18 | End: 2020-06-19

## 2020-04-08 ENCOUNTER — HOSPITAL ENCOUNTER (EMERGENCY)
Facility: HOSPITAL | Age: 70
Discharge: HOME | End: 2020-04-08
Attending: EMERGENCY MEDICINE | Admitting: EMERGENCY MEDICINE

## 2020-04-08 ENCOUNTER — APPOINTMENT (OUTPATIENT)
Dept: CT IMAGING | Facility: HOSPITAL | Age: 70
End: 2020-04-08

## 2020-04-08 VITALS
HEIGHT: 67 IN | WEIGHT: 230.8 LBS | BODY MASS INDEX: 36.22 KG/M2 | RESPIRATION RATE: 20 BRPM | DIASTOLIC BLOOD PRESSURE: 68 MMHG | OXYGEN SATURATION: 97 % | HEART RATE: 60 BPM | SYSTOLIC BLOOD PRESSURE: 161 MMHG | TEMPERATURE: 98 F

## 2020-04-08 DIAGNOSIS — R51.9 NONINTRACTABLE HEADACHE, UNSPECIFIED CHRONICITY PATTERN, UNSPECIFIED HEADACHE TYPE: Primary | ICD-10-CM

## 2020-04-08 LAB
ALBUMIN SERPL-MCNC: 4.3 G/DL (ref 3.5–5.2)
ALBUMIN/GLOB SERPL: 1.3 G/DL
ALP SERPL-CCNC: 49 U/L (ref 39–117)
ALT SERPL W P-5'-P-CCNC: 23 U/L (ref 1–33)
ANION GAP SERPL CALCULATED.3IONS-SCNC: 14 MMOL/L (ref 5–15)
AST SERPL-CCNC: 29 U/L (ref 1–32)
BASOPHILS # BLD AUTO: 0.06 10*3/MM3 (ref 0–0.2)
BASOPHILS NFR BLD AUTO: 0.6 % (ref 0–1.5)
BILIRUB SERPL-MCNC: 0.3 MG/DL (ref 0.2–1.2)
BUN BLD-MCNC: 19 MG/DL (ref 8–23)
BUN/CREAT SERPL: 15.8 (ref 7–25)
CALCIUM SPEC-SCNC: 9.7 MG/DL (ref 8.6–10.5)
CHLORIDE SERPL-SCNC: 101 MMOL/L (ref 98–107)
CO2 SERPL-SCNC: 22 MMOL/L (ref 22–29)
CREAT BLD-MCNC: 1.2 MG/DL (ref 0.57–1)
DEPRECATED RDW RBC AUTO: 43.1 FL (ref 37–54)
EOSINOPHIL # BLD AUTO: 0.16 10*3/MM3 (ref 0–0.4)
EOSINOPHIL NFR BLD AUTO: 1.7 % (ref 0.3–6.2)
ERYTHROCYTE [DISTWIDTH] IN BLOOD BY AUTOMATED COUNT: 13.4 % (ref 12.3–15.4)
GFR SERPL CREATININE-BSD FRML MDRD: 45 ML/MIN/1.73
GLOBULIN UR ELPH-MCNC: 3.3 GM/DL
GLUCOSE BLD-MCNC: 123 MG/DL (ref 65–99)
HCT VFR BLD AUTO: 39.4 % (ref 34–46.6)
HGB BLD-MCNC: 12.8 G/DL (ref 12–15.9)
HOLD SPECIMEN: NORMAL
IMM GRANULOCYTES # BLD AUTO: 0.08 10*3/MM3 (ref 0–0.05)
IMM GRANULOCYTES NFR BLD AUTO: 0.9 % (ref 0–0.5)
INR PPP: 1.08 (ref 0.8–1.2)
LYMPHOCYTES # BLD AUTO: 2.1 10*3/MM3 (ref 0.7–3.1)
LYMPHOCYTES NFR BLD AUTO: 22.7 % (ref 19.6–45.3)
MCH RBC QN AUTO: 28.2 PG (ref 26.6–33)
MCHC RBC AUTO-ENTMCNC: 32.5 G/DL (ref 31.5–35.7)
MCV RBC AUTO: 86.8 FL (ref 79–97)
MONOCYTES # BLD AUTO: 0.66 10*3/MM3 (ref 0.1–0.9)
MONOCYTES NFR BLD AUTO: 7.1 % (ref 5–12)
NEUTROPHILS # BLD AUTO: 6.19 10*3/MM3 (ref 1.7–7)
NEUTROPHILS NFR BLD AUTO: 67 % (ref 42.7–76)
NRBC BLD AUTO-RTO: 0 /100 WBC (ref 0–0.2)
PLATELET # BLD AUTO: 266 10*3/MM3 (ref 140–450)
PMV BLD AUTO: 9.4 FL (ref 6–12)
POTASSIUM BLD-SCNC: 4.5 MMOL/L (ref 3.5–5.2)
PROT SERPL-MCNC: 7.6 G/DL (ref 6–8.5)
PROTHROMBIN TIME: 13.8 SECONDS (ref 11.1–15.3)
RBC # BLD AUTO: 4.54 10*6/MM3 (ref 3.77–5.28)
SODIUM BLD-SCNC: 137 MMOL/L (ref 136–145)
TROPONIN T SERPL-MCNC: <0.01 NG/ML (ref 0–0.03)
WBC NRBC COR # BLD: 9.25 10*3/MM3 (ref 3.4–10.8)

## 2020-04-08 PROCEDURE — 25010000002 DIPHENHYDRAMINE PER 50 MG: Performed by: EMERGENCY MEDICINE

## 2020-04-08 PROCEDURE — 99283 EMERGENCY DEPT VISIT LOW MDM: CPT

## 2020-04-08 PROCEDURE — 25010000002 HYDRALAZINE PER 20 MG: Performed by: EMERGENCY MEDICINE

## 2020-04-08 PROCEDURE — 85025 COMPLETE CBC W/AUTO DIFF WBC: CPT | Performed by: EMERGENCY MEDICINE

## 2020-04-08 PROCEDURE — 96375 TX/PRO/DX INJ NEW DRUG ADDON: CPT

## 2020-04-08 PROCEDURE — 25010000002 METOCLOPRAMIDE PER 10 MG: Performed by: EMERGENCY MEDICINE

## 2020-04-08 PROCEDURE — 85610 PROTHROMBIN TIME: CPT | Performed by: EMERGENCY MEDICINE

## 2020-04-08 PROCEDURE — 70450 CT HEAD/BRAIN W/O DYE: CPT

## 2020-04-08 PROCEDURE — 80053 COMPREHEN METABOLIC PANEL: CPT | Performed by: EMERGENCY MEDICINE

## 2020-04-08 PROCEDURE — 96374 THER/PROPH/DIAG INJ IV PUSH: CPT

## 2020-04-08 PROCEDURE — 84484 ASSAY OF TROPONIN QUANT: CPT | Performed by: EMERGENCY MEDICINE

## 2020-04-08 RX ORDER — DIPHENHYDRAMINE HYDROCHLORIDE 50 MG/ML
25 INJECTION INTRAMUSCULAR; INTRAVENOUS ONCE
Status: COMPLETED | OUTPATIENT
Start: 2020-04-08 | End: 2020-04-08

## 2020-04-08 RX ORDER — HYDRALAZINE HYDROCHLORIDE 20 MG/ML
10 INJECTION INTRAMUSCULAR; INTRAVENOUS ONCE
Status: DISCONTINUED | OUTPATIENT
Start: 2020-04-08 | End: 2020-04-08

## 2020-04-08 RX ORDER — LABETALOL HYDROCHLORIDE 5 MG/ML
10 INJECTION, SOLUTION INTRAVENOUS ONCE
Status: DISCONTINUED | OUTPATIENT
Start: 2020-04-08 | End: 2020-04-08

## 2020-04-08 RX ORDER — HYDRALAZINE HYDROCHLORIDE 20 MG/ML
10 INJECTION INTRAMUSCULAR; INTRAVENOUS ONCE
Status: COMPLETED | OUTPATIENT
Start: 2020-04-08 | End: 2020-04-08

## 2020-04-08 RX ORDER — METOCLOPRAMIDE HYDROCHLORIDE 5 MG/ML
10 INJECTION INTRAMUSCULAR; INTRAVENOUS ONCE
Status: COMPLETED | OUTPATIENT
Start: 2020-04-08 | End: 2020-04-08

## 2020-04-08 RX ORDER — SODIUM CHLORIDE 0.9 % (FLUSH) 0.9 %
10 SYRINGE (ML) INJECTION AS NEEDED
Status: DISCONTINUED | OUTPATIENT
Start: 2020-04-08 | End: 2020-04-08 | Stop reason: HOSPADM

## 2020-04-08 RX ADMIN — METOCLOPRAMIDE 10 MG: 5 INJECTION, SOLUTION INTRAMUSCULAR; INTRAVENOUS at 10:20

## 2020-04-08 RX ADMIN — HYDRALAZINE HYDROCHLORIDE 10 MG: 20 INJECTION INTRAMUSCULAR; INTRAVENOUS at 10:52

## 2020-04-08 RX ADMIN — DIPHENHYDRAMINE HYDROCHLORIDE 25 MG: 50 INJECTION INTRAMUSCULAR; INTRAVENOUS at 10:20

## 2020-04-08 NOTE — ED PROVIDER NOTES
Subjective   History of Present Illness  This is a 69-year-old female who presents with posterior left-sided and headache.  Patient has elevated blood pressure on arrival despite having taken her blood pressure medication the morning.  She has a past medical history of hypertension, hyperlipidemia, hypothyroidism, spinal stenosis, and osteoarthritis.  Patient states that her posterior left side of her head been hurting for the past 2 weeks.  She describes it as pressure-like and says it is there all the time and has recently gotten worse.  She states that she previously had a similar type of posterior headache but on the right side and this was in 2015, same time she had a mini stroke.  She states she is compliant with her blood pressure medications as well as all medications.  Also endorses occasional sharp left-sided ear pain.  Her headache triggers her nausea and vomiting.  She states that Tylenol, ibuprofen, or Aleve did not help.  Denies any vision changes.  Review of Systems   Constitutional: Negative for activity change, appetite change, chills, diaphoresis, fatigue and fever.   HENT: Negative for congestion, sinus pressure, sinus pain and sore throat.    Eyes: Negative for photophobia and visual disturbance.   Respiratory: Negative for apnea, cough, chest tightness and shortness of breath.    Cardiovascular: Negative for chest pain, palpitations and leg swelling.   Gastrointestinal: Positive for nausea. Negative for abdominal distention, abdominal pain, constipation, diarrhea and vomiting.   Genitourinary: Negative for pelvic pain.   Musculoskeletal: Negative for arthralgias, back pain, gait problem, joint swelling and myalgias.   Skin: Negative for color change, pallor and rash.   Neurological: Positive for headaches. Negative for dizziness, syncope, weakness, light-headedness and numbness.   Psychiatric/Behavioral: Negative for agitation, behavioral problems, confusion and decreased concentration.        Past Medical History:   Diagnosis Date   • Arthritis    • Disease of thyroid gland    • Hypertension    • TIA (transient ischemic attack)        No Known Allergies    Past Surgical History:   Procedure Laterality Date   • APPENDECTOMY     • CHOLECYSTECTOMY     • COLONOSCOPY N/A 1/30/2020    Procedure: COLONOSCOPY;  Surgeon: Ashlyn King MD;  Location: [facility] ENDOSCOPY;  Service: Gastroenterology   • ENDOSCOPY N/A 1/30/2020    Procedure: ESOPHAGOGASTRODUODENOSCOPY;  Surgeon: Ashlyn King MD;  Location: [facility] ENDOSCOPY;  Service: Gastroenterology   • HYSTERECTOMY     • TOTAL KNEE ARTHROPLASTY Bilateral        History reviewed. No pertinent family history.    Social History     Socioeconomic History   • Marital status:      Spouse name: Not on file   • Number of children: Not on file   • Years of education: Not on file   • Highest education level: Not on file   Tobacco Use   • Smoking status: Never Smoker   • Smokeless tobacco: Never Used   Substance and Sexual Activity   • Alcohol use: No     Frequency: Never   • Drug use: Never   • Sexual activity: Defer           Objective   Physical Exam   Constitutional: She is oriented to person, place, and time. She appears well-developed and well-nourished.   HENT:   Head: Normocephalic and atraumatic.       Right Ear: External ear normal.   Left Ear: External ear normal.   Nose: Nose normal.   Mouth/Throat: Oropharynx is clear and moist.   Eyes: Pupils are equal, round, and reactive to light. Conjunctivae and EOM are normal.   Neck: Normal range of motion. Neck supple.   Cardiovascular: Normal rate, regular rhythm, normal heart sounds and intact distal pulses. Exam reveals no gallop and no friction rub.   No murmur heard.  Pulmonary/Chest: Effort normal and breath sounds normal. No stridor. No respiratory distress. She has no wheezes. She exhibits no tenderness.   Abdominal: Soft. Bowel sounds are normal. She exhibits no distension and no mass. There  is no tenderness. There is no guarding.   Musculoskeletal: Normal range of motion. She exhibits no edema.   Neurological: She is alert and oriented to person, place, and time. No cranial nerve deficit.   Skin: Skin is warm and dry.   Psychiatric: She has a normal mood and affect. Her behavior is normal. Judgment and thought content normal.       Procedures           ED Course  ED Course as of Apr 08 1102 Wed Apr 08, 2020   1058 Blood pressure well controlled with hydralazine.  Labs unremarkable.  CT head unremarkable.  Patient states that she feels good enough to go home.    [AS]      ED Course User Index  [AS] Miguel Barry MD      Lab Results (last 24 hours)     Procedure Component Value Units Date/Time    Comprehensive Metabolic Panel [[id number]]  (Abnormal) Collected:  04/08/20 1020    Specimen:  Blood Updated:  04/08/20 1044     Glucose 123 mg/dL      BUN 19 mg/dL      Creatinine 1.20 mg/dL      Sodium 137 mmol/L      Potassium 4.5 mmol/L      Chloride 101 mmol/L      CO2 22.0 mmol/L      Calcium 9.7 mg/dL      Total Protein 7.6 g/dL      Albumin 4.30 g/dL      ALT (SGPT) 23 U/L      AST (SGOT) 29 U/L      Alkaline Phosphatase 49 U/L      Total Bilirubin 0.3 mg/dL      eGFR Non African Amer 45 mL/min/1.73      Globulin 3.3 gm/dL      A/G Ratio 1.3 g/dL      BUN/Creatinine Ratio 15.8     Anion Gap 14.0 mmol/L     Narrative:       GFR Normal >60  Chronic Kidney Disease <60  Kidney Failure <15      Troponin [[id number]]  (Normal) Collected:  04/08/20 1020    Specimen:  Blood Updated:  04/08/20 1044     Troponin T <0.010 ng/mL     Narrative:       Troponin T Reference Range:  <= 0.03 ng/mL-   Negative for AMI  >0.03 ng/mL-     Abnormal for myocardial necrosis.  Clinicians would have to utilize clinical acumen, EKG, Troponin and serial changes to determine if it is an Acute Myocardial Infarction or myocardial injury due to an underlying chronic condition.       Results may be falsely decreased if patient taking  Biotin.      Protime-INR [[id number]]  (Normal) Collected:  04/08/20 1020    Specimen:  Blood Updated:  04/08/20 1035     Protime 13.8 Seconds      INR 1.08    Narrative:       Therapeutic range for most indications is 2.0-3.0 INR,  or 2.5-3.5 for mechanical heart valves.    CBC & Differential [[id number]] Collected:  04/08/20 1020    Specimen:  Blood Updated:  04/08/20 1025    Narrative:       The following orders were created for panel order CBC & Differential.  Procedure                               Abnormality         Status                     ---------                               -----------         ------                     CBC Auto Differential[[id number]]        Abnormal            Final result                 Please view results for these tests on the individual orders.    CBC Auto Differential [[id number]]  (Abnormal) Collected:  04/08/20 1020    Specimen:  Blood Updated:  04/08/20 1025     WBC 9.25 10*3/mm3      RBC 4.54 10*6/mm3      Hemoglobin 12.8 g/dL      Hematocrit 39.4 %      MCV 86.8 fL      MCH 28.2 pg      MCHC 32.5 g/dL      RDW 13.4 %      RDW-SD 43.1 fl      MPV 9.4 fL      Platelets 266 10*3/mm3      Neutrophil % 67.0 %      Lymphocyte % 22.7 %      Monocyte % 7.1 %      Eosinophil % 1.7 %      Basophil % 0.6 %      Immature Grans % 0.9 %      Neutrophils, Absolute 6.19 10*3/mm3      Lymphocytes, Absolute 2.10 10*3/mm3      Monocytes, Absolute 0.66 10*3/mm3      Eosinophils, Absolute 0.16 10*3/mm3      Basophils, Absolute 0.06 10*3/mm3      Immature Grans, Absolute 0.08 10*3/mm3      nRBC 0.0 /100 WBC     Extra Tubes [[id number]] Collected:  04/08/20 1022    Specimen:  Blood, Venous Line Updated:  04/08/20 1022    Narrative:       The following orders were created for panel order Extra Tubes.  Procedure                               Abnormality         Status                     ---------                               -----------         ------                     Light Blue Top[[id number]]                                                               [facility][[id number]]                                   In process                   Please view results for these tests on the individual orders.    Gold Top - SST [[id number]] Collected:  04/08/20 1022    Specimen:  Blood Updated:  04/08/20 1022        Imaging Results (Last 24 Hours)     Procedure Component Value Units Date/Time    CT Head Without Contrast [[id number]] Collected:  04/08/20 1024     Updated:  04/08/20 1049    Narrative:       EXAM: CT HEAD WO CONTRAST    DATE: 4/8/2020 10:24 AM CDT    TECHNIQUE: Axial images were obtained at 5 mm intervals from the  level of the skull base to the vertex. No IV contrast was  administered. CTDI 50.10    This exam was performed according to our departmental  dose-optimization program, which includes automated exposure  control, adjustment of the mA and/or kV according to patient size  and/or use of iterative reconstruction technique.    CLINICAL INDICATION: HA neck pain    COMPARISON: February 2014    FINDINGS: There is no evidence of an intracranial mass or  hemorrhage. There is mild generalized cortical atrophy congruent  with the patient's age. The ventricles are normal in size and  configuration. There is no evidence of acute ischemia/macro  infarction. No extra-axial fluid collections are identified.    Bone window images are within normal limits. The paranasal  sinuses are clear.      Impression:       1. Unremarkable unenhanced CT scan of the brain.    Electronically signed by:  Kellie Whalen MD  4/8/2020 10:48 AM  CDT Workstation: [id number]                                           [facility]  Number of Diagnoses or Management Options  Nonintractable headache, unspecified chronicity pattern, unspecified headache type:   Diagnosis management comments: This is a 69-year-old female with a past medical history of hypertension, hyperlipidemia, hypothyroidism, spinal stenosis, osteoarthritis, who presents for  musculoskeletal related posterior headache.  Blood pressure was stabilized in the ED.  Imaging and lab work was unremarkable.  Patient was stable for discharge.      Final diagnoses:   Nonintractable headache, unspecified chronicity pattern, unspecified headache type               This document has been electronically signed by Miguel Barry MD on April 8, 2020 11:02      EMR Dragon/transcription disclaimer: Much of this encounter note was created utilizing an electronic transcription/translation of spoken language to printed text.  Electronic translation of spoken language may permit erroneous, or at times, nonsensical words or phrases to be in advertently transcribed; although I have reviewed the note for such errors to the best of my ability, some may still exist.       Miguel Barry MD  Resident  04/08/20 9968

## 2020-04-14 ENCOUNTER — TELEPHONE (OUTPATIENT)
Dept: FAMILY MEDICINE CLINIC | Facility: CLINIC | Age: 70
End: 2020-04-14

## 2020-04-14 RX ORDER — CYANOCOBALAMIN 1000 UG/ML
1000 INJECTION, SOLUTION INTRAMUSCULAR; SUBCUTANEOUS
Qty: 1 ML | Refills: 0 | Status: SHIPPED | OUTPATIENT
Start: 2020-04-14 | End: 2020-04-22

## 2020-04-14 RX ORDER — SYRINGE W-NEEDLE,DISPOSAB,3 ML 25GX5/8"
SYRINGE, EMPTY DISPOSABLE MISCELLANEOUS
Qty: 1 EACH | Refills: 0 | Status: SHIPPED | OUTPATIENT
Start: 2020-04-14 | End: 2020-06-18 | Stop reason: SDUPTHER

## 2020-04-14 NOTE — TELEPHONE ENCOUNTER
----- Message from Watson Lockwood sent at 4/14/2020  1:26 PM CDT -----  Regarding: B12 Injection  Contact: [phone]  Patient called; wants to know if B12 can be called in to pharmacy and her niece can give her the injection.    [facility]

## 2020-04-22 RX ORDER — ESCITALOPRAM OXALATE 10 MG/1
TABLET ORAL
Qty: 30 TABLET | Refills: 3 | Status: SHIPPED | OUTPATIENT
Start: 2020-04-22 | End: 2020-06-19

## 2020-04-22 RX ORDER — METOPROLOL SUCCINATE 100 MG/1
TABLET, EXTENDED RELEASE ORAL
Qty: 90 TABLET | Refills: 3 | Status: SHIPPED | OUTPATIENT
Start: 2020-04-22 | End: 2020-06-19

## 2020-04-22 RX ORDER — CYANOCOBALAMIN 1000 UG/ML
INJECTION, SOLUTION INTRAMUSCULAR; SUBCUTANEOUS
Qty: 1 ML | Refills: 3 | Status: SHIPPED | OUTPATIENT
Start: 2020-04-22 | End: 2020-06-18 | Stop reason: SDUPTHER

## 2020-04-28 RX ORDER — OLMESARTAN MEDOXOMIL AND HYDROCHLOROTHIAZIDE 40/12.5 40; 12.5 MG/1; MG/1
1 TABLET ORAL DAILY
Qty: 90 TABLET | Refills: 1 | Status: SHIPPED | OUTPATIENT
Start: 2020-04-28 | End: 2021-01-04 | Stop reason: SDUPTHER

## 2020-04-28 NOTE — TELEPHONE ENCOUNTER
Dr.Kristy Rubio Patient.  Last office appt was on 03/09/2020           Next appt is due around 06/09/2020.    [facility] is requesting a 90 day supply on the Olmesartan HCTZ 40/12.5mg per Patients insurance.

## 2020-05-13 RX ORDER — TRAZODONE HYDROCHLORIDE 300 MG/1
300 TABLET ORAL NIGHTLY
Qty: 30 TABLET | Refills: 5 | Status: SHIPPED | OUTPATIENT
Start: 2020-05-13 | End: 2020-05-15

## 2020-05-15 RX ORDER — TRAZODONE HYDROCHLORIDE 150 MG/1
150 TABLET ORAL NIGHTLY
Qty: 30 TABLET | Refills: 5 | Status: SHIPPED | OUTPATIENT
Start: 2020-05-15 | End: 2020-06-19

## 2020-06-16 ENCOUNTER — PREP FOR SURGERY (OUTPATIENT)
Dept: OTHER | Facility: HOSPITAL | Age: 70
End: 2020-06-16

## 2020-06-16 DIAGNOSIS — N20.0 CALCULUS OF KIDNEY: Primary | ICD-10-CM

## 2020-06-18 RX ORDER — SYRINGE W-NEEDLE,DISPOSAB,3 ML 25GX5/8"
SYRINGE, EMPTY DISPOSABLE MISCELLANEOUS
Qty: 1 EACH | Refills: 3 | Status: SHIPPED | OUTPATIENT
Start: 2020-06-18 | End: 2020-12-01 | Stop reason: SDUPTHER

## 2020-06-18 RX ORDER — CYANOCOBALAMIN 1000 UG/ML
1000 INJECTION, SOLUTION INTRAMUSCULAR; SUBCUTANEOUS
Qty: 1 ML | Refills: 3 | Status: SHIPPED | OUTPATIENT
Start: 2020-06-18 | End: 2020-12-01 | Stop reason: SDUPTHER

## 2020-06-19 ENCOUNTER — APPOINTMENT (OUTPATIENT)
Dept: PREADMISSION TESTING | Facility: HOSPITAL | Age: 70
End: 2020-06-19

## 2020-06-19 VITALS
HEART RATE: 79 BPM | OXYGEN SATURATION: 97 % | HEIGHT: 67 IN | SYSTOLIC BLOOD PRESSURE: 180 MMHG | BODY MASS INDEX: 32.96 KG/M2 | WEIGHT: 210 LBS | DIASTOLIC BLOOD PRESSURE: 80 MMHG | RESPIRATION RATE: 22 BRPM

## 2020-06-19 DIAGNOSIS — N20.0 CALCULUS OF KIDNEY: ICD-10-CM

## 2020-06-19 LAB
ANION GAP SERPL CALCULATED.3IONS-SCNC: 12 MMOL/L (ref 5–15)
BILIRUB UR QL STRIP: NEGATIVE
BUN BLD-MCNC: 22 MG/DL (ref 8–23)
BUN/CREAT SERPL: 20.4 (ref 7–25)
CALCIUM SPEC-SCNC: 9.3 MG/DL (ref 8.6–10.5)
CHLORIDE SERPL-SCNC: 103 MMOL/L (ref 98–107)
CLARITY UR: ABNORMAL
CO2 SERPL-SCNC: 25 MMOL/L (ref 22–29)
COLOR UR: YELLOW
CREAT BLD-MCNC: 1.08 MG/DL (ref 0.57–1)
GFR SERPL CREATININE-BSD FRML MDRD: 50 ML/MIN/1.73
GLUCOSE BLD-MCNC: 152 MG/DL (ref 65–99)
GLUCOSE UR STRIP-MCNC: NEGATIVE MG/DL
HGB UR QL STRIP.AUTO: ABNORMAL
KETONES UR QL STRIP: NEGATIVE
LEUKOCYTE ESTERASE UR QL STRIP.AUTO: ABNORMAL
NITRITE UR QL STRIP: NEGATIVE
PH UR STRIP.AUTO: 6 [PH] (ref 5–9)
POTASSIUM BLD-SCNC: 4.7 MMOL/L (ref 3.5–5.2)
PROT UR QL STRIP: ABNORMAL
SODIUM BLD-SCNC: 140 MMOL/L (ref 136–145)
SP GR UR STRIP: 1.02 (ref 1–1.03)
UROBILINOGEN UR QL STRIP: ABNORMAL

## 2020-06-19 PROCEDURE — 93005 ELECTROCARDIOGRAM TRACING: CPT

## 2020-06-19 PROCEDURE — 36415 COLL VENOUS BLD VENIPUNCTURE: CPT

## 2020-06-19 PROCEDURE — 93010 ELECTROCARDIOGRAM REPORT: CPT | Performed by: INTERNAL MEDICINE

## 2020-06-19 PROCEDURE — 80048 BASIC METABOLIC PNL TOTAL CA: CPT | Performed by: ANESTHESIOLOGY

## 2020-06-19 PROCEDURE — 81003 URINALYSIS AUTO W/O SCOPE: CPT | Performed by: UROLOGY

## 2020-06-19 RX ORDER — KETOROLAC TROMETHAMINE 10 MG/1
10 TABLET, FILM COATED ORAL 2 TIMES DAILY PRN
COMMUNITY
End: 2020-09-23

## 2020-06-19 RX ORDER — NITROFURANTOIN MACROCRYSTALS 100 MG/1
100 CAPSULE ORAL DAILY
COMMUNITY
End: 2020-09-23

## 2020-06-19 RX ORDER — SODIUM CHLORIDE, SODIUM GLUCONATE, SODIUM ACETATE, POTASSIUM CHLORIDE, AND MAGNESIUM CHLORIDE 526; 502; 368; 37; 30 MG/100ML; MG/100ML; MG/100ML; MG/100ML; MG/100ML
1000 INJECTION, SOLUTION INTRAVENOUS CONTINUOUS
Status: CANCELLED | OUTPATIENT
Start: 2020-06-25

## 2020-06-19 RX ORDER — OXYBUTYNIN CHLORIDE 10 MG/1
10 TABLET, EXTENDED RELEASE ORAL DAILY
COMMUNITY
End: 2021-03-18

## 2020-06-19 RX ORDER — METOPROLOL SUCCINATE 100 MG/1
100 TABLET, EXTENDED RELEASE ORAL DAILY
COMMUNITY
End: 2020-07-21 | Stop reason: SDUPTHER

## 2020-06-19 RX ORDER — ESCITALOPRAM OXALATE 10 MG/1
10 TABLET ORAL DAILY
COMMUNITY
End: 2020-10-06 | Stop reason: SDUPTHER

## 2020-06-19 RX ORDER — LOVASTATIN 40 MG/1
40 TABLET ORAL DAILY
COMMUNITY
End: 2020-11-04 | Stop reason: SDUPTHER

## 2020-06-19 NOTE — DISCHARGE INSTRUCTIONS
[facility]  Pre-op Information and Guidelines    You will be called after 2 p.m. the day before your surgery (Friday for Monday surgery) and notified of your time for arrival and approximate surgery time.  If you have not received a call by 4P.M., please contact Same Day Surgery at [phone] of if outside [facility] call [phone].    Please Follow these Important Safety Guidelines:    • The morning of your procedure, take only the medications listed below with   A sip of water:_____________________________________________       ______________________________________________    • DO NOT eat or drink anything after 12:00 midnight the night before surgery  Specific instructions concerning drinking clear liquids will be discussed during  the pre-surgery instruction call the day before your surgery.    • If you take a blood thinner (ex. Plavix, Coumadin, aspirin), ask your doctor when to stop it before surgery  STOP DATE: _________________    • Only 2 visitors are allowed in patient rooms at a time  Your visitors will be asked to wait in the lobby until the admission process is complete with the exception of a parent with a child and patients in need of special assistance.    • YOU CANNOT DRIVE YOURSELF HOME  You must be accompanied by someone who will be responsible for driving you home after surgery and for your care at home.    • DO NOT chew gum, use breath mints, hard candy, or smoke the day of surgery  • DO NOT drink alcohol for at least 24 hours before your surgery  • DO NOT wear any jewelry and remove all body piercing before coming to the hospital  • DO NOT wear make-up to the hospital  • If you are having surgery on an extremity (arm/leg/foot) remove nail polish/artificial nails on the surgical side  • Clothing, glasses, contacts, dentures, and hairpieces must be removed before surgery  • Bathe the night before or the morning of your surgery and do not use powders/lotions on  skin.

## 2020-06-22 PROCEDURE — U0003 INFECTIOUS AGENT DETECTION BY NUCLEIC ACID (DNA OR RNA); SEVERE ACUTE RESPIRATORY SYNDROME CORONAVIRUS 2 (SARS-COV-2) (CORONAVIRUS DISEASE [COVID-19]), AMPLIFIED PROBE TECHNIQUE, MAKING USE OF HIGH THROUGHPUT TECHNOLOGIES AS DESCRIBED BY CMS-2020-01-R: HCPCS | Performed by: UROLOGY

## 2020-06-24 LAB
COVID LABCORP PRIORITY: NORMAL
SARS-COV-2 RNA RESP QL NAA+PROBE: NOT DETECTED

## 2020-06-25 ENCOUNTER — APPOINTMENT (OUTPATIENT)
Dept: GENERAL RADIOLOGY | Facility: HOSPITAL | Age: 70
End: 2020-06-25

## 2020-06-25 ENCOUNTER — ANESTHESIA (OUTPATIENT)
Dept: PERIOP | Facility: HOSPITAL | Age: 70
End: 2020-06-25

## 2020-06-25 ENCOUNTER — ANESTHESIA EVENT (OUTPATIENT)
Dept: PERIOP | Facility: HOSPITAL | Age: 70
End: 2020-06-25

## 2020-06-25 ENCOUNTER — HOSPITAL ENCOUNTER (OUTPATIENT)
Facility: HOSPITAL | Age: 70
Setting detail: HOSPITAL OUTPATIENT SURGERY
Discharge: HOME | End: 2020-06-25
Attending: UROLOGY | Admitting: UROLOGY

## 2020-06-25 VITALS
WEIGHT: 222 LBS | TEMPERATURE: 97.9 F | HEART RATE: 71 BPM | RESPIRATION RATE: 20 BRPM | HEIGHT: 67 IN | BODY MASS INDEX: 34.84 KG/M2 | DIASTOLIC BLOOD PRESSURE: 79 MMHG | OXYGEN SATURATION: 94 % | SYSTOLIC BLOOD PRESSURE: 167 MMHG

## 2020-06-25 DIAGNOSIS — N20.0 CALCULUS OF KIDNEY: ICD-10-CM

## 2020-06-25 PROCEDURE — 25010000002 ONDANSETRON PER 1 MG: Performed by: NURSE ANESTHETIST, CERTIFIED REGISTERED

## 2020-06-25 PROCEDURE — 25010000002 DEXAMETHASONE PER 1 MG: Performed by: NURSE ANESTHETIST, CERTIFIED REGISTERED

## 2020-06-25 PROCEDURE — 25010000002 FENTANYL CITRATE (PF) 100 MCG/2ML SOLUTION: Performed by: NURSE ANESTHETIST, CERTIFIED REGISTERED

## 2020-06-25 PROCEDURE — 25010000002 MIDAZOLAM PER 1 MG: Performed by: NURSE ANESTHETIST, CERTIFIED REGISTERED

## 2020-06-25 PROCEDURE — 25010000002 PROPOFOL 10 MG/ML EMULSION: Performed by: NURSE ANESTHETIST, CERTIFIED REGISTERED

## 2020-06-25 PROCEDURE — 74018 RADEX ABDOMEN 1 VIEW: CPT

## 2020-06-25 PROCEDURE — 25010000002 CEFTRIAXONE: Performed by: UROLOGY

## 2020-06-25 RX ORDER — ONDANSETRON 2 MG/ML
4 INJECTION INTRAMUSCULAR; INTRAVENOUS ONCE AS NEEDED
Status: COMPLETED | OUTPATIENT
Start: 2020-06-25 | End: 2020-06-25

## 2020-06-25 RX ORDER — PROPOFOL 10 MG/ML
VIAL (ML) INTRAVENOUS AS NEEDED
Status: DISCONTINUED | OUTPATIENT
Start: 2020-06-25 | End: 2020-06-25 | Stop reason: SURG

## 2020-06-25 RX ORDER — ALBUTEROL SULFATE 2.5 MG/3ML
2.5 SOLUTION RESPIRATORY (INHALATION) ONCE AS NEEDED
Status: DISCONTINUED | OUTPATIENT
Start: 2020-06-25 | End: 2020-06-25 | Stop reason: HOSPADM

## 2020-06-25 RX ORDER — ONDANSETRON 2 MG/ML
INJECTION INTRAMUSCULAR; INTRAVENOUS AS NEEDED
Status: DISCONTINUED | OUTPATIENT
Start: 2020-06-25 | End: 2020-06-25 | Stop reason: SURG

## 2020-06-25 RX ORDER — MEPERIDINE HYDROCHLORIDE 25 MG/ML
12.5 INJECTION INTRAMUSCULAR; INTRAVENOUS; SUBCUTANEOUS
Status: DISCONTINUED | OUTPATIENT
Start: 2020-06-25 | End: 2020-06-25 | Stop reason: HOSPADM

## 2020-06-25 RX ORDER — FENTANYL CITRATE 50 UG/ML
INJECTION, SOLUTION INTRAMUSCULAR; INTRAVENOUS AS NEEDED
Status: DISCONTINUED | OUTPATIENT
Start: 2020-06-25 | End: 2020-06-25 | Stop reason: SURG

## 2020-06-25 RX ORDER — DEXAMETHASONE SODIUM PHOSPHATE 4 MG/ML
INJECTION, SOLUTION INTRA-ARTICULAR; INTRALESIONAL; INTRAMUSCULAR; INTRAVENOUS; SOFT TISSUE AS NEEDED
Status: DISCONTINUED | OUTPATIENT
Start: 2020-06-25 | End: 2020-06-25 | Stop reason: SURG

## 2020-06-25 RX ORDER — LEVOFLOXACIN 250 MG/1
250 TABLET ORAL DAILY
Qty: 7 TABLET | Refills: 0 | Status: SHIPPED | OUTPATIENT
Start: 2020-06-25 | End: 2020-07-03 | Stop reason: HOSPADM

## 2020-06-25 RX ORDER — SODIUM CHLORIDE, SODIUM GLUCONATE, SODIUM ACETATE, POTASSIUM CHLORIDE, AND MAGNESIUM CHLORIDE 526; 502; 368; 37; 30 MG/100ML; MG/100ML; MG/100ML; MG/100ML; MG/100ML
1000 INJECTION, SOLUTION INTRAVENOUS CONTINUOUS
Status: DISCONTINUED | OUTPATIENT
Start: 2020-06-25 | End: 2020-06-25 | Stop reason: HOSPADM

## 2020-06-25 RX ORDER — OXYCODONE AND ACETAMINOPHEN 7.5; 325 MG/1; MG/1
1-2 TABLET ORAL EVERY 4 HOURS PRN
Qty: 10 TABLET | Refills: 0 | Status: SHIPPED | OUTPATIENT
Start: 2020-06-25 | End: 2020-06-29 | Stop reason: SDUPTHER

## 2020-06-25 RX ORDER — MIDAZOLAM HYDROCHLORIDE 1 MG/ML
INJECTION INTRAMUSCULAR; INTRAVENOUS AS NEEDED
Status: DISCONTINUED | OUTPATIENT
Start: 2020-06-25 | End: 2020-06-25 | Stop reason: SURG

## 2020-06-25 RX ORDER — LIDOCAINE HYDROCHLORIDE 20 MG/ML
INJECTION, SOLUTION INFILTRATION; PERINEURAL AS NEEDED
Status: DISCONTINUED | OUTPATIENT
Start: 2020-06-25 | End: 2020-06-25 | Stop reason: SURG

## 2020-06-25 RX ADMIN — ONDANSETRON 4 MG: 2 INJECTION INTRAMUSCULAR; INTRAVENOUS at 19:54

## 2020-06-25 RX ADMIN — DEXAMETHASONE SODIUM PHOSPHATE 4 MG: 4 INJECTION, SOLUTION INTRAMUSCULAR; INTRAVENOUS at 19:15

## 2020-06-25 RX ADMIN — SODIUM CHLORIDE, SODIUM GLUCONATE, SODIUM ACETATE, POTASSIUM CHLORIDE, AND MAGNESIUM CHLORIDE: 526; 502; 368; 37; 30 INJECTION, SOLUTION INTRAVENOUS at 19:26

## 2020-06-25 RX ADMIN — CEFTRIAXONE 1 G: 1 INJECTION, POWDER, FOR SOLUTION INTRAMUSCULAR; INTRAVENOUS at 19:18

## 2020-06-25 RX ADMIN — ONDANSETRON 4 MG: 2 INJECTION INTRAMUSCULAR; INTRAVENOUS at 19:15

## 2020-06-25 RX ADMIN — PROPOFOL 150 MG: 10 INJECTION, EMULSION INTRAVENOUS at 18:55

## 2020-06-25 RX ADMIN — LIDOCAINE HYDROCHLORIDE 100 MG: 20 INJECTION, SOLUTION INFILTRATION; PERINEURAL at 18:55

## 2020-06-25 RX ADMIN — SODIUM CHLORIDE, SODIUM GLUCONATE, SODIUM ACETATE, POTASSIUM CHLORIDE, AND MAGNESIUM CHLORIDE 1000 ML: 526; 502; 368; 37; 30 INJECTION, SOLUTION INTRAVENOUS at 14:49

## 2020-06-25 RX ADMIN — FENTANYL CITRATE 100 MCG: 50 INJECTION, SOLUTION INTRAMUSCULAR; INTRAVENOUS at 18:55

## 2020-06-25 RX ADMIN — MIDAZOLAM HYDROCHLORIDE 2 MG: 2 INJECTION, SOLUTION INTRAMUSCULAR; INTRAVENOUS at 18:48

## 2020-06-25 NOTE — ANESTHESIA PREPROCEDURE EVALUATION
Anesthesia Evaluation     Patient summary reviewed   NPO Solid Status: > 8 hours  NPO Liquid Status: > 6 hours           Airway   Mallampati: II  TM distance: >3 FB  Neck ROM: full  No difficulty expected  Dental    (+) edentulous    Pulmonary    (+) shortness of breath,   Cardiovascular     NYHA Classification: III  Patient on routine beta blocker and Beta blocker given within 24 hours of surgery    (+) hypertension, hyperlipidemia,       Neuro/Psych  (+) TIA, psychiatric history,     GI/Hepatic/Renal/Endo    (+) obesity, morbid obesity, GERD, GI bleeding , thyroid problem hypothyroidism    Musculoskeletal     (+) back pain,   Abdominal    Substance History      OB/GYN          Other   arthritis,                        Anesthesia Plan    ASA 3     general     intravenous induction     Anesthetic plan, all risks, benefits, and alternatives have been provided, discussed and informed consent has been obtained with: patient.

## 2020-06-26 NOTE — ANESTHESIA PROCEDURE NOTES
Airway  Date/Time: 6/25/2020 6:57 PM    General Information and Staff    Patient location during procedure: OR    Indications and Patient Condition  Indications for airway management: airway protection    Preoxygenated: yes  MILS maintained throughout  Mask difficulty assessment: 0 - not attempted    Final Airway Details  Final airway type: supraglottic airway      Successful airway: I-gel  Size 4    Number of attempts at approach: 1  Assessment: lips, teeth, and gum same as pre-op

## 2020-06-26 NOTE — ANESTHESIA POSTPROCEDURE EVALUATION
Patient: Marielle Hansen    Procedure Summary     Date:  06/25/20 Room / Location:  [facility] OR 02 / [facility] OR    Anesthesia Start:  1851 Anesthesia Stop:  1943    Procedure:  LEFT EXTRACORPOREAL SHOCKWAVE LITHOTRIPSY (Left ) Diagnosis:       Calculus of kidney      (Calculus of kidney [N20.0])    Surgeon:  Johnny Chino MD Provider:  Foreign Weeks MD    Anesthesia Type:  general ASA Status:  3          Anesthesia Type: general    Vitals  No vitals data found for the desired time range.          Post Anesthesia Care and Evaluation    Patient location during evaluation: bedside  Patient participation: waiting for patient participation  Level of consciousness: obtunded/minimal responses  Pain score: 0  Pain management: adequate  Airway patency: patent  Anesthetic complications: No anesthetic complications  PONV Status: none  Cardiovascular status: acceptable  Respiratory status: acceptable  Hydration status: acceptable

## 2020-06-29 ENCOUNTER — HOSPITAL ENCOUNTER (EMERGENCY)
Facility: HOSPITAL | Age: 70
Discharge: HOME | End: 2020-06-29
Attending: FAMILY MEDICINE | Admitting: FAMILY MEDICINE

## 2020-06-29 ENCOUNTER — APPOINTMENT (OUTPATIENT)
Dept: CT IMAGING | Facility: HOSPITAL | Age: 70
End: 2020-06-29

## 2020-06-29 VITALS
BODY MASS INDEX: 35.28 KG/M2 | HEIGHT: 68 IN | TEMPERATURE: 99.1 F | SYSTOLIC BLOOD PRESSURE: 157 MMHG | OXYGEN SATURATION: 97 % | WEIGHT: 232.8 LBS | HEART RATE: 90 BPM | RESPIRATION RATE: 18 BRPM | DIASTOLIC BLOOD PRESSURE: 67 MMHG

## 2020-06-29 DIAGNOSIS — N20.1 LEFT URETERAL STONE: Primary | ICD-10-CM

## 2020-06-29 DIAGNOSIS — N20.0 CALCULUS OF KIDNEY: ICD-10-CM

## 2020-06-29 LAB
ALBUMIN SERPL-MCNC: 4.1 G/DL (ref 3.5–5.2)
ALBUMIN/GLOB SERPL: 1.5 G/DL
ALP SERPL-CCNC: 44 U/L (ref 39–117)
ALT SERPL W P-5'-P-CCNC: 16 U/L (ref 1–33)
ANION GAP SERPL CALCULATED.3IONS-SCNC: 13 MMOL/L (ref 5–15)
AST SERPL-CCNC: 20 U/L (ref 1–32)
BACTERIA UR QL AUTO: ABNORMAL /HPF
BASOPHILS # BLD AUTO: 0.06 10*3/MM3 (ref 0–0.2)
BASOPHILS NFR BLD AUTO: 0.5 % (ref 0–1.5)
BILIRUB SERPL-MCNC: 0.3 MG/DL (ref 0.2–1.2)
BILIRUB UR QL STRIP: NEGATIVE
BUN BLD-MCNC: 15 MG/DL (ref 8–23)
BUN/CREAT SERPL: 10.7 (ref 7–25)
CALCIUM SPEC-SCNC: 8.9 MG/DL (ref 8.6–10.5)
CHLORIDE SERPL-SCNC: 99 MMOL/L (ref 98–107)
CLARITY UR: ABNORMAL
CO2 SERPL-SCNC: 26 MMOL/L (ref 22–29)
COLOR UR: YELLOW
CREAT BLD-MCNC: 1.4 MG/DL (ref 0.57–1)
DEPRECATED RDW RBC AUTO: 42.4 FL (ref 37–54)
EOSINOPHIL # BLD AUTO: 0.03 10*3/MM3 (ref 0–0.4)
EOSINOPHIL NFR BLD AUTO: 0.2 % (ref 0.3–6.2)
ERYTHROCYTE [DISTWIDTH] IN BLOOD BY AUTOMATED COUNT: 14 % (ref 12.3–15.4)
GFR SERPL CREATININE-BSD FRML MDRD: 37 ML/MIN/1.73
GLOBULIN UR ELPH-MCNC: 2.8 GM/DL
GLUCOSE BLD-MCNC: 153 MG/DL (ref 65–99)
GLUCOSE UR STRIP-MCNC: NEGATIVE MG/DL
HCT VFR BLD AUTO: 38.7 % (ref 34–46.6)
HGB BLD-MCNC: 13.1 G/DL (ref 12–15.9)
HGB UR QL STRIP.AUTO: ABNORMAL
HOLD SPECIMEN: NORMAL
HYALINE CASTS UR QL AUTO: ABNORMAL /LPF
IMM GRANULOCYTES # BLD AUTO: 0.15 10*3/MM3 (ref 0–0.05)
IMM GRANULOCYTES NFR BLD AUTO: 1.2 % (ref 0–0.5)
KETONES UR QL STRIP: NEGATIVE
LEUKOCYTE ESTERASE UR QL STRIP.AUTO: ABNORMAL
LIPASE SERPL-CCNC: 37 U/L (ref 13–60)
LYMPHOCYTES # BLD AUTO: 1.54 10*3/MM3 (ref 0.7–3.1)
LYMPHOCYTES NFR BLD AUTO: 11.9 % (ref 19.6–45.3)
MCH RBC QN AUTO: 28.3 PG (ref 26.6–33)
MCHC RBC AUTO-ENTMCNC: 33.9 G/DL (ref 31.5–35.7)
MCV RBC AUTO: 83.6 FL (ref 79–97)
MONOCYTES # BLD AUTO: 0.75 10*3/MM3 (ref 0.1–0.9)
MONOCYTES NFR BLD AUTO: 5.8 % (ref 5–12)
NEUTROPHILS # BLD AUTO: 10.39 10*3/MM3 (ref 1.7–7)
NEUTROPHILS NFR BLD AUTO: 80.4 % (ref 42.7–76)
NITRITE UR QL STRIP: NEGATIVE
NRBC BLD AUTO-RTO: 0 /100 WBC (ref 0–0.2)
PH UR STRIP.AUTO: 6.5 [PH] (ref 5–9)
PLATELET # BLD AUTO: 240 10*3/MM3 (ref 140–450)
PMV BLD AUTO: 9.3 FL (ref 6–12)
POTASSIUM BLD-SCNC: 4.3 MMOL/L (ref 3.5–5.2)
PROT SERPL-MCNC: 6.9 G/DL (ref 6–8.5)
PROT UR QL STRIP: NEGATIVE
RBC # BLD AUTO: 4.63 10*6/MM3 (ref 3.77–5.28)
RBC # UR: ABNORMAL /HPF
REF LAB TEST METHOD: ABNORMAL
SODIUM BLD-SCNC: 138 MMOL/L (ref 136–145)
SP GR UR STRIP: 1.02 (ref 1–1.03)
SQUAMOUS #/AREA URNS HPF: ABNORMAL /HPF
UROBILINOGEN UR QL STRIP: ABNORMAL
WBC NRBC COR # BLD: 12.92 10*3/MM3 (ref 3.4–10.8)
WBC UR QL AUTO: ABNORMAL /HPF
WHOLE BLOOD HOLD SPECIMEN: NORMAL

## 2020-06-29 PROCEDURE — 25010000002 MORPHINE PER 10 MG: Performed by: FAMILY MEDICINE

## 2020-06-29 PROCEDURE — 96374 THER/PROPH/DIAG INJ IV PUSH: CPT

## 2020-06-29 PROCEDURE — 81001 URINALYSIS AUTO W/SCOPE: CPT | Performed by: PHYSICIAN ASSISTANT

## 2020-06-29 PROCEDURE — 80053 COMPREHEN METABOLIC PANEL: CPT | Performed by: PHYSICIAN ASSISTANT

## 2020-06-29 PROCEDURE — 25010000002 PROMETHAZINE PER 50 MG: Performed by: PHYSICIAN ASSISTANT

## 2020-06-29 PROCEDURE — 99284 EMERGENCY DEPT VISIT MOD MDM: CPT

## 2020-06-29 PROCEDURE — 85025 COMPLETE CBC W/AUTO DIFF WBC: CPT | Performed by: PHYSICIAN ASSISTANT

## 2020-06-29 PROCEDURE — 96375 TX/PRO/DX INJ NEW DRUG ADDON: CPT

## 2020-06-29 PROCEDURE — 74176 CT ABD & PELVIS W/O CONTRAST: CPT

## 2020-06-29 PROCEDURE — 83690 ASSAY OF LIPASE: CPT | Performed by: PHYSICIAN ASSISTANT

## 2020-06-29 RX ORDER — PROMETHAZINE HYDROCHLORIDE 12.5 MG/1
12.5 TABLET ORAL EVERY 6 HOURS PRN
Qty: 20 TABLET | Refills: 0 | Status: ON HOLD | OUTPATIENT
Start: 2020-06-29 | End: 2020-07-03 | Stop reason: SDUPTHER

## 2020-06-29 RX ORDER — SODIUM CHLORIDE 0.9 % (FLUSH) 0.9 %
10 SYRINGE (ML) INJECTION AS NEEDED
Status: DISCONTINUED | OUTPATIENT
Start: 2020-06-29 | End: 2020-06-29 | Stop reason: HOSPADM

## 2020-06-29 RX ORDER — OXYCODONE AND ACETAMINOPHEN 7.5; 325 MG/1; MG/1
1-2 TABLET ORAL EVERY 4 HOURS PRN
Qty: 10 TABLET | Refills: 0 | Status: ON HOLD | OUTPATIENT
Start: 2020-06-29 | End: 2020-07-03

## 2020-06-29 RX ORDER — PROMETHAZINE HYDROCHLORIDE 25 MG/ML
12.5 INJECTION, SOLUTION INTRAMUSCULAR; INTRAVENOUS ONCE
Status: COMPLETED | OUTPATIENT
Start: 2020-06-29 | End: 2020-06-29

## 2020-06-29 RX ADMIN — PROMETHAZINE HYDROCHLORIDE 12.5 MG: 25 INJECTION INTRAMUSCULAR; INTRAVENOUS at 11:43

## 2020-06-29 RX ADMIN — MORPHINE SULFATE 4 MG: 4 INJECTION INTRAVENOUS at 12:17

## 2020-07-01 ENCOUNTER — HOSPITAL ENCOUNTER (OUTPATIENT)
Facility: HOSPITAL | Age: 70
Discharge: HOME | End: 2020-07-03
Attending: EMERGENCY MEDICINE | Admitting: INTERNAL MEDICINE

## 2020-07-01 DIAGNOSIS — N20.0 CALCULUS OF KIDNEY: ICD-10-CM

## 2020-07-01 DIAGNOSIS — N17.9 AKI (ACUTE KIDNEY INJURY) (HCC): ICD-10-CM

## 2020-07-01 DIAGNOSIS — N20.1 URETEROLITHIASIS: ICD-10-CM

## 2020-07-01 DIAGNOSIS — N30.01 ACUTE CYSTITIS WITH HEMATURIA: ICD-10-CM

## 2020-07-01 DIAGNOSIS — N13.2 URETERAL STONE WITH HYDRONEPHROSIS: Primary | ICD-10-CM

## 2020-07-01 LAB
ALBUMIN SERPL-MCNC: 4 G/DL (ref 3.5–5.2)
ALBUMIN/GLOB SERPL: 1.3 G/DL
ALP SERPL-CCNC: 40 U/L (ref 39–117)
ALT SERPL W P-5'-P-CCNC: 16 U/L (ref 1–33)
ANION GAP SERPL CALCULATED.3IONS-SCNC: 15 MMOL/L (ref 5–15)
AST SERPL-CCNC: 23 U/L (ref 1–32)
BACTERIA UR QL AUTO: ABNORMAL /HPF
BASOPHILS # BLD AUTO: 0.05 10*3/MM3 (ref 0–0.2)
BASOPHILS NFR BLD AUTO: 0.4 % (ref 0–1.5)
BILIRUB SERPL-MCNC: 0.4 MG/DL (ref 0.2–1.2)
BILIRUB UR QL STRIP: ABNORMAL
BUN SERPL-MCNC: 32 MG/DL (ref 8–23)
BUN/CREAT SERPL: 15.2 (ref 7–25)
CALCIUM SPEC-SCNC: 9 MG/DL (ref 8.6–10.5)
CHLORIDE SERPL-SCNC: 96 MMOL/L (ref 98–107)
CLARITY UR: ABNORMAL
CO2 SERPL-SCNC: 22 MMOL/L (ref 22–29)
COLOR UR: ABNORMAL
CREAT SERPL-MCNC: 2.1 MG/DL (ref 0.57–1)
DEPRECATED RDW RBC AUTO: 44.6 FL (ref 37–54)
EOSINOPHIL # BLD AUTO: 0.13 10*3/MM3 (ref 0–0.4)
EOSINOPHIL NFR BLD AUTO: 1.1 % (ref 0.3–6.2)
ERYTHROCYTE [DISTWIDTH] IN BLOOD BY AUTOMATED COUNT: 14.5 % (ref 12.3–15.4)
GFR SERPL CREATININE-BSD FRML MDRD: 23 ML/MIN/1.73
GLOBULIN UR ELPH-MCNC: 3.2 GM/DL
GLUCOSE SERPL-MCNC: 136 MG/DL (ref 65–99)
GLUCOSE UR STRIP-MCNC: NEGATIVE MG/DL
HCT VFR BLD AUTO: 36.7 % (ref 34–46.6)
HGB BLD-MCNC: 12.5 G/DL (ref 12–15.9)
HGB UR QL STRIP.AUTO: NEGATIVE
HOLD SPECIMEN: NORMAL
HYALINE CASTS UR QL AUTO: ABNORMAL /LPF
IMM GRANULOCYTES # BLD AUTO: 0.13 10*3/MM3 (ref 0–0.05)
IMM GRANULOCYTES NFR BLD AUTO: 1.1 % (ref 0–0.5)
KETONES UR QL STRIP: ABNORMAL
LEUKOCYTE ESTERASE UR QL STRIP.AUTO: ABNORMAL
LYMPHOCYTES # BLD AUTO: 1.59 10*3/MM3 (ref 0.7–3.1)
LYMPHOCYTES NFR BLD AUTO: 13.3 % (ref 19.6–45.3)
MCH RBC QN AUTO: 28.8 PG (ref 26.6–33)
MCHC RBC AUTO-ENTMCNC: 34.1 G/DL (ref 31.5–35.7)
MCV RBC AUTO: 84.6 FL (ref 79–97)
MONOCYTES # BLD AUTO: 1.68 10*3/MM3 (ref 0.1–0.9)
MONOCYTES NFR BLD AUTO: 14 % (ref 5–12)
NEUTROPHILS NFR BLD AUTO: 70.1 % (ref 42.7–76)
NEUTROPHILS NFR BLD AUTO: 8.39 10*3/MM3 (ref 1.7–7)
NITRITE UR QL STRIP: NEGATIVE
NRBC BLD AUTO-RTO: 0 /100 WBC (ref 0–0.2)
PH UR STRIP.AUTO: <=5 [PH] (ref 5–9)
PLATELET # BLD AUTO: 182 10*3/MM3 (ref 140–450)
PMV BLD AUTO: 9.5 FL (ref 6–12)
POTASSIUM SERPL-SCNC: 4.2 MMOL/L (ref 3.5–5.2)
PROT SERPL-MCNC: 7.2 G/DL (ref 6–8.5)
PROT UR QL STRIP: ABNORMAL
RBC # BLD AUTO: 4.34 10*6/MM3 (ref 3.77–5.28)
RBC # UR: ABNORMAL /HPF
REF LAB TEST METHOD: ABNORMAL
SODIUM SERPL-SCNC: 133 MMOL/L (ref 136–145)
SP GR UR STRIP: 1.04 (ref 1–1.03)
SQUAMOUS #/AREA URNS HPF: ABNORMAL /HPF
UROBILINOGEN UR QL STRIP: ABNORMAL
WBC # BLD AUTO: 11.97 10*3/MM3 (ref 3.4–10.8)
WBC UR QL AUTO: ABNORMAL /HPF
WHOLE BLOOD HOLD SPECIMEN: NORMAL

## 2020-07-01 PROCEDURE — 85025 COMPLETE CBC W/AUTO DIFF WBC: CPT | Performed by: EMERGENCY MEDICINE

## 2020-07-01 PROCEDURE — 25010000002 HYDROMORPHONE 1 MG/ML SOLUTION: Performed by: EMERGENCY MEDICINE

## 2020-07-01 PROCEDURE — 81001 URINALYSIS AUTO W/SCOPE: CPT | Performed by: EMERGENCY MEDICINE

## 2020-07-01 PROCEDURE — 96365 THER/PROPH/DIAG IV INF INIT: CPT

## 2020-07-01 PROCEDURE — G0378 HOSPITAL OBSERVATION PER HR: HCPCS

## 2020-07-01 PROCEDURE — 25010000002 CEFTRIAXONE PER 250 MG: Performed by: EMERGENCY MEDICINE

## 2020-07-01 PROCEDURE — 99284 EMERGENCY DEPT VISIT MOD MDM: CPT

## 2020-07-01 PROCEDURE — 25010000002 ONDANSETRON PER 1 MG: Performed by: EMERGENCY MEDICINE

## 2020-07-01 PROCEDURE — 25010000002 MORPHINE PER 10 MG: Performed by: EMERGENCY MEDICINE

## 2020-07-01 PROCEDURE — 80053 COMPREHEN METABOLIC PANEL: CPT | Performed by: EMERGENCY MEDICINE

## 2020-07-01 PROCEDURE — 87086 URINE CULTURE/COLONY COUNT: CPT | Performed by: EMERGENCY MEDICINE

## 2020-07-01 PROCEDURE — 25010000002 PROMETHAZINE PER 50 MG: Performed by: EMERGENCY MEDICINE

## 2020-07-01 PROCEDURE — 96375 TX/PRO/DX INJ NEW DRUG ADDON: CPT

## 2020-07-01 RX ORDER — PROMETHAZINE HYDROCHLORIDE 25 MG/ML
6.25 INJECTION, SOLUTION INTRAMUSCULAR; INTRAVENOUS ONCE
Status: COMPLETED | OUTPATIENT
Start: 2020-07-01 | End: 2020-07-01

## 2020-07-01 RX ORDER — SODIUM CHLORIDE 0.9 % (FLUSH) 0.9 %
10 SYRINGE (ML) INJECTION AS NEEDED
Status: DISCONTINUED | OUTPATIENT
Start: 2020-07-01 | End: 2020-07-03 | Stop reason: HOSPADM

## 2020-07-01 RX ORDER — ONDANSETRON 2 MG/ML
4 INJECTION INTRAMUSCULAR; INTRAVENOUS ONCE
Status: COMPLETED | OUTPATIENT
Start: 2020-07-01 | End: 2020-07-01

## 2020-07-01 RX ADMIN — SODIUM CHLORIDE 500 ML: 9 INJECTION, SOLUTION INTRAVENOUS at 20:25

## 2020-07-01 RX ADMIN — MORPHINE SULFATE 4 MG: 4 INJECTION INTRAVENOUS at 20:25

## 2020-07-01 RX ADMIN — PROMETHAZINE HYDROCHLORIDE 6.25 MG: 25 INJECTION INTRAMUSCULAR; INTRAVENOUS at 21:09

## 2020-07-01 RX ADMIN — HYDROMORPHONE HYDROCHLORIDE 0.5 MG: 1 INJECTION, SOLUTION INTRAMUSCULAR; INTRAVENOUS; SUBCUTANEOUS at 21:56

## 2020-07-01 RX ADMIN — CEFTRIAXONE SODIUM 1 G: 1 INJECTION, POWDER, FOR SOLUTION INTRAMUSCULAR; INTRAVENOUS at 21:09

## 2020-07-01 RX ADMIN — ONDANSETRON 4 MG: 2 SOLUTION INTRAMUSCULAR; INTRAVENOUS at 20:25

## 2020-07-02 ENCOUNTER — ANESTHESIA EVENT (OUTPATIENT)
Dept: PERIOP | Facility: HOSPITAL | Age: 70
End: 2020-07-02

## 2020-07-02 ENCOUNTER — APPOINTMENT (OUTPATIENT)
Dept: GENERAL RADIOLOGY | Facility: HOSPITAL | Age: 70
End: 2020-07-02

## 2020-07-02 ENCOUNTER — ANESTHESIA (OUTPATIENT)
Dept: PERIOP | Facility: HOSPITAL | Age: 70
End: 2020-07-02

## 2020-07-02 PROBLEM — N13.2 URETERAL STONE WITH HYDRONEPHROSIS: Status: ACTIVE | Noted: 2020-07-01

## 2020-07-02 LAB
ALBUMIN SERPL-MCNC: 3.6 G/DL (ref 3.5–5.2)
ALBUMIN/GLOB SERPL: 1.2 G/DL
ALP SERPL-CCNC: 36 U/L (ref 39–117)
ALT SERPL W P-5'-P-CCNC: 17 U/L (ref 1–33)
ANION GAP SERPL CALCULATED.3IONS-SCNC: 11 MMOL/L (ref 5–15)
AST SERPL-CCNC: 28 U/L (ref 1–32)
BASOPHILS # BLD AUTO: 0.04 10*3/MM3 (ref 0–0.2)
BASOPHILS NFR BLD AUTO: 0.5 % (ref 0–1.5)
BILIRUB SERPL-MCNC: 0.2 MG/DL (ref 0.2–1.2)
BUN SERPL-MCNC: 33 MG/DL (ref 8–23)
BUN/CREAT SERPL: 16.8 (ref 7–25)
CALCIUM SPEC-SCNC: 8.7 MG/DL (ref 8.6–10.5)
CHLORIDE SERPL-SCNC: 98 MMOL/L (ref 98–107)
CO2 SERPL-SCNC: 26 MMOL/L (ref 22–29)
CREAT SERPL-MCNC: 1.96 MG/DL (ref 0.57–1)
DEPRECATED RDW RBC AUTO: 44.8 FL (ref 37–54)
EOSINOPHIL # BLD AUTO: 0.25 10*3/MM3 (ref 0–0.4)
EOSINOPHIL NFR BLD AUTO: 3.3 % (ref 0.3–6.2)
ERYTHROCYTE [DISTWIDTH] IN BLOOD BY AUTOMATED COUNT: 14.4 % (ref 12.3–15.4)
GFR SERPL CREATININE-BSD FRML MDRD: 25 ML/MIN/1.73
GLOBULIN UR ELPH-MCNC: 3.1 GM/DL
GLUCOSE SERPL-MCNC: 91 MG/DL (ref 65–99)
HCT VFR BLD AUTO: 34.9 % (ref 34–46.6)
HGB BLD-MCNC: 11.5 G/DL (ref 12–15.9)
IMM GRANULOCYTES # BLD AUTO: 0.06 10*3/MM3 (ref 0–0.05)
IMM GRANULOCYTES NFR BLD AUTO: 0.8 % (ref 0–0.5)
LYMPHOCYTES # BLD AUTO: 1.57 10*3/MM3 (ref 0.7–3.1)
LYMPHOCYTES NFR BLD AUTO: 20.9 % (ref 19.6–45.3)
MCH RBC QN AUTO: 28.3 PG (ref 26.6–33)
MCHC RBC AUTO-ENTMCNC: 33 G/DL (ref 31.5–35.7)
MCV RBC AUTO: 86 FL (ref 79–97)
MONOCYTES # BLD AUTO: 1.22 10*3/MM3 (ref 0.1–0.9)
MONOCYTES NFR BLD AUTO: 16.2 % (ref 5–12)
NEUTROPHILS NFR BLD AUTO: 4.38 10*3/MM3 (ref 1.7–7)
NEUTROPHILS NFR BLD AUTO: 58.3 % (ref 42.7–76)
NRBC BLD AUTO-RTO: 0 /100 WBC (ref 0–0.2)
PLATELET # BLD AUTO: 160 10*3/MM3 (ref 140–450)
PMV BLD AUTO: 9.9 FL (ref 6–12)
POTASSIUM SERPL-SCNC: 3.8 MMOL/L (ref 3.5–5.2)
PROT SERPL-MCNC: 6.7 G/DL (ref 6–8.5)
RBC # BLD AUTO: 4.06 10*6/MM3 (ref 3.77–5.28)
SARS-COV-2 N GENE RESP QL NAA+PROBE: NOT DETECTED
SODIUM SERPL-SCNC: 135 MMOL/L (ref 136–145)
WBC # BLD AUTO: 7.52 10*3/MM3 (ref 3.4–10.8)

## 2020-07-02 PROCEDURE — 25010000002 ONDANSETRON PER 1 MG: Performed by: INTERNAL MEDICINE

## 2020-07-02 PROCEDURE — G0378 HOSPITAL OBSERVATION PER HR: HCPCS

## 2020-07-02 PROCEDURE — C9803 HOPD COVID-19 SPEC COLLECT: HCPCS

## 2020-07-02 PROCEDURE — 85025 COMPLETE CBC W/AUTO DIFF WBC: CPT | Performed by: NURSE PRACTITIONER

## 2020-07-02 PROCEDURE — 74420 UROGRAPHY RTRGR +-KUB: CPT

## 2020-07-02 PROCEDURE — 87635 SARS-COV-2 COVID-19 AMP PRB: CPT | Performed by: INTERNAL MEDICINE

## 2020-07-02 PROCEDURE — 25010000002 ONDANSETRON PER 1 MG: Performed by: NURSE ANESTHETIST, CERTIFIED REGISTERED

## 2020-07-02 PROCEDURE — 25010000002 PROPOFOL 10 MG/ML EMULSION: Performed by: NURSE ANESTHETIST, CERTIFIED REGISTERED

## 2020-07-02 PROCEDURE — 25010000002 CEFTRIAXONE PER 250 MG: Performed by: NURSE PRACTITIONER

## 2020-07-02 PROCEDURE — 25010000002 FENTANYL CITRATE (PF) 100 MCG/2ML SOLUTION: Performed by: NURSE ANESTHETIST, CERTIFIED REGISTERED

## 2020-07-02 PROCEDURE — 25010000002 IOPAMIDOL 61 % SOLUTION: Performed by: UROLOGY

## 2020-07-02 PROCEDURE — 25010000002 HYDROMORPHONE 1 MG/ML SOLUTION: Performed by: UROLOGY

## 2020-07-02 PROCEDURE — 82365 CALCULUS SPECTROSCOPY: CPT | Performed by: UROLOGY

## 2020-07-02 PROCEDURE — C1758 CATHETER, URETERAL: HCPCS | Performed by: UROLOGY

## 2020-07-02 PROCEDURE — 80053 COMPREHEN METABOLIC PANEL: CPT | Performed by: NURSE PRACTITIONER

## 2020-07-02 PROCEDURE — C1769 GUIDE WIRE: HCPCS | Performed by: UROLOGY

## 2020-07-02 PROCEDURE — 25010000002 HYDROMORPHONE 1 MG/ML SOLUTION: Performed by: INTERNAL MEDICINE

## 2020-07-02 PROCEDURE — C2617 STENT, NON-COR, TEM W/O DEL: HCPCS | Performed by: UROLOGY

## 2020-07-02 PROCEDURE — 96376 TX/PRO/DX INJ SAME DRUG ADON: CPT

## 2020-07-02 PROCEDURE — 25010000002 DEXAMETHASONE PER 1 MG: Performed by: NURSE ANESTHETIST, CERTIFIED REGISTERED

## 2020-07-02 DEVICE — URETERAL STENT
Type: IMPLANTABLE DEVICE | Site: URETER | Status: NON-FUNCTIONAL
Brand: PERCUFLEX™ PLUS

## 2020-07-02 RX ORDER — SODIUM CHLORIDE 0.9 % (FLUSH) 0.9 %
10 SYRINGE (ML) INJECTION EVERY 12 HOURS SCHEDULED
Status: DISCONTINUED | OUTPATIENT
Start: 2020-07-02 | End: 2020-07-03 | Stop reason: HOSPADM

## 2020-07-02 RX ORDER — OXYBUTYNIN CHLORIDE 10 MG/1
10 TABLET, EXTENDED RELEASE ORAL DAILY
Status: DISCONTINUED | OUTPATIENT
Start: 2020-07-02 | End: 2020-07-03 | Stop reason: HOSPADM

## 2020-07-02 RX ORDER — MAGNESIUM HYDROXIDE 1200 MG/15ML
LIQUID ORAL AS NEEDED
Status: DISCONTINUED | OUTPATIENT
Start: 2020-07-02 | End: 2020-07-02 | Stop reason: HOSPADM

## 2020-07-02 RX ORDER — METOPROLOL SUCCINATE 50 MG/1
100 TABLET, EXTENDED RELEASE ORAL DAILY
Status: DISCONTINUED | OUTPATIENT
Start: 2020-07-02 | End: 2020-07-03 | Stop reason: HOSPADM

## 2020-07-02 RX ORDER — ONDANSETRON 2 MG/ML
4 INJECTION INTRAMUSCULAR; INTRAVENOUS EVERY 6 HOURS PRN
Status: DISCONTINUED | OUTPATIENT
Start: 2020-07-02 | End: 2020-07-03 | Stop reason: HOSPADM

## 2020-07-02 RX ORDER — LIDOCAINE HYDROCHLORIDE 20 MG/ML
INJECTION, SOLUTION INFILTRATION; PERINEURAL AS NEEDED
Status: DISCONTINUED | OUTPATIENT
Start: 2020-07-02 | End: 2020-07-02 | Stop reason: SURG

## 2020-07-02 RX ORDER — SODIUM CHLORIDE 9 MG/ML
50 INJECTION, SOLUTION INTRAVENOUS CONTINUOUS
Status: DISCONTINUED | OUTPATIENT
Start: 2020-07-02 | End: 2020-07-03 | Stop reason: HOSPADM

## 2020-07-02 RX ORDER — ESCITALOPRAM OXALATE 10 MG/1
10 TABLET ORAL DAILY
Status: DISCONTINUED | OUTPATIENT
Start: 2020-07-02 | End: 2020-07-03 | Stop reason: HOSPADM

## 2020-07-02 RX ORDER — ONDANSETRON 4 MG/1
4 TABLET, FILM COATED ORAL EVERY 6 HOURS PRN
Status: DISCONTINUED | OUTPATIENT
Start: 2020-07-02 | End: 2020-07-03 | Stop reason: HOSPADM

## 2020-07-02 RX ORDER — FENTANYL CITRATE 50 UG/ML
INJECTION, SOLUTION INTRAMUSCULAR; INTRAVENOUS AS NEEDED
Status: DISCONTINUED | OUTPATIENT
Start: 2020-07-02 | End: 2020-07-02 | Stop reason: SURG

## 2020-07-02 RX ORDER — NALOXONE HCL 0.4 MG/ML
0.4 VIAL (ML) INJECTION
Status: DISCONTINUED | OUTPATIENT
Start: 2020-07-02 | End: 2020-07-03 | Stop reason: HOSPADM

## 2020-07-02 RX ORDER — SODIUM CHLORIDE 0.9 % (FLUSH) 0.9 %
10 SYRINGE (ML) INJECTION AS NEEDED
Status: DISCONTINUED | OUTPATIENT
Start: 2020-07-02 | End: 2020-07-03 | Stop reason: HOSPADM

## 2020-07-02 RX ORDER — MORPHINE SULFATE 2 MG/ML
1 INJECTION, SOLUTION INTRAMUSCULAR; INTRAVENOUS EVERY 4 HOURS PRN
Status: DISCONTINUED | OUTPATIENT
Start: 2020-07-02 | End: 2020-07-03 | Stop reason: HOSPADM

## 2020-07-02 RX ORDER — DEXTROSE AND SODIUM CHLORIDE 5; .45 G/100ML; G/100ML
100 INJECTION, SOLUTION INTRAVENOUS CONTINUOUS
Status: DISCONTINUED | OUTPATIENT
Start: 2020-07-02 | End: 2020-07-03 | Stop reason: HOSPADM

## 2020-07-02 RX ORDER — AMLODIPINE BESYLATE 5 MG/1
5 TABLET ORAL DAILY
Status: DISCONTINUED | OUTPATIENT
Start: 2020-07-02 | End: 2020-07-03 | Stop reason: HOSPADM

## 2020-07-02 RX ORDER — DEXAMETHASONE SODIUM PHOSPHATE 4 MG/ML
INJECTION, SOLUTION INTRA-ARTICULAR; INTRALESIONAL; INTRAMUSCULAR; INTRAVENOUS; SOFT TISSUE AS NEEDED
Status: DISCONTINUED | OUTPATIENT
Start: 2020-07-02 | End: 2020-07-02 | Stop reason: SURG

## 2020-07-02 RX ORDER — PROPOFOL 10 MG/ML
VIAL (ML) INTRAVENOUS AS NEEDED
Status: DISCONTINUED | OUTPATIENT
Start: 2020-07-02 | End: 2020-07-02 | Stop reason: SURG

## 2020-07-02 RX ORDER — ONDANSETRON 2 MG/ML
4 INJECTION INTRAMUSCULAR; INTRAVENOUS ONCE AS NEEDED
Status: DISCONTINUED | OUTPATIENT
Start: 2020-07-02 | End: 2020-07-02 | Stop reason: HOSPADM

## 2020-07-02 RX ORDER — ONDANSETRON 2 MG/ML
4 INJECTION INTRAMUSCULAR; INTRAVENOUS EVERY 6 HOURS PRN
Status: DISCONTINUED | OUTPATIENT
Start: 2020-07-02 | End: 2020-07-02 | Stop reason: SDUPTHER

## 2020-07-02 RX ORDER — ATORVASTATIN CALCIUM 10 MG/1
10 TABLET, FILM COATED ORAL DAILY
Status: DISCONTINUED | OUTPATIENT
Start: 2020-07-02 | End: 2020-07-03 | Stop reason: HOSPADM

## 2020-07-02 RX ORDER — ALBUTEROL SULFATE 2.5 MG/3ML
2.5 SOLUTION RESPIRATORY (INHALATION) EVERY 4 HOURS PRN
Status: DISCONTINUED | OUTPATIENT
Start: 2020-07-02 | End: 2020-07-03 | Stop reason: HOSPADM

## 2020-07-02 RX ORDER — ONDANSETRON 2 MG/ML
INJECTION INTRAMUSCULAR; INTRAVENOUS AS NEEDED
Status: DISCONTINUED | OUTPATIENT
Start: 2020-07-02 | End: 2020-07-02 | Stop reason: SURG

## 2020-07-02 RX ADMIN — SODIUM CHLORIDE, PRESERVATIVE FREE 10 ML: 5 INJECTION INTRAVENOUS at 08:28

## 2020-07-02 RX ADMIN — AMLODIPINE BESYLATE 5 MG: 5 TABLET ORAL at 08:28

## 2020-07-02 RX ADMIN — FENTANYL CITRATE 50 MCG: 50 INJECTION, SOLUTION INTRAMUSCULAR; INTRAVENOUS at 20:50

## 2020-07-02 RX ADMIN — DEXAMETHASONE SODIUM PHOSPHATE 4 MG: 4 INJECTION, SOLUTION INTRAMUSCULAR; INTRAVENOUS at 20:47

## 2020-07-02 RX ADMIN — CEFTRIAXONE SODIUM 1 G: 1 INJECTION, POWDER, FOR SOLUTION INTRAMUSCULAR; INTRAVENOUS at 17:10

## 2020-07-02 RX ADMIN — ONDANSETRON 4 MG: 2 SOLUTION INTRAMUSCULAR; INTRAVENOUS at 04:49

## 2020-07-02 RX ADMIN — ATORVASTATIN CALCIUM 10 MG: 10 TABLET, FILM COATED ORAL at 08:28

## 2020-07-02 RX ADMIN — LIDOCAINE HYDROCHLORIDE 50 MG: 20 INJECTION, SOLUTION INFILTRATION; PERINEURAL at 20:47

## 2020-07-02 RX ADMIN — HYDROMORPHONE HYDROCHLORIDE 0.5 MG: 1 INJECTION, SOLUTION INTRAMUSCULAR; INTRAVENOUS; SUBCUTANEOUS at 14:05

## 2020-07-02 RX ADMIN — OXYBUTYNIN CHLORIDE 10 MG: 10 TABLET, EXTENDED RELEASE ORAL at 08:28

## 2020-07-02 RX ADMIN — HYDROMORPHONE HYDROCHLORIDE 0.5 MG: 1 INJECTION, SOLUTION INTRAMUSCULAR; INTRAVENOUS; SUBCUTANEOUS at 22:58

## 2020-07-02 RX ADMIN — SODIUM CHLORIDE 50 ML/HR: 900 INJECTION, SOLUTION INTRAVENOUS at 18:08

## 2020-07-02 RX ADMIN — ONDANSETRON 4 MG: 2 SOLUTION INTRAMUSCULAR; INTRAVENOUS at 12:11

## 2020-07-02 RX ADMIN — DEXTROSE AND SODIUM CHLORIDE 100 ML/HR: 5; 450 INJECTION, SOLUTION INTRAVENOUS at 23:03

## 2020-07-02 RX ADMIN — ESCITALOPRAM OXALATE 10 MG: 10 TABLET ORAL at 08:28

## 2020-07-02 RX ADMIN — ONDANSETRON 4 MG: 2 INJECTION INTRAMUSCULAR; INTRAVENOUS at 21:03

## 2020-07-02 RX ADMIN — METOPROLOL SUCCINATE 100 MG: 50 TABLET, EXTENDED RELEASE ORAL at 08:28

## 2020-07-02 RX ADMIN — PROPOFOL 140 MG: 10 INJECTION, EMULSION INTRAVENOUS at 20:47

## 2020-07-02 RX ADMIN — HYDROMORPHONE HYDROCHLORIDE 0.5 MG: 1 INJECTION, SOLUTION INTRAMUSCULAR; INTRAVENOUS; SUBCUTANEOUS at 07:11

## 2020-07-02 RX ADMIN — LEVOTHYROXINE SODIUM 137 MCG: 25 TABLET ORAL at 06:34

## 2020-07-02 RX ADMIN — FENTANYL CITRATE 50 MCG: 50 INJECTION, SOLUTION INTRAMUSCULAR; INTRAVENOUS at 20:53

## 2020-07-02 NOTE — PROGRESS NOTES
[facility] Medicine Services  INPATIENT PROGRESS NOTE    Length of Stay: 0  Date of Admission: 7/1/2020  Primary Care Physician: Yoselin Rubio MD    Subjective   Chief Complaint: Flank pain, nausea and vomiting.      HPI: This is a 70-year-old  female with past medical history of CVA (residual right face lip and eyelid droop), nephrolithiasis, depression and hypothyroidism that presented to [facility] on 7/1/2020 with complaints of flank pain, nausea and vomiting.    The patient recently underwent lithotripsy on 6/25/2020 by Dr. Chino for a 1 cm left renal stone.  Patient states about 3 days ago her left flank pain worsened and was not relieved by Percocet or Toradol.      CT of the abdomen and pelvis indicates left kidney mild hydronephrosis secondary to proximal left ureter 4 separate ureter calculi, with the largest measuring up to 3.9 mm in greatest width.    Dr. Chino was consulted and the patient is to undergo cystoscopy with stent placement this afternoon.    Review of Systems   Constitutional: Negative for activity change and fatigue.   HENT: Negative for ear pain and sore throat.    Eyes: Negative for pain and discharge.   Respiratory: Negative for cough and shortness of breath.    Cardiovascular: Negative for chest pain and palpitations.   Gastrointestinal: Positive for nausea. Negative for abdominal pain.   Endocrine: Negative for cold intolerance and heat intolerance.   Genitourinary: Positive for flank pain. Negative for difficulty urinating and dysuria.   Musculoskeletal: Negative for arthralgias and gait problem.   Skin: Negative for color change and rash.   Neurological: Negative for dizziness and weakness.   Psychiatric/Behavioral: Negative for agitation and confusion.        Objective    Temp:  [96.1 °F (35.6 °C)-98.7 °F (37.1 °C)] 98.7 °F (37.1 °C)  Heart Rate:  [64-82] 64  Resp:  [16-22] 18  BP: (101-141)/(51-94) 127/60    Physical  Exam   Constitutional: She is oriented to person, place, and time. She appears well-developed and well-nourished.   HENT:   Head: Normocephalic and atraumatic.   Eyes: Pupils are equal, round, and reactive to light. EOM are normal.   Neck: Normal range of motion. Neck supple.   Cardiovascular: Normal rate and regular rhythm.   Pulmonary/Chest: Effort normal and breath sounds normal.   Abdominal: Soft. Bowel sounds are normal.   Musculoskeletal: Normal range of motion.   Neurological: She is alert and oriented to person, place, and time.   Skin: Skin is warm and dry.   Psychiatric: She has a normal mood and affect. Her behavior is normal.     Results Review:  I have reviewed the labs, radiology results, and diagnostic studies.    Laboratory Data:   Results from last 7 days   Lab Units 07/02/20  1054 07/01/20 2022 06/29/20  1131   SODIUM mmol/L 135* 133* 138   POTASSIUM mmol/L 3.8 4.2 4.3   CHLORIDE mmol/L 98 96* 99   CO2 mmol/L 26.0 22.0 26.0   BUN mg/dL 33* 32* 15   CREATININE mg/dL 1.96* 2.10* 1.40*   GLUCOSE mg/dL 91 136* 153*   CALCIUM mg/dL 8.7 9.0 8.9   BILIRUBIN mg/dL 0.2 0.4 0.3   ALK PHOS U/L 36* 40 44   ALT (SGPT) U/L 17 16 16   AST (SGOT) U/L 28 23 20   ANION GAP mmol/L 11.0 15.0 13.0     Estimated Creatinine Clearance: 33 mL/min (A) (by C-G formula based on SCr of 1.96 mg/dL (H)).          Results from last 7 days   Lab Units 07/02/20 1054 07/01/20 2022 06/29/20  1131   WBC 10*3/mm3 7.52 11.97* 12.92*   HEMOGLOBIN g/dL 11.5* 12.5 13.1   HEMATOCRIT % 34.9 36.7 38.7   PLATELETS 10*3/mm3 160 182 240           Culture Data:   No results found for: BLOODCX  No results found for: URINECX  No results found for: RESPCX  No results found for: WOUNDCX  No results found for: STOOLCX  No components found for: BODYFLD    Radiology Data:   Imaging Results (Last 24 Hours)     ** No results found for the last 24 hours. **          I have reviewed the patient's current medications.     Assessment/Plan     Active  Hospital Problems    Diagnosis POA   • **Ureteral stone with hydronephrosis [N13.2] Unknown     Added automatically from request for surgery [id number]     • CARMEN (acute kidney injury) (CMS/[facility]) [N17.9] Yes       Plan:    1.  Nephrolithiasis: Urology consult appreciated.  Patient is to undergo cystoscopy with stent placement this afternoon.  2.  Acute kidney injury: Avoid NSAIDs.  Continue hydration.  ARB and thiazide are held.  3.  Hypertension, controlled: Continue amlodipine and metoprolol.  Benicar held secondary to CARMEN.  4.  Hypothyroidism: Continue home Synthroid.  5.  Hyperlipidemia: Continue home statin.  6.  Depression: Continue home Lexapro.  7.  Urinary tract infection: Urine culture pending.  Continue IV Rocephin.        Discharge Planning: I expect patient to be discharged to home in 1-2 days.      This document has been electronically signed by BIBI Cody on July 2, 2020 15:40

## 2020-07-02 NOTE — ANESTHESIA PREPROCEDURE EVALUATION
Anesthesia Evaluation     Patient summary reviewed and Nursing notes reviewed   no history of anesthetic complications:  NPO Solid Status: > 8 hours  NPO Liquid Status: > 6 hours           Airway   Mallampati: II  TM distance: >3 FB  Neck ROM: full  No difficulty expected  Comment: 6/25/20 General anesthesia. LMA #4.  Dental    (+) edentulous    Pulmonary     breath sounds clear to auscultation  (+) a smoker Former, decreased breath sounds,     ROS comment: FINDINGS: The lungs are well-expanded and clear. There is  evidence of prior healed granulomatous infection. The cardiac  silhouette and pulmonary vasculature are within normal limits.  There are no pleural effusions.     IMPRESSION:  1. No radiographic evidence of acute cardiopulmonary disease.     Electronically signed by:  Kellie Whalen MD  1/17/2020 11:27 AM  Cardiovascular     NYHA Classification: III  ECG reviewed  Patient on routine beta blocker and Beta blocker given within 24 hours of surgery  Rhythm: regular  Rate: normal    (+) hypertension, hyperlipidemia,   (-) murmur    ROS comment:    Normal sinus rhythm  Normal ECG  No previous ECGs available  Confirmed by NICOLÁS OVALLE MD ([id number]) on 6/19/2020 3:12:53 PM    Neuro/Psych  (+) TIA, psychiatric history Depression,     GI/Hepatic/Renal/Endo    (+) obesity,  GERD, GI bleeding lower , renal disease (Creatinine 1.96) stones and CRI, thyroid problem hypothyroidism    ROS Comment: HGB 11.5 HCT 34.9    Musculoskeletal     (+) back pain (Lumbar stenosis.),   Abdominal   (+) obese,    Substance History - negative use     OB/GYN negative ob/gyn ROS         Other   arthritis,                        Anesthesia Plan    ASA 3     general     intravenous induction     Anesthetic plan, all risks, benefits, and alternatives have been provided, discussed and informed consent has been obtained with: patient.

## 2020-07-02 NOTE — ED NOTES
O2@2L NC placed at this time d/t sats being 89% on room air. WIll continue to monitor closely.     Robin Loyola RN  07/01/20 0608

## 2020-07-02 NOTE — ED NOTES
Spoke with pts son Sabas Hansen [phone] and provided status update. All questions answered and son verbalizes understanding of information.      Robin Loyola RN  07/01/20 7331

## 2020-07-02 NOTE — CONSULTS
Inpatient Urology Consult  Consult performed by: Lorena Chun APRN  Consult ordered by: Devon Carpenter MD    Inpatient Urology Consult  Consult performed by: Lorena Chun APRN  Consult ordered by: Devon Carpenter MD          Patient Care Team:  Yoselin Rubio MD as PCP - General (Family Medicine)    Chief complaint: worsening flank pain, nausea and vomiting    Subjective     Marielle Hansen is a 70-year-old female consulted to Urology for left ureteral stones obstructing after LEFT ESWL on 6/25/20 for 1 cm left renal stone. She reports after procedure she has some flank pain but about 3 days ago her left flank pian worsened and was not relieved by Percocet or Toradol. She has associated nausea and vomiting. She has increased frequency, urgency and decreased urine output. No hematuria or fever.     Abdominal Pain   This is a new problem. The current episode started in the past 7 days. The problem occurs constantly. The problem has been gradually worsening. The pain is located in the left flank. The pain is moderate. The quality of the pain is sharp and aching. The abdominal pain does not radiate. Associated symptoms include frequency, nausea and vomiting. Pertinent negatives include no arthralgias, dysuria, fever or hematuria. The pain is relieved by nothing. She has tried antibiotics and oral narcotic analgesics for the symptoms. The treatment provided no relief. Prior diagnostic workup includes CT scan.       Review of Systems   Constitutional: Positive for activity change and appetite change. Negative for chills and fever.   HENT: Negative.    Eyes: Negative.    Respiratory: Negative.    Cardiovascular: Negative.    Gastrointestinal: Positive for abdominal pain, nausea and vomiting.   Endocrine: Negative.    Genitourinary: Positive for decreased urine volume, flank pain, frequency and urgency. Negative for difficulty urinating, dysuria and hematuria.   Musculoskeletal: Negative for arthralgias and gait  problem.   Skin: Negative.    Allergic/Immunologic: Negative.    Neurological: Negative.    Hematological: Negative.    Psychiatric/Behavioral: Negative.         Past Medical History:   Diagnosis Date   • Arthritis    • Disease of thyroid gland    • GERD (gastroesophageal reflux disease)    • Hyperlipidemia    • Hypertension    • TIA (transient ischemic attack)    ,   Past Surgical History:   Procedure Laterality Date   • APPENDECTOMY     • CHOLECYSTECTOMY     • COLONOSCOPY N/A 1/30/2020    Procedure: COLONOSCOPY;  Surgeon: Ashlyn King MD;  Location: [facility] ENDOSCOPY;  Service: Gastroenterology   • ENDOSCOPY N/A 1/30/2020    Procedure: ESOPHAGOGASTRODUODENOSCOPY;  Surgeon: Ashlyn King MD;  Location: [facility] ENDOSCOPY;  Service: Gastroenterology   • EXTRACORPOREAL SHOCKWAVE LITHOTRIPSY (ESWL), STENT INSERTION/REMOVAL Left 6/25/2020    Procedure: LEFT EXTRACORPOREAL SHOCKWAVE LITHOTRIPSY;  Surgeon: Johnny Chino MD;  Location: [facility] OR;  Service: Urology;  Laterality: Left;   • HYSTERECTOMY     • TOTAL KNEE ARTHROPLASTY Bilateral    , History reviewed. No pertinent family history.,   Social History     Tobacco Use   • Smoking status: Former Smoker   • Smokeless tobacco: Never Used   Substance Use Topics   • Alcohol use: No     Frequency: Never   • Drug use: Never   ,   Medications Prior to Admission   Medication Sig Dispense Refill Last Dose   • albuterol sulfate  (90 Base) MCG/ACT inhaler Inhale 2 puffs Every 4 (Four) Hours As Needed for Wheezing. 18 g 5 Patient Taking Differently at Unknown time   • amLODIPine (NORVASC) 5 MG tablet Take 1 tablet by mouth Daily. 30 tablet 5 7/1/2020 at Unknown time   • Cholecalciferol (VITAMIN D3) 1.25 MG (20069 UT) capsule Take 1 capsule by mouth 2 (Two) Times a Week. 8 capsule 5 7/1/2020 at Unknown time   • cyanocobalamin 1000 MCG/ML injection Inject 1 mL into the appropriate muscle as directed by prescriber Every 30 (Thirty) Days. 1 mL 3 Past Month at  "Unknown time   • DRYSOL 20 % external solution Daily.   7/1/2020 at Unknown time   • escitalopram (LEXAPRO) 10 MG tablet Take 10 mg by mouth Daily.   7/1/2020 at Unknown time   • levoFLOXacin (Levaquin) 250 MG tablet Take 1 tablet by mouth Daily for 7 days. 7 tablet 0 7/1/2020 at Unknown time   • levothyroxine (SYNTHROID, LEVOTHROID) 137 MCG tablet Take 1 tablet by mouth Daily. 30 tablet 5 7/1/2020 at Unknown time   • lovastatin (MEVACOR) 40 MG tablet Take 40 mg by mouth Daily.   7/1/2020 at Unknown time   • metoprolol succinate XL (TOPROL-XL) 100 MG 24 hr tablet Take 100 mg by mouth Daily.   7/1/2020 at Unknown time   • olmesartan-hydrochlorothiazide (Benicar HCT) 40-12.5 MG per tablet Take 1 tablet by mouth Daily. 90 tablet 1 7/1/2020 at Unknown time   • oxybutynin XL (DITROPAN-XL) 10 MG 24 hr tablet Take 10 mg by mouth Daily.   7/1/2020 at Unknown time   • oxyCODONE-acetaminophen (PERCOCET) 7.5-325 MG per tablet Take 1-2 tablets by mouth Every 4 (Four) Hours As Needed (Pain) for up to 3 days. 10 tablet 0 7/1/2020 at Unknown time   • promethazine (PHENERGAN) 12.5 MG tablet Take 1 tablet by mouth Every 6 (Six) Hours As Needed for Nausea or Vomiting. 20 tablet 0 7/1/2020 at Unknown time   • ketorolac (TORADOL) 10 MG tablet Take 10 mg by mouth 2 (Two) Times a Day As Needed for Moderate Pain .   More than a month at Unknown time   • nitrofurantoin (MACRODANTIN) 100 MG capsule Take 100 mg by mouth Daily.   Unknown at Unknown time   • Syringe 22G X 1\" 3 ML misc Use to inject B-12 injection into appropriate muscle 1 each 3         Allergies:  Codeine    Objective      Vital Signs  Temp:  [96.1 °F (35.6 °C)-98.1 °F (36.7 °C)] 98.1 °F (36.7 °C)  Heart Rate:  [65-82] 65  Resp:  [16-22] 16  BP: (101-141)/(51-94) 121/66    Physical Exam   Constitutional: She is oriented to person, place, and time. She appears well-developed and well-nourished. No distress.   HENT:   Head: Normocephalic and atraumatic.   Right Ear: External " ear normal.   Left Ear: External ear normal.   Eyes: Pupils are equal, round, and reactive to light. Conjunctivae and EOM are normal. Right eye exhibits no discharge. Left eye exhibits no discharge. No scleral icterus.   Neck: Normal range of motion. No tracheal deviation present. No thyromegaly present.   Cardiovascular: Intact distal pulses.   Pulmonary/Chest: Effort normal. No respiratory distress.   Abdominal: Soft. She exhibits no distension. There is tenderness. There is CVA tenderness (left).   Musculoskeletal: She exhibits no edema.   Neurological: She is alert and oriented to person, place, and time.   Skin: Skin is warm and dry. Capillary refill takes less than 2 seconds. No rash noted. She is not diaphoretic. No erythema.   Psychiatric: She has a normal mood and affect. Her behavior is normal.   Nursing note and vitals reviewed.      Results Review:   Lab Results (last 24 hours)     Procedure Component Value Units Date/Time    Extra Tubes [[id number]] Collected:  07/01/20 2025    Specimen:  Blood, Venous Line Updated:  07/01/20 2130    Narrative:       The following orders were created for panel order Extra Tubes.  Procedure                               Abnormality         Status                     ---------                               -----------         ------                     Light Blue Top[[id number]]                                   Final result               Gold Top - SST[[id number]]                                   Final result                 Please view results for these tests on the individual orders.    Light Blue Top [[id number]] Collected:  07/01/20 2025    Specimen:  Blood Updated:  07/01/20 2130     Extra Tube hold for add-on     Comment: Auto resulted       Gold Top - SST [[id number]] Collected:  07/01/20 2025    Specimen:  Blood Updated:  07/01/20 2130     Extra Tube Hold for add-ons.     Comment: Auto resulted.       Comprehensive Metabolic Panel [[id number]]  (Abnormal) Collected:   07/01/20 2022    Specimen:  Blood Updated:  07/01/20 2101     Glucose 136 mg/dL      BUN 32 mg/dL      Creatinine 2.10 mg/dL      Sodium 133 mmol/L      Potassium 4.2 mmol/L      Chloride 96 mmol/L      CO2 22.0 mmol/L      Calcium 9.0 mg/dL      Total Protein 7.2 g/dL      Albumin 4.00 g/dL      ALT (SGPT) 16 U/L      AST (SGOT) 23 U/L      Alkaline Phosphatase 40 U/L      Total Bilirubin 0.4 mg/dL      eGFR Non African Amer 23 mL/min/1.73      Globulin 3.2 gm/dL      A/G Ratio 1.3 g/dL      BUN/Creatinine Ratio 15.2     Anion Gap 15.0 mmol/L     Narrative:       GFR Normal >60  Chronic Kidney Disease <60  Kidney Failure <15      Urinalysis, Microscopic Only - Urine, Clean Catch [[id number]]  (Abnormal) Collected:  07/01/20 2008    Specimen:  Urine, Clean Catch Updated:  07/01/20 2030     RBC, UA None Seen /HPF      WBC, UA 6-12 /HPF      Bacteria, UA 2+ /HPF      Squamous Epithelial Cells, UA 3-5 /HPF      Hyaline Casts, UA 31-50 /LPF      Methodology Manual Light Microscopy    Urine Culture - Urine, Urine, Clean Catch [[id number]] Collected:  07/01/20 2008    Specimen:  Urine, Clean Catch Updated:  07/01/20 2030    CBC & Differential [[id number]] Collected:  07/01/20 2022    Specimen:  Blood Updated:  07/01/20 2028    Narrative:       The following orders were created for panel order CBC & Differential.  Procedure                               Abnormality         Status                     ---------                               -----------         ------                     CBC Auto Differential[[id number]]        Abnormal            Final result                 Please view results for these tests on the individual orders.    CBC Auto Differential [[id number]]  (Abnormal) Collected:  07/01/20 2022    Specimen:  Blood Updated:  07/01/20 2028     WBC 11.97 10*3/mm3      RBC 4.34 10*6/mm3      Hemoglobin 12.5 g/dL      Hematocrit 36.7 %      MCV 84.6 fL      MCH 28.8 pg      MCHC 34.1 g/dL      RDW 14.5 %      RDW-SD  44.6 fl      MPV 9.5 fL      Platelets 182 10*3/mm3      Neutrophil % 70.1 %      Lymphocyte % 13.3 %      Monocyte % 14.0 %      Eosinophil % 1.1 %      Basophil % 0.4 %      Immature Grans % 1.1 %      Neutrophils, Absolute 8.39 10*3/mm3      Lymphocytes, Absolute 1.59 10*3/mm3      Monocytes, Absolute 1.68 10*3/mm3      Eosinophils, Absolute 0.13 10*3/mm3      Basophils, Absolute 0.05 10*3/mm3      Immature Grans, Absolute 0.13 10*3/mm3      nRBC 0.0 /100 WBC     Urinalysis With Culture If Indicated - Urine, Clean Catch [[id number]]  (Abnormal) Collected:  07/01/20 2008    Specimen:  Urine, Clean Catch Updated:  07/01/20 2022     Color, UA Dark Yellow     Appearance, UA Cloudy     pH, UA <=5.0     Specific Gravity, UA 1.039     Glucose, UA Negative     Ketones, UA Trace     Bilirubin, UA Moderate (2+)     Blood, UA Negative     Protein, UA 30 mg/dL (1+)     Leuk Esterase, UA Trace     Nitrite, UA Negative     Urobilinogen, UA 1.0 E.U./dL         Imaging Results (Last 24 Hours)     ** No results found for the last 24 hours. **           I reviewed the patient's new clinical results.  I reviewed the patient's new imaging results and agree with the interpretation.  I reviewed the patient's other test results and agree with the interpretation        Assessment/Plan       CARMEN (acute kidney injury) (CMS/[facility])      Assessment & Plan    Consulted to Urology for 4 ureteric stones at the proximal left ureter causing mild obstructing with a very small amount of left perirenal space fluid, flank pain, nausea, vomiting, UTI symptoms. UA today suggests UTI cystitis, urine culture IP. No fever. Has acute kidney injury.  -Estimated Creatinine Clearance: 33 mL/min (A) (by C-G formula based on SCr of 1.96 mg/dL (H)).  -Cr 2.10-->1.96  -WBC 11.97-->7.52  -Rocephin day #2    Plan:  NPO for CYSTOSCOPY LEFT URETEROSCOPY RETROGRADE PYELOGRAM HOLMIUM LASER STENT INSERTION this PM with Dr. Chino    I discussed the patient's findings and  my recommendations with patient, nursing staff and Dr. Matti Chun, BIBI  07/02/20  10:32

## 2020-07-02 NOTE — PLAN OF CARE
Problem: Patient Care Overview  Goal: Plan of Care Review  Outcome: Ongoing (interventions implemented as appropriate)  Flowsheets (Taken 7/2/2020 0400)  Progress: no change  Plan of Care Reviewed With: patient  Outcome Summary: Patient new to unit; no current complaints aside from pain; will continue to monitor

## 2020-07-02 NOTE — H&P
[facility] Admission      Date of Admission: 7/1/2020      Primary Care Physician: Yoselin Rubio MD      Chief Complaint: I cannot keep anything down    HPI: Briefly this is a 80-year-old female who presents the ED today carrying the following problem list    1.  Class II obesity obese related hypertension and dyslipidemia and likely at least impaired fasting glucose  2.  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  History of nephrolithiasis  -Initial proximally 15 to 18 years prior  - Recent 6 days out from lithotripsy here  4.  Hypothyroid state on replacement  5.  Past history of bilateral total knees as well as remote cholecystectomy    Patient presents after having lithotripsy 6/25.  She been having flank pain and prescribed by her PCP some Toradol.  She underwent lithotripsy and was feeling better.  Use of Percocet until 3 days post for developing more pain nausea vomiting represented to the ED on 629.  There she was found to have a creatinine above her baseline at 1.4.  In addition she still had some left hydro-with retained fragments by CT scanning on that day.  She was given antiemetics and narcotics and discharged home.  She presents back unable to keep down fluids and worsening renal status with a BUN and creatinine of 32 and 2.1.  ED contacted Dr. Chino and will see in the a.m. and we been asked to admit for further evaluation treatment.  Patient denies use of over-the-counter NSAIDs but was taking prescription medications which I presume was Toradol in addition to her Percocet.  He has no fever chills or Reiger's.    Concurrent Medical History:  has a past medical history of Arthritis, Disease of thyroid gland, GERD (gastroesophageal reflux disease), Hyperlipidemia, Hypertension, and TIA (transient ischemic attack).    Past Surgical History:  has a past surgical history that includes Total knee arthroplasty (Bilateral);  Cholecystectomy; Appendectomy; Hysterectomy; Esophagogastroduodenoscopy (N/A, 1/30/2020); Colonoscopy (N/A, 1/30/2020); and extracorporeal shockwave lithotripsy (eswl), stent insertion/removal (Left, 6/25/2020).    Family History: family history is not on file.  No significant family tendencies other than diabetes and hypertension    Social History:  reports that she has quit smoking. She has never used smokeless tobacco. She reports that she does not drink alcohol or use drugs.    Allergies:   Allergies   Allergen Reactions   • Codeine Hives       Medications:   Prior to Admission medications    Medication Sig Start Date End Date Taking? Authorizing Provider   albuterol sulfate  (90 Base) MCG/ACT inhaler Inhale 2 puffs Every 4 (Four) Hours As Needed for Wheezing. 1/17/20   Yoselin Rubio MD   amLODIPine (NORVASC) 5 MG tablet Take 1 tablet by mouth Daily. 3/9/20   Yoselin Rubio MD   Cholecalciferol (VITAMIN D3) 1.25 MG (79663 UT) capsule Take 1 capsule by mouth 2 (Two) Times a Week. 10/24/19   Yoselin Rubio MD   cyanocobalamin 1000 MCG/ML injection Inject 1 mL into the appropriate muscle as directed by prescriber Every 30 (Thirty) Days. 6/18/20   Yoselin Rubio MD   DRYSOL 20 % external solution Daily. 2/6/20   Hilario Jenkins MD   escitalopram (LEXAPRO) 10 MG tablet Take 10 mg by mouth Daily.    Hilario Jenkins MD   ketorolac (TORADOL) 10 MG tablet Take 10 mg by mouth 2 (Two) Times a Day As Needed for Moderate Pain .    Hilario Jenkins MD   levoFLOXacin (Levaquin) 250 MG tablet Take 1 tablet by mouth Daily for 7 days. 6/25/20 7/2/20  Hampton, Johnny WOLFF MD   levothyroxine (SYNTHROID, LEVOTHROID) 137 MCG tablet Take 1 tablet by mouth Daily. 3/3/20   Yoselin Rubio MD   lovastatin (MEVACOR) 40 MG tablet Take 40 mg by mouth Daily.    Hilario Jenkins MD   metoprolol succinate XL (TOPROL-XL) 100 MG 24 hr tablet Take 100 mg by mouth Daily.    Hilario Jenkins MD    "  nitrofurantoin (MACRODANTIN) 100 MG capsule Take 100 mg by mouth Daily.    Provider, MD Hilario   olmesartan-hydrochlorothiazide (Benicar HCT) 40-12.5 MG per tablet Take 1 tablet by mouth Daily. 4/28/20   Yoselin Rubio MD   oxybutynin XL (DITROPAN-XL) 10 MG 24 hr tablet Take 10 mg by mouth Daily.    Provider, MD Hilario   oxyCODONE-acetaminophen (PERCOCET) 7.5-325 MG per tablet Take 1-2 tablets by mouth Every 4 (Four) Hours As Needed (Pain) for up to 3 days. 6/29/20 7/2/20  Jaya Mcgee MD   promethazine (PHENERGAN) 12.5 MG tablet Take 1 tablet by mouth Every 6 (Six) Hours As Needed for Nausea or Vomiting. 6/29/20   Vineet Faulkner PA-C   Syringe 22G X 1\" 3 ML misc Use to inject B-12 injection into appropriate muscle 6/18/20   Yoselin Rubio MD       Review of Systems:  Review of Systems   Constitutional: Positive for appetite change.   HENT: Negative.    Eyes: Negative.    Respiratory: Negative.    Cardiovascular: Negative.    Gastrointestinal: Positive for nausea and vomiting.   Endocrine: Negative.    Genitourinary: Negative.         Flank pain   Musculoskeletal: Negative.    Skin: Negative.    Allergic/Immunologic: Negative.    Neurological: Negative.    Hematological: Negative.    Psychiatric/Behavioral: Negative.       Otherwise complete ROS is negative except as mentioned above.    Physical Exam:   Temp:  [96.1 °F (35.6 °C)] 96.1 °F (35.6 °C)  Heart Rate:  [74-82] 80  Resp:  [18-22] 22  BP: (127-141)/(59-94) 132/72  Physical Exam   Constitutional: She is oriented to person, place, and time. She appears well-developed and well-nourished.   Some central obesity   HENT:   Head: Normocephalic and atraumatic.   Eyes: Pupils are equal, round, and reactive to light. EOM are normal.   Neck: Normal range of motion. Neck supple.   Cardiovascular: Normal rate and regular rhythm.   Pulmonary/Chest: Effort normal and breath sounds normal.   Abdominal: Bowel sounds are normal. "   Musculoskeletal: Normal range of motion.   Neurological: She is alert and oriented to person, place, and time.   Does have some right lip droop   Skin: Skin is warm. Capillary refill takes less than 2 seconds.   Psychiatric: She has a normal mood and affect. Her behavior is normal. Judgment and thought content normal.   Nursing note and vitals reviewed.        Results Reviewed:  I have personally reviewed current lab, radiology, and data and agree with results.  Lab Results (last 24 hours)     Procedure Component Value Units Date/Time    Extra Tubes [[id number]] Collected:  07/01/20 2025    Specimen:  Blood, Venous Line Updated:  07/01/20 2130    Narrative:       The following orders were created for panel order Extra Tubes.  Procedure                               Abnormality         Status                     ---------                               -----------         ------                     Light Blue Top[[id number]]                                   Final result               Gold Top - SST[[id number]]                                   Final result                 Please view results for these tests on the individual orders.    Light Blue Top [[id number]] Collected:  07/01/20 2025    Specimen:  Blood Updated:  07/01/20 2130     Extra Tube hold for add-on     Comment: Auto resulted       Gold Top - SST [[id number]] Collected:  07/01/20 2025    Specimen:  Blood Updated:  07/01/20 2130     Extra Tube Hold for add-ons.     Comment: Auto resulted.       Comprehensive Metabolic Panel [[id number]]  (Abnormal) Collected:  07/01/20 2022    Specimen:  Blood Updated:  07/01/20 2101     Glucose 136 mg/dL      BUN 32 mg/dL      Creatinine 2.10 mg/dL      Sodium 133 mmol/L      Potassium 4.2 mmol/L      Chloride 96 mmol/L      CO2 22.0 mmol/L      Calcium 9.0 mg/dL      Total Protein 7.2 g/dL      Albumin 4.00 g/dL      ALT (SGPT) 16 U/L      AST (SGOT) 23 U/L      Alkaline Phosphatase 40 U/L      Total Bilirubin 0.4 mg/dL       eGFR Non African Amer 23 mL/min/1.73      Globulin 3.2 gm/dL      A/G Ratio 1.3 g/dL      BUN/Creatinine Ratio 15.2     Anion Gap 15.0 mmol/L     Narrative:       GFR Normal >60  Chronic Kidney Disease <60  Kidney Failure <15      Urinalysis, Microscopic Only - Urine, Clean Catch [[id number]]  (Abnormal) Collected:  07/01/20 2008    Specimen:  Urine, Clean Catch Updated:  07/01/20 2030     RBC, UA None Seen /HPF      WBC, UA 6-12 /HPF      Bacteria, UA 2+ /HPF      Squamous Epithelial Cells, UA 3-5 /HPF      Hyaline Casts, UA 31-50 /LPF      Methodology Manual Light Microscopy    Urine Culture - Urine, Urine, Clean Catch [[id number]] Collected:  07/01/20 2008    Specimen:  Urine, Clean Catch Updated:  07/01/20 2030    CBC & Differential [[id number]] Collected:  07/01/20 2022    Specimen:  Blood Updated:  07/01/20 2028    Narrative:       The following orders were created for panel order CBC & Differential.  Procedure                               Abnormality         Status                     ---------                               -----------         ------                     CBC Auto Differential[[id number]]        Abnormal            Final result                 Please view results for these tests on the individual orders.    CBC Auto Differential [[id number]]  (Abnormal) Collected:  07/01/20 2022    Specimen:  Blood Updated:  07/01/20 2028     WBC 11.97 10*3/mm3      RBC 4.34 10*6/mm3      Hemoglobin 12.5 g/dL      Hematocrit 36.7 %      MCV 84.6 fL      MCH 28.8 pg      MCHC 34.1 g/dL      RDW 14.5 %      RDW-SD 44.6 fl      MPV 9.5 fL      Platelets 182 10*3/mm3      Neutrophil % 70.1 %      Lymphocyte % 13.3 %      Monocyte % 14.0 %      Eosinophil % 1.1 %      Basophil % 0.4 %      Immature Grans % 1.1 %      Neutrophils, Absolute 8.39 10*3/mm3      Lymphocytes, Absolute 1.59 10*3/mm3      Monocytes, Absolute 1.68 10*3/mm3      Eosinophils, Absolute 0.13 10*3/mm3      Basophils, Absolute 0.05 10*3/mm3       Immature Grans, Absolute 0.13 10*3/mm3      nRBC 0.0 /100 WBC     Urinalysis With Culture If Indicated - Urine, Clean Catch [[id number]]  (Abnormal) Collected:  07/01/20 2008    Specimen:  Urine, Clean Catch Updated:  07/01/20 2022     Color, UA Dark Yellow     Appearance, UA Cloudy     pH, UA <=5.0     Specific Gravity, UA 1.039     Glucose, UA Negative     Ketones, UA Trace     Bilirubin, UA Moderate (2+)     Blood, UA Negative     Protein, UA 30 mg/dL (1+)     Leuk Esterase, UA Trace     Nitrite, UA Negative     Urobilinogen, UA 1.0 E.U./dL        Imaging Results (Last 24 Hours)     ** No results found for the last 24 hours. **            Assessment:    Active Hospital Problems    Diagnosis   • CARMEN (acute kidney injury) (CMS/[facility])         Impression    1.  Acute kidney injury  -Likely related to volume depletion with also plus outpatient NSAID exposure  -Certainly avoid NSAIDs  -Hydrate  -Hold ARB and thiazide  Note recent left hydronephrosis    2.  Nephrolithiasis  -As per urology  -Left hydro-  -We will continue antiemetics and narcotics currently    3.  Elevated random blood sugar and review of history  -We will check A1c    4.  Hypertension  -Continue current medications  -Except holding combination ARB thiazide with acute kidney injury    5.  Hypothyroid state  -Continue Synthroid    6.  Depression  -Note better since initiated antidepressant several months back number of CODE STATUS patient is a full code    Prophylaxis  - VT with SCDs    Discussed with ED physician as well as ED nursing patient will be admitted to observation currently.      Devon Carpenter MD

## 2020-07-02 NOTE — PLAN OF CARE
Problem: Patient Care Overview  Goal: Plan of Care Review  Outcome: Ongoing (interventions implemented as appropriate)  Flowsheets  Taken 7/2/2020 1307  Progress: improving  Outcome Summary: Patient pain tolerable with prn pain medication, nausea improved with zofran, straining urine. Patient to have surgery later today, covid swab pending, room air, vital signs stable.  Taken 7/2/2020 5145  Plan of Care Reviewed With: patient

## 2020-07-02 NOTE — ED PROVIDER NOTES
Subjective   70-year-old female presents to the emergency department with complaint of left flank pain.  Patient had lithotripsy approximately 7 days ago for known nephrolithiasis and ureterolithiasis.  Denies stent placement.  Reports that she was seen just a couple days ago for worsening pain.  Imaging at that time revealed 4 up to 4 mm proximal ureteral lithiasis with some hydronephrosis.  Patient reports increasing pain, and significant nausea and vomiting since that time.  Decreased urinary output.  Presents here for further evaluation.  Reports pain medication not helping.    Family history, surgical history, social history, current medications and allergies are reviewed with the patient and triage documentation and vitals are reviewed.      History provided by:  Patient and medical records   used: No        Review of Systems   Constitutional: Negative for chills and fever.   HENT: Negative.    Eyes: Negative.    Respiratory: Negative for cough, shortness of breath and wheezing.    Cardiovascular: Negative for chest pain and palpitations.   Gastrointestinal: Positive for abdominal pain, nausea and vomiting.   Endocrine: Negative.    Genitourinary: Positive for decreased urine volume, flank pain, frequency and urgency. Negative for hematuria.   Musculoskeletal: Positive for back pain. Negative for arthralgias, myalgias and neck pain.   Skin: Negative for rash and wound.   Allergic/Immunologic: Negative.    Neurological: Negative.    Hematological: Negative.    Psychiatric/Behavioral: Negative.        Past Medical History:   Diagnosis Date   • Arthritis    • Disease of thyroid gland    • GERD (gastroesophageal reflux disease)    • Hyperlipidemia    • Hypertension    • TIA (transient ischemic attack)        Allergies   Allergen Reactions   • Codeine Hives       Past Surgical History:   Procedure Laterality Date   • APPENDECTOMY     • CHOLECYSTECTOMY     • COLONOSCOPY N/A 1/30/2020     Procedure: COLONOSCOPY;  Surgeon: Ashlyn King MD;  Location: [facility] ENDOSCOPY;  Service: Gastroenterology   • ENDOSCOPY N/A 1/30/2020    Procedure: ESOPHAGOGASTRODUODENOSCOPY;  Surgeon: Ashlyn King MD;  Location: [facility] ENDOSCOPY;  Service: Gastroenterology   • EXTRACORPOREAL SHOCKWAVE LITHOTRIPSY (ESWL), STENT INSERTION/REMOVAL Left 6/25/2020    Procedure: LEFT EXTRACORPOREAL SHOCKWAVE LITHOTRIPSY;  Surgeon: Johnny Chino MD;  Location: [facility] OR;  Service: Urology;  Laterality: Left;   • HYSTERECTOMY     • TOTAL KNEE ARTHROPLASTY Bilateral        History reviewed. No pertinent family history.    Social History     Socioeconomic History   • Marital status:      Spouse name: Not on file   • Number of children: Not on file   • Years of education: Not on file   • Highest education level: Not on file   Tobacco Use   • Smoking status: Former Smoker   • Smokeless tobacco: Never Used   Substance and Sexual Activity   • Alcohol use: No     Frequency: Never   • Drug use: Never   • Sexual activity: Defer           Objective   Physical Exam   Constitutional: She is oriented to person, place, and time. She appears well-developed and well-nourished. No distress.   HENT:   Head: Normocephalic.   Mouth/Throat: Oropharynx is clear and moist.   Eyes: Pupils are equal, round, and reactive to light.   Neck: Normal range of motion.   Cardiovascular: Normal rate and regular rhythm.   Pulmonary/Chest: Effort normal and breath sounds normal.   Abdominal: Soft. Bowel sounds are normal. She exhibits no distension. There is tenderness. There is CVA tenderness (left). There is no rigidity, no rebound and no guarding.   Musculoskeletal: Normal range of motion.   Neurological: She is alert and oriented to person, place, and time.   Skin: Skin is warm and dry. Capillary refill takes less than 2 seconds. She is not diaphoretic.   Nursing note and vitals reviewed.      Procedures  none         ED Course      Labs Reviewed    COMPREHENSIVE METABOLIC PANEL - Abnormal; Notable for the following components:       Result Value    Glucose 136 (*)     BUN 32 (*)     Creatinine 2.10 (*)     Sodium 133 (*)     Chloride 96 (*)     eGFR Non  Amer 23 (*)     All other components within normal limits    Narrative:     GFR Normal >60  Chronic Kidney Disease <60  Kidney Failure <15     URINALYSIS W/ CULTURE IF INDICATED - Abnormal; Notable for the following components:    Appearance, UA Cloudy (*)     Specific Gravity, UA 1.039 (*)     Ketones, UA Trace (*)     Bilirubin, UA Moderate (2+) (*)     Protein, UA 30 mg/dL (1+) (*)     Leuk Esterase, UA Trace (*)     All other components within normal limits   CBC WITH AUTO DIFFERENTIAL - Abnormal; Notable for the following components:    WBC 11.97 (*)     Lymphocyte % 13.3 (*)     Monocyte % 14.0 (*)     Immature Grans % 1.1 (*)     Neutrophils, Absolute 8.39 (*)     Monocytes, Absolute 1.68 (*)     Immature Grans, Absolute 0.13 (*)     All other components within normal limits   URINALYSIS, MICROSCOPIC ONLY - Abnormal; Notable for the following components:    WBC, UA 6-12 (*)     Bacteria, UA 2+ (*)     Squamous Epithelial Cells, UA 3-5 (*)     All other components within normal limits   URINE CULTURE   CBC AND DIFFERENTIAL    Narrative:     The following orders were created for panel order CBC & Differential.  Procedure                               Abnormality         Status                     ---------                               -----------         ------                     CBC Auto Differential[[id number]]        Abnormal            Final result                 Please view results for these tests on the individual orders.   EXTRA TUBES    Narrative:     The following orders were created for panel order Extra Tubes.  Procedure                               Abnormality         Status                     ---------                               -----------         ------                      Light Blue Top[[id number]]                                   Final result               Gold Top - SST[[id number]]                                   Final result                 Please view results for these tests on the individual orders.   LIGHT BLUE TOP   [facility]     Ct Abdomen Pelvis Stone Protocol    Result Date: 6/29/2020  Narrative: PROCEDURE: Ct abdomen and pelvis without contrast REASON FOR EXAM: Kidney stone, known, follow up FINDINGS: Comparison study dated  March 11, 2020. Axial computer tomography sequential imaging was performed from the diaphragms through the symphysis pubis without IV contrast administration. Sagittal and coronal reformates was performed. This exam was performed according to our departmental dose optimization program, which includes automated exposure control, adjustment of the mA and/or KV according to patient size and/or use of iterative reconstruction technique. Imaging through lung bases reveals stable left lung base calcified lung parenchymal granuloma consistent with old granulomatous disease. Lung bases are otherwise normal. The liver is normal. The gallbladder surgically absent. The biliary system appears within normal limits status post cholecystectomy. The pancreas is normal. The spleen is normal. Bilateral adrenal glands are normal. The right kidney and ureter are normal. Left kidney multiple mid zone and lower pole nonobstructive renal calculi. The largest one is in the lower pole and measures 6.9 mm in greatest diameter. Left kidney mild hydronephrosis secondary to proximal left ureter 4 separate ureter calculi which up in a row with the largest measuring 3.9 mm in greatest width. Very small amount of left perirenal space fluid. The left kidney and ureter are otherwise unremarkable. The bladder is normal. The uterus is normal. Scattered sigmoid and descending colon diverticula. The hollow viscera is otherwise normal. No lymphadenopathy in the abdomen or the  pelvis. No acute osseous abnormality.     Impression: 1.Left kidney mild hydronephrosis secondary to proximal left ureter 4 separate ureter calculi which up in a row with the largest measuring 3.9 mm in greatest width. Very small amount of left perirenal space fluid. 2. Left kidney multiple mid zone and lower pole nonobstructive renal calculi. 3. Colonic diverticulosis. Electronically signed by:  Maxi Rowell MD  6/29/2020 12:45 PM CDT Workstation: [id number]    Xr Abdomen Kub    Result Date: 6/25/2020  Narrative: EXAM DESCRIPTION: XR ABDOMEN KUB COMPARISON: 6/16/2020 from [facility] HISTORY: Stone location. TECHNIQUE: Supine frontal view of the abdomen and pelvis. FINDINGS: The bowel gas pattern is non-obstructive. No free air seen on this supine radiograph. The volume of stool is not considered excessive. Status post cholecystectomy. Redemonstration of an approximate 9.5 mm triangular-shaped calcification superimposing the left mid renal silhouette similar to the prior examination. Suggestion of additional smaller calcifications the same vicinity with the next largest measuring 3-4 mm. No radiopaque calcification superimposing the region of the right renal silhouette. There is redemonstration of numerous calcifications within the left soft tissue pelvis that favor vascular calcification/phleboliths. Degenerative changes and mild dextrocurvature of the spine. Degenerative changes of both hips, symphysis and sacroiliac joints.     Impression: Nonobstructive bowel gas pattern. Redemonstration of left nephrolithiasis in similar position. Electronically signed by:  Judson Lepe MD  6/25/2020 5:42 PM CDT Workstation: [id number]          MDM  Number of Diagnoses or Management Options  Acute cystitis with hematuria:   CARMEN (acute kidney injury) (CMS/[facility]):   Ureterolithiasis:      Amount and/or Complexity of Data Reviewed  Clinical lab tests: reviewed  Tests in the radiology section of CPT®: reviewed  (From recent visit)  Discuss the patient with other providers: yes    Patient Progress  Patient progress: stable    Patient with worsening acute kidney injury with creatinine up to 2.  Recent imaging reviewed revealing ureterolithiasis proximally with the hydronephrosis.  Patient also with worsening urine evidence of acute cystitis with hematuria.  Patient started on IV antibiotics.  Requires multiple rounds of pain medication but is more comfortable.  Spoke with urology,  [place] who agrees to consult on the patient.  Patient admitted to the hospitalist.    Final diagnoses:   CARMEN (acute kidney injury) (CMS/[facility])   Ureterolithiasis   Acute cystitis with hematuria            Trent Carrillo,   07/02/20 0614

## 2020-07-03 ENCOUNTER — READMISSION MANAGEMENT (OUTPATIENT)
Dept: CALL CENTER | Facility: HOSPITAL | Age: 70
End: 2020-07-03

## 2020-07-03 VITALS
BODY MASS INDEX: 33.07 KG/M2 | RESPIRATION RATE: 18 BRPM | WEIGHT: 218.2 LBS | HEART RATE: 60 BPM | OXYGEN SATURATION: 95 % | SYSTOLIC BLOOD PRESSURE: 139 MMHG | DIASTOLIC BLOOD PRESSURE: 76 MMHG | TEMPERATURE: 98 F | HEIGHT: 68 IN

## 2020-07-03 LAB
ALBUMIN SERPL-MCNC: 3.9 G/DL (ref 3.5–5.2)
ALBUMIN/GLOB SERPL: 1.3 G/DL
ALP SERPL-CCNC: 41 U/L (ref 39–117)
ALT SERPL W P-5'-P-CCNC: 23 U/L (ref 1–33)
ANION GAP SERPL CALCULATED.3IONS-SCNC: 11 MMOL/L (ref 5–15)
AST SERPL-CCNC: 28 U/L (ref 1–32)
BACTERIA SPEC AEROBE CULT: NORMAL
BASOPHILS # BLD AUTO: 0.03 10*3/MM3 (ref 0–0.2)
BASOPHILS NFR BLD AUTO: 0.3 % (ref 0–1.5)
BILIRUB SERPL-MCNC: 0.2 MG/DL (ref 0.2–1.2)
BUN SERPL-MCNC: 31 MG/DL (ref 8–23)
BUN/CREAT SERPL: 19.9 (ref 7–25)
CALCIUM SPEC-SCNC: 8.7 MG/DL (ref 8.6–10.5)
CHLORIDE SERPL-SCNC: 99 MMOL/L (ref 98–107)
CO2 SERPL-SCNC: 25 MMOL/L (ref 22–29)
CREAT SERPL-MCNC: 1.56 MG/DL (ref 0.57–1)
DEPRECATED RDW RBC AUTO: 45.2 FL (ref 37–54)
EOSINOPHIL # BLD AUTO: 0.01 10*3/MM3 (ref 0–0.4)
EOSINOPHIL NFR BLD AUTO: 0.1 % (ref 0.3–6.2)
ERYTHROCYTE [DISTWIDTH] IN BLOOD BY AUTOMATED COUNT: 14.1 % (ref 12.3–15.4)
GFR SERPL CREATININE-BSD FRML MDRD: 33 ML/MIN/1.73
GLOBULIN UR ELPH-MCNC: 3.1 GM/DL
GLUCOSE SERPL-MCNC: 141 MG/DL (ref 65–99)
HCT VFR BLD AUTO: 36.7 % (ref 34–46.6)
HGB BLD-MCNC: 11.8 G/DL (ref 12–15.9)
IMM GRANULOCYTES # BLD AUTO: 0.14 10*3/MM3 (ref 0–0.05)
IMM GRANULOCYTES NFR BLD AUTO: 1.6 % (ref 0–0.5)
LYMPHOCYTES # BLD AUTO: 1.5 10*3/MM3 (ref 0.7–3.1)
LYMPHOCYTES NFR BLD AUTO: 16.9 % (ref 19.6–45.3)
MCH RBC QN AUTO: 28.1 PG (ref 26.6–33)
MCHC RBC AUTO-ENTMCNC: 32.2 G/DL (ref 31.5–35.7)
MCV RBC AUTO: 87.4 FL (ref 79–97)
MONOCYTES # BLD AUTO: 0.43 10*3/MM3 (ref 0.1–0.9)
MONOCYTES NFR BLD AUTO: 4.8 % (ref 5–12)
NEUTROPHILS NFR BLD AUTO: 6.79 10*3/MM3 (ref 1.7–7)
NEUTROPHILS NFR BLD AUTO: 76.3 % (ref 42.7–76)
NRBC BLD AUTO-RTO: 0 /100 WBC (ref 0–0.2)
PLATELET # BLD AUTO: 204 10*3/MM3 (ref 140–450)
PMV BLD AUTO: 9.8 FL (ref 6–12)
POTASSIUM SERPL-SCNC: 4 MMOL/L (ref 3.5–5.2)
PROT SERPL-MCNC: 7 G/DL (ref 6–8.5)
RBC # BLD AUTO: 4.2 10*6/MM3 (ref 3.77–5.28)
SODIUM SERPL-SCNC: 135 MMOL/L (ref 136–145)
WBC # BLD AUTO: 8.9 10*3/MM3 (ref 3.4–10.8)

## 2020-07-03 PROCEDURE — 63710000001 LEVOTHYROXINE 25 MCG TABLET: Performed by: UROLOGY

## 2020-07-03 PROCEDURE — 63710000001 ATORVASTATIN 10 MG TABLET: Performed by: UROLOGY

## 2020-07-03 PROCEDURE — 25010000002 ONDANSETRON PER 1 MG: Performed by: UROLOGY

## 2020-07-03 PROCEDURE — A9270 NON-COVERED ITEM OR SERVICE: HCPCS | Performed by: UROLOGY

## 2020-07-03 PROCEDURE — G0378 HOSPITAL OBSERVATION PER HR: HCPCS

## 2020-07-03 PROCEDURE — 85025 COMPLETE CBC W/AUTO DIFF WBC: CPT | Performed by: UROLOGY

## 2020-07-03 PROCEDURE — 63710000001 METOPROLOL SUCCINATE XL 50 MG TABLET SUSTAINED-RELEASE 24 HOUR: Performed by: UROLOGY

## 2020-07-03 PROCEDURE — 63710000001 ESCITALOPRAM 10 MG TABLET: Performed by: UROLOGY

## 2020-07-03 PROCEDURE — 63710000001 AMLODIPINE 5 MG TABLET: Performed by: UROLOGY

## 2020-07-03 PROCEDURE — 25010000002 HYDROMORPHONE 1 MG/ML SOLUTION: Performed by: UROLOGY

## 2020-07-03 PROCEDURE — 25010000002 MORPHINE PER 10 MG: Performed by: UROLOGY

## 2020-07-03 PROCEDURE — 63710000001 LEVOTHYROXINE 112 MCG TABLET: Performed by: UROLOGY

## 2020-07-03 PROCEDURE — 63710000001 OXYBUTYNIN XL 10 MG TABLET SUSTAINED-RELEASE 24 HOUR: Performed by: UROLOGY

## 2020-07-03 PROCEDURE — 80053 COMPREHEN METABOLIC PANEL: CPT | Performed by: UROLOGY

## 2020-07-03 RX ORDER — PROMETHAZINE HYDROCHLORIDE 12.5 MG/1
12.5 TABLET ORAL EVERY 6 HOURS PRN
Qty: 20 TABLET | Refills: 0 | Status: SHIPPED | OUTPATIENT
Start: 2020-07-03 | End: 2021-03-16

## 2020-07-03 RX ORDER — OXYCODONE AND ACETAMINOPHEN 7.5; 325 MG/1; MG/1
1-2 TABLET ORAL EVERY 4 HOURS PRN
Qty: 10 TABLET | Refills: 0
Start: 2020-07-03 | End: 2021-03-16

## 2020-07-03 RX ORDER — CEFDINIR 300 MG/1
300 CAPSULE ORAL 2 TIMES DAILY
Qty: 10 CAPSULE | Refills: 0 | Status: SHIPPED | OUTPATIENT
Start: 2020-07-03 | End: 2020-07-08

## 2020-07-03 RX ADMIN — METOPROLOL SUCCINATE 100 MG: 50 TABLET, EXTENDED RELEASE ORAL at 09:15

## 2020-07-03 RX ADMIN — MORPHINE SULFATE 1 MG: 2 INJECTION, SOLUTION INTRAMUSCULAR; INTRAVENOUS at 01:43

## 2020-07-03 RX ADMIN — AMLODIPINE BESYLATE 5 MG: 5 TABLET ORAL at 09:15

## 2020-07-03 RX ADMIN — HYDROMORPHONE HYDROCHLORIDE 0.5 MG: 1 INJECTION, SOLUTION INTRAMUSCULAR; INTRAVENOUS; SUBCUTANEOUS at 00:56

## 2020-07-03 RX ADMIN — ATORVASTATIN CALCIUM 10 MG: 10 TABLET, FILM COATED ORAL at 09:15

## 2020-07-03 RX ADMIN — ESCITALOPRAM OXALATE 10 MG: 10 TABLET ORAL at 09:22

## 2020-07-03 RX ADMIN — HYDROMORPHONE HYDROCHLORIDE 0.5 MG: 1 INJECTION, SOLUTION INTRAMUSCULAR; INTRAVENOUS; SUBCUTANEOUS at 07:58

## 2020-07-03 RX ADMIN — HYDROMORPHONE HYDROCHLORIDE 0.5 MG: 1 INJECTION, SOLUTION INTRAMUSCULAR; INTRAVENOUS; SUBCUTANEOUS at 03:38

## 2020-07-03 RX ADMIN — OXYBUTYNIN CHLORIDE 10 MG: 10 TABLET, EXTENDED RELEASE ORAL at 09:15

## 2020-07-03 RX ADMIN — ONDANSETRON 4 MG: 2 SOLUTION INTRAMUSCULAR; INTRAVENOUS at 07:58

## 2020-07-03 RX ADMIN — DEXTROSE AND SODIUM CHLORIDE 100 ML/HR: 5; 450 INJECTION, SOLUTION INTRAVENOUS at 09:35

## 2020-07-03 RX ADMIN — SODIUM CHLORIDE, PRESERVATIVE FREE 10 ML: 5 INJECTION INTRAVENOUS at 09:22

## 2020-07-03 RX ADMIN — LEVOTHYROXINE SODIUM 137 MCG: 25 TABLET ORAL at 06:16

## 2020-07-03 NOTE — OP NOTE
CYSTOSCOPY URETEROSCOPY RETROGRADE PYELOGRAM HOLMIUM LASER STENT INSERTION  Procedure Note    Marielle Hansen  7/2/2020    Pre-op Diagnosis:   Ureteral stone with hydronephrosis [N13.2]    Post-op Diagnosis:     Post-Op Diagnosis Codes:     * Ureteral stone with hydronephrosis [N13.2]      Procedure(s):  CYSTOSCOPY LEFT URETEROSCOPY RETROGRADE PYELOGRAM HOLMIUM LASER STENT INSERTION    Surgeon(s):  Johnny Chino MD    Anesthesia: Choice    Staff:   Circulator: Manjinder Lundy RN  Scrub Person: Shai Jones  Assistant: Shy Lockhart CSA    Estimated Blood Loss: minimal    Specimens:                ID Type Source Tests Collected by Time   1 (Not marked as sent) :  Calculus Ureter, Left STONE ANALYSIS Johnny Chino MD 7/2/2020 2102         Drains: * No LDAs found *    Findings: Proximal left ureteral stones    Complications: None    Indications: Same    Description of Procedure: Patient brought to surgery placed in dorsolithotomy position follows a sterile prep and drape genitalia routine fashion passed on 20 cystoscope in the bladder we put a retrograde catheter in the ureter left inside shot retrograde studies we met some blockage for stones that were there that flushed out with a retrograde after this we put 038 guidewire past the Steinstrasse on the left-hand side and then over the top of our through-the-scope we placed a 8 x 26 double-J stent.  Bladder was drained scope was removed and fluoroscopy showed J stent was in good position    Johnny Chino MD     Date: 7/2/2020  Time: 21:04

## 2020-07-03 NOTE — PROGRESS NOTES
LOS: 0 days     Patient Care Team:  Yoselin Rubio MD as PCP - General (Family Medicine)      Subjective     Proximal left ureteral stone    Objective       Vital Signs  Temp:  [97.8 °F (36.6 °C)-99 °F (37.2 °C)] 98.6 °F (37 °C)  Heart Rate:  [63-80] 79  Resp:  [16-20] 16  BP: (100-141)/(47-77) 141/47    Physical Exam:        General Appearance:   Comfortable     Respiratory:    UNLABORED RESPIRATIONS.     Abdomen:     SOFT.       Genitourinary:  Urine clear     Rectal:     DEFERRED       Results Review:       Imaging Results (Last 24 Hours)     ** No results found for the last 24 hours. **        Lab Results (last 24 hours)     Procedure Component Value Units Date/Time    COVID-19, [facility] IN-HOUSE, NP SWAB IN TRANSPORT MEDIA 8-10 HR TAT - Swab, Nasopharynx [[id number]]  (Normal) Collected:  07/02/20 1316    Specimen:  Swab from Nasopharynx Updated:  07/02/20 1927     COVID19 Not Detected    Narrative:       Testing performed by Real Time RT-PCR  This test has not been approved by the [employer] but is authorized under the Emergency Use Act (EUA)    Fact sheet for providers: https://www.fda.gov/media/231801/download    Fact sheet for patients: https://www.fda.gov/media/753640/download        Comprehensive Metabolic Panel [[id number]]  (Abnormal) Collected:  07/02/20 1054    Specimen:  Blood Updated:  07/02/20 1200     Glucose 91 mg/dL      BUN 33 mg/dL      Creatinine 1.96 mg/dL      Sodium 135 mmol/L      Potassium 3.8 mmol/L      Chloride 98 mmol/L      CO2 26.0 mmol/L      Calcium 8.7 mg/dL      Total Protein 6.7 g/dL      Albumin 3.60 g/dL      ALT (SGPT) 17 U/L      AST (SGOT) 28 U/L      Alkaline Phosphatase 36 U/L      Total Bilirubin 0.2 mg/dL      eGFR Non African Amer 25 mL/min/1.73      Globulin 3.1 gm/dL      A/G Ratio 1.2 g/dL      BUN/Creatinine Ratio 16.8     Anion Gap 11.0 mmol/L     Narrative:       GFR Normal >60  Chronic Kidney Disease <60  Kidney Failure <15      CBC & Differential [[id number]]  Collected:  07/02/20 1054    Specimen:  Blood Updated:  07/02/20 1136    Narrative:       The following orders were created for panel order CBC & Differential.  Procedure                               Abnormality         Status                     ---------                               -----------         ------                     CBC Auto Differential[[id number]]        Abnormal            Final result                 Please view results for these tests on the individual orders.    CBC Auto Differential [[id number]]  (Abnormal) Collected:  07/02/20 1054    Specimen:  Blood Updated:  07/02/20 1136     WBC 7.52 10*3/mm3      RBC 4.06 10*6/mm3      Hemoglobin 11.5 g/dL      Hematocrit 34.9 %      MCV 86.0 fL      MCH 28.3 pg      MCHC 33.0 g/dL      RDW 14.4 %      RDW-SD 44.8 fl      MPV 9.9 fL      Platelets 160 10*3/mm3      Neutrophil % 58.3 %      Lymphocyte % 20.9 %      Monocyte % 16.2 %      Eosinophil % 3.3 %      Basophil % 0.5 %      Immature Grans % 0.8 %      Neutrophils, Absolute 4.38 10*3/mm3      Lymphocytes, Absolute 1.57 10*3/mm3      Monocytes, Absolute 1.22 10*3/mm3      Eosinophils, Absolute 0.25 10*3/mm3      Basophils, Absolute 0.04 10*3/mm3      Immature Grans, Absolute 0.06 10*3/mm3      nRBC 0.0 /100 WBC     Extra Tubes [[id number]] Collected:  07/01/20 2025    Specimen:  Blood, Venous Line Updated:  07/01/20 2130    Narrative:       The following orders were created for panel order Extra Tubes.  Procedure                               Abnormality         Status                     ---------                               -----------         ------                     Light Blue Top[[id number]]                                   Final result               [facility][[id number]]                                   Final result                 Please view results for these tests on the individual orders.    Light Blue Top [[id number]] Collected:  07/01/20 2025    Specimen:  Blood Updated:  07/01/20  2130     Extra Tube hold for add-on     Comment: Auto resulted       Gold Top - SST [[id number]] Collected:  07/01/20 2025    Specimen:  Blood Updated:  07/01/20 2130     Extra Tube Hold for add-ons.     Comment: Auto resulted.       Comprehensive Metabolic Panel [[id number]]  (Abnormal) Collected:  07/01/20 2022    Specimen:  Blood Updated:  07/01/20 2101     Glucose 136 mg/dL      BUN 32 mg/dL      Creatinine 2.10 mg/dL      Sodium 133 mmol/L      Potassium 4.2 mmol/L      Chloride 96 mmol/L      CO2 22.0 mmol/L      Calcium 9.0 mg/dL      Total Protein 7.2 g/dL      Albumin 4.00 g/dL      ALT (SGPT) 16 U/L      AST (SGOT) 23 U/L      Alkaline Phosphatase 40 U/L      Total Bilirubin 0.4 mg/dL      eGFR Non African Amer 23 mL/min/1.73      Globulin 3.2 gm/dL      A/G Ratio 1.3 g/dL      BUN/Creatinine Ratio 15.2     Anion Gap 15.0 mmol/L     Narrative:       GFR Normal >60  Chronic Kidney Disease <60  Kidney Failure <15              I reviewed the patient's new clinical results.  I reviewed the patient's new imaging results and agree with the interpretation.  I reviewed the patient's other test results and agree with the interpretation        Assessment/Plan       Ureteral stone with hydronephrosis    CARMEN (acute kidney injury) (CMS/HCC)      Left ureteral calculi    Cystoscopy left retrograde ureteroscopy laser lithotripsy J stent risk and benefits discussed      Johnny Chino MD  07/02/20  20:33

## 2020-07-03 NOTE — PLAN OF CARE
Problem: Patient Care Overview  Goal: Plan of Care Review  Outcome: Ongoing (interventions implemented as appropriate)  Flowsheets (Taken 7/3/2020 6927)  Progress: no change  Plan of Care Reviewed With: patient  Outcome Summary: VSS; patient is in pain; pain is semi-controlled with IV medication; patient has not slept much tonight; will continue to monitor

## 2020-07-03 NOTE — PROGRESS NOTES
CYSTOSCOPY URETEROSCOPY RETROGRADE PYELOGRAM HOLMIUM LASER STENT INSERTION  Procedure Note    Marielle Hansen  7/2/2020    Pre-op Diagnosis:   Ureteral stone with hydronephrosis [N13.2]    Post-op Diagnosis:     Post-Op Diagnosis Codes:     * Ureteral stone with hydronephrosis [N13.2]      Procedure(s):  CYSTOSCOPY LEFT URETEROSCOPY RETROGRADE PYELOGRAM HOLMIUM LASER STENT INSERTION    Surgeon(s):  Johnny Chino MD    Anesthesia: Choice    Staff:   * No surgical staff found *    Estimated Blood Loss: minimal    Specimens:                None      Drains: * No LDAs found *    Findings: Proximal left ureteral stones    Complications: None    Indications: Steinstrasse    Description of Procedure: Patient brought to cystoscopy placed in dorsolithotomy position.  Sterile prep and drape the genitalia passed a #22 cystoscope into the bladder left ureter was catheterized 6 cc of contrast placed up the ureter with injection of contrast we flushed out stones and then we put an 035 Glidewire passed stones on the left-hand side then over top guidewire through cystoscope we placed a 6 x 28 double-J stent.  Bladder was drained scope was removed patient taken recovery out of procedure well    Johnny Chino MD     Date: 7/2/2020  Time: 20:32

## 2020-07-03 NOTE — ANESTHESIA PROCEDURE NOTES
Airway  Urgency: elective    Date/Time: 7/2/2020 8:48 PM    General Information and Staff    Patient location during procedure: OR  CRNA: Ovidio Wynne CRNA    Indications and Patient Condition  Indications for airway management: airway protection    Preoxygenated: yes  Mask difficulty assessment: 1 - vent by mask    Final Airway Details  Final airway type: supraglottic airway      Successful airway: I-gel  Size 4    Number of attempts at approach: 1  Assessment: lips, teeth, and gum same as pre-op and atraumatic intubation

## 2020-07-03 NOTE — DISCHARGE SUMMARY
[facility]  DISCHARGE SUMMARY       Date of Admission: 7/1/2020  Date of Discharge:  7/3/2020  Primary Care Physician: Yoselin Rubio MD    Presenting Problem/History of Present Illness:  Ureterolithiasis [N20.1]  CARMEN (acute kidney injury) (CMS/[facility]) [N17.9]  Acute cystitis with hematuria [N30.01]     Final Discharge Diagnoses:  Active Hospital Problems    Diagnosis   • **Ureteral stone with hydronephrosis     Added automatically from request for surgery [id number]     • CARMEN (acute kidney injury) (CMS/[facility])       Consults:   Consults     Date and Time Order Name Status Description    7/2/2020 0351 Inpatient Urology Consult Completed     7/1/2020 2110 Hospitalist (on-call MD unless specified)      7/1/2020 2110 Urology (on-call MD unless specified) Completed           Procedures Performed: Procedure(s):  CYSTOSCOPY LEFT URETEROSCOPY RETROGRADE PYELOGRAM STENT INSERTION                Pertinent Test Results:   Lab Results (last 24 hours)     Procedure Component Value Units Date/Time    Urine Culture - Urine, Urine, Clean Catch [[id number]] Collected:  07/01/20 2008    Specimen:  Urine, Clean Catch Updated:  07/03/20 1021     Urine Culture >100,000 CFU/mL Mixed Gio Isolated    Narrative:       Specimen contains mixed organisms of questionable pathogenicity which indicates contamination with commensal gio.  Further identification is unlikely to provide clinically useful information.  Suggest recollection.    Comprehensive Metabolic Panel [[id number]]  (Abnormal) Collected:  07/03/20 0606    Specimen:  Blood Updated:  07/03/20 0655     Glucose 141 mg/dL      BUN 31 mg/dL      Creatinine 1.56 mg/dL      Sodium 135 mmol/L      Potassium 4.0 mmol/L      Chloride 99 mmol/L      CO2 25.0 mmol/L      Calcium 8.7 mg/dL      Total Protein 7.0 g/dL      Albumin 3.90 g/dL      ALT (SGPT) 23 U/L      AST (SGOT) 28 U/L      Alkaline Phosphatase 41 U/L      Total Bilirubin  0.2 mg/dL      eGFR Non African Amer 33 mL/min/1.73      Globulin 3.1 gm/dL      A/G Ratio 1.3 g/dL      BUN/Creatinine Ratio 19.9     Anion Gap 11.0 mmol/L     Narrative:       GFR Normal >60  Chronic Kidney Disease <60  Kidney Failure <15      CBC & Differential [[id number]] Collected:  07/03/20 0606    Specimen:  Blood Updated:  07/03/20 0637    Narrative:       The following orders were created for panel order CBC & Differential.  Procedure                               Abnormality         Status                     ---------                               -----------         ------                     CBC Auto Differential[[id number]]        Abnormal            Final result                 Please view results for these tests on the individual orders.    CBC Auto Differential [[id number]]  (Abnormal) Collected:  07/03/20 0606    Specimen:  Blood Updated:  07/03/20 0637     WBC 8.90 10*3/mm3      RBC 4.20 10*6/mm3      Hemoglobin 11.8 g/dL      Hematocrit 36.7 %      MCV 87.4 fL      MCH 28.1 pg      MCHC 32.2 g/dL      RDW 14.1 %      RDW-SD 45.2 fl      MPV 9.8 fL      Platelets 204 10*3/mm3      Neutrophil % 76.3 %      Lymphocyte % 16.9 %      Monocyte % 4.8 %      Eosinophil % 0.1 %      Basophil % 0.3 %      Immature Grans % 1.6 %      Neutrophils, Absolute 6.79 10*3/mm3      Lymphocytes, Absolute 1.50 10*3/mm3      Monocytes, Absolute 0.43 10*3/mm3      Eosinophils, Absolute 0.01 10*3/mm3      Basophils, Absolute 0.03 10*3/mm3      Immature Grans, Absolute 0.14 10*3/mm3      nRBC 0.0 /100 WBC     STONE ANALYSIS - Calculus, Ureter, Left [[id number]] Collected:  07/02/20 2102    Specimen:  Calculus from Ureter, Left Updated:  07/02/20 2219    COVID-19, [facility] IN-HOUSE, NP SWAB IN TRANSPORT MEDIA 8-10 HR TAT - Swab, Nasopharynx [[id number]]  (Normal) Collected:  07/02/20 1316    Specimen:  Swab from Nasopharynx Updated:  07/02/20 1927     COVID19 Not Detected    Narrative:       Testing performed by Real Time  "RT-PCR  This test has not been approved by the [employer] but is authorized under the Emergency Use Act (EUA)    Fact sheet for providers: https://www.fda.gov/media/401728/download    Fact sheet for patients: https://www.fda.gov/media/498522/download            Imaging Results (Last 24 Hours)     Procedure Component Value Units Date/Time    FL Retrograde Pyelogram In OR [[id number]] Resulted:  07/03/20 0620     Updated:  07/03/20 0620        Chief Complaint on Day of Discharge: No complaints    Hospital Course:    This is a 70-year-old  female with past medical history of 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     The patient recently underwent lithotripsy on 6/25/2020 by Dr. Chino for a 1 cm left renal stone.  Patient states about 3 days ago her left flank pain worsened and was not relieved by Percocet or Toradol.       CT of the abdomen and pelvis indicates left kidney mild hydronephrosis secondary to proximal left ureter 4 separate ureter calculi, with the largest measuring up to 3.9 mm in greatest width.     Dr. Chino was consulted and the patient underwent cystoscopy with stent placement.  The patient states she has Toradol and Percocet at home for pain control.  Phenergan was refilled.  The patient is given a prescription for Omnicef to complete antibiotic coverage for a urinary tract infection.      Follow up with Dr. Chino and PCP in one week.      Condition on Discharge:  Stable    Physical Exam on Discharge:  /76 (BP Location: Right arm, Patient Position: Sitting)   Pulse 60   Temp 98 °F (36.7 °C) (Oral)   Resp 18   Ht 172.7 cm (68\")   Wt 99 kg (218 lb 3.2 oz)   SpO2 95%   BMI 33.18 kg/m²   Physical Exam   Constitutional: She is oriented to person, place, and time. She appears well-developed and well-nourished.   HENT:   Head: " Normocephalic and atraumatic.   Eyes: Pupils are equal, round, and reactive to light. EOM are normal.   Neck: Normal range of motion. Neck supple.   Cardiovascular: Normal rate and regular rhythm.   Pulmonary/Chest: Effort normal and breath sounds normal.   Abdominal: Soft. Bowel sounds are normal.   Musculoskeletal: Normal range of motion.   Neurological: She is alert and oriented to person, place, and time.   Skin: Skin is warm and dry.   Psychiatric: She has a normal mood and affect. Her behavior is normal.     Discharge Disposition:  Home or Self Care    Discharge Medications:     Discharge Medications      New Medications      Instructions Start Date   cefdinir 300 MG capsule  Commonly known as:  OMNICEF   300 mg, Oral, 2 Times Daily         Continue These Medications      Instructions Start Date   albuterol sulfate  (90 Base) MCG/ACT inhaler  Commonly known as:  PROVENTIL HFA;VENTOLIN HFA;PROAIR HFA   2 puffs, Inhalation, Every 4 Hours PRN      amLODIPine 5 MG tablet  Commonly known as:  NORVASC   5 mg, Oral, Daily      cyanocobalamin 1000 MCG/ML injection   1,000 mcg, Intramuscular, Every 30 Days      Drysol 20 % external solution  Generic drug:  aluminum chloride   Daily      escitalopram 10 MG tablet  Commonly known as:  LEXAPRO   10 mg, Oral, Daily      ketorolac 10 MG tablet  Commonly known as:  TORADOL   10 mg, Oral, 2 Times Daily PRN      levothyroxine 137 MCG tablet  Commonly known as:  SYNTHROID, LEVOTHROID   137 mcg, Oral, Daily      lovastatin 40 MG tablet  Commonly known as:  MEVACOR   40 mg, Oral, Daily      metoprolol succinate  MG 24 hr tablet  Commonly known as:  TOPROL-XL   100 mg, Oral, Daily      nitrofurantoin 100 MG capsule  Commonly known as:  MACRODANTIN   100 mg, Oral, Daily      olmesartan-hydrochlorothiazide 40-12.5 MG per tablet  Commonly known as:  Benicar HCT   1 tablet, Oral, Daily      oxybutynin XL 10 MG 24 hr tablet  Commonly known as:  DITROPAN-XL   10 mg, Oral,  "Daily      oxyCODONE-acetaminophen 7.5-325 MG per tablet  Commonly known as:  PERCOCET   1-2 tablets, Oral, Every 4 Hours PRN      promethazine 12.5 MG tablet  Commonly known as:  PHENERGAN   12.5 mg, Oral, Every 6 Hours PRN      Syringe 22G X 1\" 3 ML misc   Use to inject B-12 injection into appropriate muscle      Vitamin D3 1.25 MG (29928 UT) capsule   50,000 Units, Oral, 2 Times Weekly         Stop These Medications    levoFLOXacin 250 MG tablet  Commonly known as:  Levaquin            Discharge Diet:   Diet Instructions     Diet: Regular      Discharge Diet:  Regular          Activity at Discharge:   Activity Instructions     Activity as Tolerated            Discharge Care Plan/Instructions: As above.     Follow-up Appointment:  Follow-up Information     Yoselin Rubio MD Follow up.    Specialties:  Family Medicine, Emergency Medicine  Contact information:  [address] [place] [address] [address] [place] [address] [phone]             Avery, Johnny WOLFF MD Follow up in 1 week(s).    Specialty:  Urology  Contact information:  [address] [place] [address] [place]  [facility] [address]  [facility] [address] [phone]                   Test Results Pending at Discharge:    Order Current Status    STONE ANALYSIS - Calculus, Ureter, Left In process            This document has been electronically signed by BIBI Cody on July 3, 2020 14:40        Time: Greater than 30 minutes.               "

## 2020-07-03 NOTE — ANESTHESIA POSTPROCEDURE EVALUATION
Patient: Marielle Hansen    Procedure Summary     Date:  07/02/20 Room / Location:  [facility] OR 02 / [facility] OR    Anesthesia Start:  2041 Anesthesia Stop:  2111    Procedure:  CYSTOSCOPY LEFT URETEROSCOPY RETROGRADE PYELOGRAM STENT INSERTION (Left ) Diagnosis:       Ureteral stone with hydronephrosis      (Ureteral stone with hydronephrosis [N13.2])    Surgeon:  Johnny Chino MD Provider:  Pedro David MD    Anesthesia Type:  general ASA Status:  3          Anesthesia Type: general    Vitals  No vitals data found for the desired time range.          Post Anesthesia Care and Evaluation    Patient location during evaluation: PACU  Patient participation: complete - patient participated  Level of consciousness: awake and alert  Pain score: 0  Pain management: adequate  Airway patency: patent  Anesthetic complications: No anesthetic complications  PONV Status: none  Cardiovascular status: acceptable  Respiratory status: acceptable and spontaneous ventilation  Hydration status: acceptable

## 2020-07-04 NOTE — OUTREACH NOTE
Prep Survey      Responses   [facility] facility patient discharged from?  [place]   Is LACE score < 7 ?  Yes   Eligibility  [facility]   Date of Admission  07/01/20   Date of Discharge  07/03/20   Discharge diagnosis  Cystoscopy with stent insertion this visit   COVID-19 Test Status  Negative   Does the patient have one of the following disease processes/diagnoses(primary or secondary)?  Other   Does the patient have Home health ordered?  No   Is there a DME ordered?  No   Prep survey completed?  Yes          Olimpia Riojas RN

## 2020-07-06 ENCOUNTER — TRANSITIONAL CARE MANAGEMENT TELEPHONE ENCOUNTER (OUTPATIENT)
Dept: CALL CENTER | Facility: HOSPITAL | Age: 70
End: 2020-07-06

## 2020-07-06 NOTE — OUTREACH NOTE
Call Center TCM Note      Responses   [facility] patient discharged from?  [place]   COVID-19 Test Status  Negative   Does the patient have one of the following disease processes/diagnoses(primary or secondary)?  Other   TCM attempt successful?  No   Unsuccessful attempts  Attempt 1   Call Status  Voice mail issues [No VM]          Ritu Corea MA    7/6/2020, 15:02

## 2020-07-06 NOTE — OUTREACH NOTE
Call Center TCM Note      Responses   [facility] patient discharged from?  [place]   COVID-19 Test Status  Negative   Does the patient have one of the following disease processes/diagnoses(primary or secondary)?  Other   TCM attempt successful?  No   Unsuccessful attempts  Attempt 2          Ritu Corea MA    7/6/2020, 16:39

## 2020-07-07 ENCOUNTER — TRANSITIONAL CARE MANAGEMENT TELEPHONE ENCOUNTER (OUTPATIENT)
Dept: CALL CENTER | Facility: HOSPITAL | Age: 70
End: 2020-07-07

## 2020-07-07 NOTE — OUTREACH NOTE
Call Center TCM Note      Responses   [facility] [place] patient discharged from?  [place]   COVID-19 Test Status  Negative   Does the patient have one of the following disease processes/diagnoses(primary or secondary)?  Other   TCM attempt successful?  No   Unsuccessful attempts  Attempt 3          Ritu Corea MA    7/7/2020, 13:55

## 2020-07-08 ENCOUNTER — TELEMEDICINE (OUTPATIENT)
Dept: FAMILY MEDICINE CLINIC | Facility: CLINIC | Age: 70
End: 2020-07-08

## 2020-07-08 DIAGNOSIS — Z53.21 PATIENT LEFT WITHOUT BEING SEEN: Primary | ICD-10-CM

## 2020-07-08 NOTE — PROGRESS NOTES
Subjective   Marielle Hansen is a 70 y.o. female.     History of Present Illness    The following portions of the patient's history were reviewed and updated as appropriate: allergies, current medications, past family history, past medical history, past social history, past surgical history and problem list.    Review of Systems    Objective   Physical Exam    Assessment/Plan   There are no diagnoses linked to this encounter.

## 2020-07-08 NOTE — PROGRESS NOTES
Manny Hansen is a 70 y.o. female.     History of Present Illness    The following portions of the patient's history were reviewed and updated as appropriate: allergies, current medications, past family history, past medical history, past social history, past surgical history and problem list.    Review of Systems   Constitutional: Negative for chills and fever.   Respiratory: Negative.    Cardiovascular: Negative.    Gastrointestinal: Negative for nausea and vomiting.   Genitourinary: Negative for hematuria and vaginal bleeding.   Musculoskeletal: Positive for arthralgias, back pain and gait problem.   Skin: Negative.    Allergic/Immunologic: Negative for immunocompromised state.   Neurological: Negative for dizziness, tremors, seizures, syncope, weakness, light-headedness and numbness.   Hematological: Negative.    Psychiatric/Behavioral: Positive for sleep disturbance. Negative for agitation and confusion. The patient is nervous/anxious.    All other systems reviewed and are negative.      Objective   Physical Exam    Assessment/Plan   There are no diagnoses linked to this encounter.

## 2020-07-16 LAB
CAHPO4 CRY STONE QL IR: 30 %
CARBONATE APATITE: 70 %
COLOR STONE: NORMAL
COMPN STONE: NORMAL
LABORATORY COMMENT REPORT: NORMAL
Lab: NORMAL
Lab: NORMAL
PHOTO: NORMAL
SIZE STONE: NORMAL MM
SPECIMEN SOURCE: NORMAL
WT STONE: 1 MG

## 2020-07-21 RX ORDER — METOPROLOL SUCCINATE 100 MG/1
100 TABLET, EXTENDED RELEASE ORAL DAILY
Qty: 30 TABLET | Refills: 5 | Status: SHIPPED | OUTPATIENT
Start: 2020-07-21 | End: 2021-06-01

## 2020-07-21 RX ORDER — CHOLECALCIFEROL (VITAMIN D3) 1250 MCG
50000 CAPSULE ORAL 2 TIMES WEEKLY
Qty: 8 CAPSULE | Refills: 5 | Status: SHIPPED | OUTPATIENT
Start: 2020-07-23 | End: 2020-12-01 | Stop reason: SDUPTHER

## 2020-08-11 ENCOUNTER — OFFICE VISIT (OUTPATIENT)
Dept: FAMILY MEDICINE CLINIC | Facility: CLINIC | Age: 70
End: 2020-08-11

## 2020-08-11 VITALS — BODY MASS INDEX: 33.18 KG/M2 | HEIGHT: 68 IN

## 2020-08-11 DIAGNOSIS — G47.00 INSOMNIA, UNSPECIFIED TYPE: Primary | ICD-10-CM

## 2020-08-11 DIAGNOSIS — R30.0 DYSURIA: ICD-10-CM

## 2020-08-11 PROCEDURE — G2025 DIS SITE TELE SVCS RHC/FQHC: HCPCS | Performed by: FAMILY MEDICINE

## 2020-08-11 RX ORDER — TRAZODONE HYDROCHLORIDE 100 MG/1
100 TABLET ORAL NIGHTLY
Qty: 30 TABLET | Refills: 5 | Status: SHIPPED | OUTPATIENT
Start: 2020-08-11 | End: 2020-12-08 | Stop reason: SDUPTHER

## 2020-08-11 RX ORDER — ONDANSETRON 4 MG/1
4 TABLET, ORALLY DISINTEGRATING ORAL EVERY 8 HOURS PRN
Qty: 30 TABLET | Refills: 5 | Status: SHIPPED | OUTPATIENT
Start: 2020-08-11 | End: 2021-03-16

## 2020-08-11 NOTE — PROGRESS NOTES
Subjective   Marielle Hansen is a 70 y.o. female.   This visit has been rescheduled as a phone visit to comply with patient safety concerns in accordance with CDC recommendations. Total time of discussion was 15 minutes.    You have chosen to receive care through a telephone visit. Do you consent to use a telephone visit for your medical care today? Yes  Patient having dysuria and she wonders if she has a UTI or another kidney stone.  She is actually seeing her urologist tomorrow for follow-up.  She has nausea with the urinary symptoms would like something for this.  She is again having trouble sleeping and would like to go back on trazodone.  She stopped taking it for a while when she was not having problems but feels that she needs to be back on it.  History of Present Illness  Insomnia   This is a chronic problem. The current episode started more than 1 year ago. The problem occurs daily. The problem has been unchanged. Associated symptoms include fatigue. Pertinent negatives include no abdominal pain, anorexia, arthralgias, change in bowel habit, chest pain, chills, congestion, coughing, diaphoresis, fever, headaches, joint swelling, myalgias, nausea, neck pain, numbness, rash, sore throat, swollen glands, urinary symptoms, vertigo, visual change, vomiting or weakness. The symptoms are aggravated by exertion and stress. Treatments tried: OTC sleep aids. The treatment provided no relief.     The following portions of the patient's history were reviewed and updated as appropriate: allergies, current medications, past family history, past medical history, past social history, past surgical history and problem list.    Review of Systems   Constitutional: Negative for chills and fever.   Respiratory: Negative.    Cardiovascular: Negative.    Gastrointestinal: Negative for nausea and vomiting.   Genitourinary: Positive for dysuria. Negative for hematuria and vaginal bleeding.   Musculoskeletal: Positive for  arthralgias, back pain and gait problem.   Skin: Negative.    Allergic/Immunologic: Negative for immunocompromised state.   Neurological: Negative for dizziness, tremors, seizures, syncope, weakness, light-headedness and numbness.   Hematological: Negative.    Psychiatric/Behavioral: Positive for sleep disturbance. Negative for agitation and confusion. The patient is nervous/anxious.    All other systems reviewed and are negative.      Objective   Physical Exam    Assessment/Plan   Marielle was seen today for follow-up.    Diagnoses and all orders for this visit:    Insomnia, unspecified type    Dysuria    Other orders  -     traZODone (DESYREL) 100 MG tablet; Take 1 tablet by mouth Every Night.  -     ondansetron ODT (Zofran ODT) 4 MG disintegrating tablet; Place 1 tablet on the tongue Every 8 (Eight) Hours As Needed for Nausea or Vomiting.    She will follow-up with her urologist tomorrow regarding the urinary symptoms and I will give Zofran for nausea until then    We will put her back on the trazodone at night to help her sleep    Follow-up with me in 6 months or sooner for problems        This document has been electronically signed by Yoselin Rubio MD on August 11, 2020 15:09

## 2020-09-23 ENCOUNTER — OFFICE VISIT (OUTPATIENT)
Dept: FAMILY MEDICINE CLINIC | Facility: CLINIC | Age: 70
End: 2020-09-23

## 2020-09-23 DIAGNOSIS — R20.0 NUMBNESS OF FINGERS: ICD-10-CM

## 2020-09-23 DIAGNOSIS — M54.2 NECK PAIN: Primary | ICD-10-CM

## 2020-09-23 PROCEDURE — 99214 OFFICE O/P EST MOD 30 MIN: CPT | Performed by: FAMILY MEDICINE

## 2020-09-23 RX ORDER — GABAPENTIN 300 MG/1
300 CAPSULE ORAL 3 TIMES DAILY
Qty: 90 CAPSULE | Refills: 2 | Status: SHIPPED | OUTPATIENT
Start: 2020-09-23 | End: 2020-11-05 | Stop reason: SDUPTHER

## 2020-09-23 NOTE — PROGRESS NOTES
Subjective   Marielle Hansen is a 70 y.o. female.   Here today for a couple of concerns.  She has been having headaches that seem to, in the back of her neck on the left side and up toward the base of the skull.  She also notices numbness and tingling in the fingers of her left hand mostly at the fingertips.  She says it is all the fingers and that it is all the time.  It does not come and go and it is not worse at night.  The headache pain is triggered with movement of her head.    History of Present Illness    The following portions of the patient's history were reviewed and updated as appropriate: allergies, current medications, past family history, past medical history, past social history, past surgical history and problem list.    Review of Systems   Constitutional: Negative for chills and fever.   Respiratory: Negative.    Cardiovascular: Negative.    Gastrointestinal: Negative for nausea and vomiting.   Genitourinary: Positive for dysuria. Negative for hematuria and vaginal bleeding.   Musculoskeletal: Positive for arthralgias, back pain and gait problem.   Skin: Negative.    Allergic/Immunologic: Negative for immunocompromised state.   Neurological: Negative for dizziness, tremors, seizures, syncope, weakness, light-headedness and numbness.   Hematological: Negative.    Psychiatric/Behavioral: Positive for sleep disturbance. Negative for agitation and confusion. The patient is nervous/anxious.    All other systems reviewed and are negative.      Objective   Physical Exam  Vitals signs and nursing note reviewed.   Constitutional:       Appearance: She is well-developed.   HENT:      Head: Normocephalic and atraumatic.      Nose: Nose normal.   Eyes:      Conjunctiva/sclera: Conjunctivae normal.      Pupils: Pupils are equal, round, and reactive to light.   Neck:      Musculoskeletal: Normal range of motion and neck supple.      Thyroid: No thyromegaly.      Vascular: No JVD.      Trachea: No tracheal  deviation.   Cardiovascular:      Rate and Rhythm: Normal rate and regular rhythm.      Heart sounds: Normal heart sounds. No murmur.   Pulmonary:      Effort: Pulmonary effort is normal.      Breath sounds: Normal breath sounds. No wheezing.   Abdominal:      General: Bowel sounds are normal. There is no distension.      Palpations: Abdomen is soft.      Tenderness: There is no abdominal tenderness.   Musculoskeletal: Normal range of motion.   Lymphadenopathy:      Cervical: No cervical adenopathy.   Skin:     General: Skin is warm and dry.      Findings: No rash.   Neurological:      Mental Status: She is alert and oriented to person, place, and time.      Coordination: Coordination normal.       Study Result    EXAM: XR CERVICAL SPINE 2 - 3 VIEWS     COMPARISONS: None     INDICATION: pain in left posterior neck, numbness in fingers on  left, M54.2 Cervicalgia     FINDINGS:  C7 is obscured from visualization on lateral view due to patient  body anatomy.     No acute fracture or subluxation.     Dens is intact. Lateral masses are symmetric.     Vertebral body heights and disc spaces are grossly preserved.     There is facet and uncovertebral arthrosis throughout the  cervical spine, most significant at C4/5 and C5/6.     Prevertebral soft tissues are within normal limits.     Lung apices are clear.     IMPRESSION:  No acute osseous abnormality.     Multilevel spondylosis and degenerative disc disease of the  cervical spine.     Electronically signed by:  Yasmin De Jesus MD  9/23/2020  11:21 AM CDT Workstation: [id number]       Assessment/Plan   Marielle was seen today for headache.    Diagnoses and all orders for this visit:    Neck pain  -     XR Spine Cervical 2 or 3 View  -     MRI Cervical Spine Without Contrast; Future  -     gabapentin (NEURONTIN) 300 MG capsule; Take 1 capsule by mouth 3 (Three) Times a Day.    Numbness of fingers    The patient has read and signed the [employer] Controlled Substance  Contract.  I will continue to see patient for regular follow up appointments. Patient is well controlled on the medication.  MARY has been reviewed by me and is updated every 3 months. The patient is aware of the potential for addiction and dependence.     X-ray with above findings.      We will get an MRI of the cervical spine given that she is having the numbness of the fingers and that has gone on for several weeks.  We will add gabapentin for pain, suspected neuropathic pain, and follow-up with me after the MRI.          This document has been electronically signed by Yoselin Rubio MD on September 23, 2020 16:49 CDT

## 2020-09-24 NOTE — PROGRESS NOTES
Please call the patient regarding her abnormal result.  She does have arthritis in the spine with some degenerative disks.  This can certainly cause the pain she is had

## 2020-10-06 RX ORDER — ESCITALOPRAM OXALATE 10 MG/1
10 TABLET ORAL DAILY
Qty: 30 TABLET | Refills: 5 | Status: SHIPPED | OUTPATIENT
Start: 2020-10-06 | End: 2020-10-06 | Stop reason: SDUPTHER

## 2020-10-06 RX ORDER — ESCITALOPRAM OXALATE 10 MG/1
10 TABLET ORAL DAILY
Qty: 30 TABLET | Refills: 5 | Status: SHIPPED | OUTPATIENT
Start: 2020-10-06 | End: 2021-09-08 | Stop reason: SDUPTHER

## 2020-10-08 ENCOUNTER — HOSPITAL ENCOUNTER (OUTPATIENT)
Dept: MRI IMAGING | Facility: HOSPITAL | Age: 70
Discharge: HOME | End: 2020-10-08
Admitting: FAMILY MEDICINE

## 2020-10-08 DIAGNOSIS — M54.2 NECK PAIN: ICD-10-CM

## 2020-10-08 PROCEDURE — 72141 MRI NECK SPINE W/O DYE: CPT

## 2020-10-20 ENCOUNTER — OFFICE VISIT (OUTPATIENT)
Dept: FAMILY MEDICINE CLINIC | Facility: CLINIC | Age: 70
End: 2020-10-20

## 2020-10-20 DIAGNOSIS — M50.90 CERVICAL DISC DISEASE: Primary | ICD-10-CM

## 2020-10-20 PROCEDURE — G2025 DIS SITE TELE SVCS RHC/FQHC: HCPCS | Performed by: FAMILY MEDICINE

## 2020-10-20 NOTE — PROGRESS NOTES
Subjective   Marielle Hansen is a 70 y.o. female.   This visit has been rescheduled as a phone visit to comply with patient safety concerns in accordance with CDC recommendations. Total time of discussion was 15 minutes.  You have chosen to receive care through a telephone visit. Do you consent to use a telephone visit for your medical care today? Yes  Patient following up today after an MRI of her C-spine.  She continues to have symptoms including weakness of her left hand which is also numb.  She cannot even grasp her computer tablet.  She says that the left arm and hand tingle and sting.  She also has pain in the Upper back of the neck and at the base of the head on left    This is consistent with MRI findings, as multilevel spondyloarthropathy.  She does have disc osteophyte complexes at multiple levels but it is noted particularly at C4-5 that she has severe narrowing of the spinal canal effacing the spinal cord.      History of Present Illness    The following portions of the patient's history were reviewed and updated as appropriate: allergies, current medications, past family history, past medical history, past social history, past surgical history and problem list.    Review of Systems   Constitutional: Negative for chills and fever.   Respiratory: Negative.    Cardiovascular: Negative.    Gastrointestinal: Negative for nausea and vomiting.   Genitourinary: Positive for dysuria. Negative for hematuria and vaginal bleeding.   Musculoskeletal: Positive for arthralgias, back pain and gait problem.   Skin: Negative.    Allergic/Immunologic: Negative for immunocompromised state.   Neurological: Negative for dizziness, tremors, seizures, syncope, weakness, light-headedness and numbness.   Hematological: Negative.    Psychiatric/Behavioral: Positive for sleep disturbance. Negative for agitation and confusion. The patient is nervous/anxious.    All other systems reviewed and are negative.      Objective    Physical Exam  Appointment Information     Radiology Images   Study Result    PROCEDURE: MRI CERVICAL SPINE WO CONTRAST     HISTORY: Left-sided neck pain, numbness in left hand     TECHNIQUE:  MRI of the cervical spine without gadolinium utilizing  multiplanar and multisequential imaging.     COMPARISON: X-ray cervical spine dated 9/23/2020     FINDINGS:  There is normal cervical vertebral height. There is normal bone  marrow signal. The disc spaces are at all levels, with the  exception of C2-C3. This is most prominent at C4-C5. There is  disc desiccation noted diffusely. The bony spinal canal is  developmentally normal in size. The spinal cord demonstrates  normal signal on all pulse sequences. The cerebellar tonsils are  positioned at a normal level.           C2-C3: Bilateral facet arthropathy, left greater than right,  produces mild bilateral neural foraminal narrowing. The central  spinal canal is widely patent     C3-C4: There is no significant spondyloarthropathy. The bilateral  subarticular recess region and the bilateral neural foramina are  patent   without narrowing.     C4-C5: Large disc osteophyte complex severely narrows the central  spinal canal and produces mild effacement of the spinal cord.  Mild bilateral neural foraminal narrowing is also noted     C5-C6: Disc osteophyte complex severely narrows the central  spinal canal and mildly effaces the spinal cord. Mild bilateral  neural foraminal narrowing is seen at this level     C6-C7: Small disc osteophyte complex which is asymmetric to the  left is present, mildly narrowing the left neural foramen and  minimally narrowing the central spinal canal.     C7-T1:There is no significant spondyloarthropathy. The bilateral  subarticular recess region and the bilateral neural foramina are  patent   without narrowing.     IMPRESSION:  Multilevel spondyloarthropathy as described above  that may correlate with clinically significant relationships  and  clinical physical exam findings. Please see the segmented  detailed report above for specific level analysis.     Electronically signed by:  Mitzi Chang MD  10/8/2020 7:13 PM CDT  Workstation: [id number]       Assessment/Plan   Diagnoses and all orders for this visit:    1. Cervical disc disease (Primary)  -     Cancel: Ambulatory Referral to Neurosurgery  -     Ambulatory Referral to Neurosurgery    Will refer to neurosurgery to discuss options given she has weakness in the right arm and hand and it is affecting her daily function.  She has Percocet, gabapentin to take for pain.        This document has been electronically signed by Yoselin Rubio MD on October 20, 2020 16:15 CDT

## 2020-11-04 RX ORDER — LOVASTATIN 40 MG/1
40 TABLET ORAL DAILY
Qty: 30 TABLET | Refills: 5 | Status: SHIPPED | OUTPATIENT
Start: 2020-11-04 | End: 2021-06-01

## 2020-11-04 RX ORDER — LEVOTHYROXINE SODIUM 137 UG/1
137 TABLET ORAL DAILY
Qty: 30 TABLET | Refills: 5 | Status: SHIPPED | OUTPATIENT
Start: 2020-11-04 | End: 2021-04-20 | Stop reason: SDUPTHER

## 2020-11-05 DIAGNOSIS — M54.2 NECK PAIN: ICD-10-CM

## 2020-11-05 RX ORDER — GABAPENTIN 300 MG/1
300 CAPSULE ORAL 3 TIMES DAILY
Qty: 90 CAPSULE | Refills: 2 | Status: SHIPPED | OUTPATIENT
Start: 2020-11-05 | End: 2020-11-09 | Stop reason: SDUPTHER

## 2020-11-09 DIAGNOSIS — M54.2 NECK PAIN: ICD-10-CM

## 2020-11-09 RX ORDER — GABAPENTIN 300 MG/1
300 CAPSULE ORAL 3 TIMES DAILY
Qty: 90 CAPSULE | Refills: 0 | Status: SHIPPED | OUTPATIENT
Start: 2020-11-09 | End: 2020-11-17 | Stop reason: SDUPTHER

## 2020-11-09 NOTE — TELEPHONE ENCOUNTER
----- Message from Kathie Cutler sent at 11/9/2020  1:53 PM CST -----  REFILLS ON GABAPENTIN.. [facility]

## 2020-11-17 DIAGNOSIS — M54.2 NECK PAIN: ICD-10-CM

## 2020-11-17 RX ORDER — GABAPENTIN 300 MG/1
300 CAPSULE ORAL 3 TIMES DAILY
Qty: 90 CAPSULE | Refills: 0 | Status: SHIPPED | OUTPATIENT
Start: 2020-11-17 | End: 2021-01-04 | Stop reason: SDUPTHER

## 2020-12-01 RX ORDER — CHOLECALCIFEROL (VITAMIN D3) 1250 MCG
50000 CAPSULE ORAL 2 TIMES WEEKLY
Qty: 8 CAPSULE | Refills: 5 | Status: SHIPPED | OUTPATIENT
Start: 2020-12-03 | End: 2020-12-08 | Stop reason: SDUPTHER

## 2020-12-01 RX ORDER — CYANOCOBALAMIN 1000 UG/ML
1000 INJECTION, SOLUTION INTRAMUSCULAR; SUBCUTANEOUS
Qty: 1 ML | Refills: 5 | Status: SHIPPED | OUTPATIENT
Start: 2020-12-01 | End: 2021-09-08

## 2020-12-01 RX ORDER — SYRINGE W-NEEDLE,DISPOSAB,3 ML 25GX5/8"
SYRINGE, EMPTY DISPOSABLE MISCELLANEOUS
Qty: 1 EACH | Refills: 3 | Status: SHIPPED | OUTPATIENT
Start: 2020-12-01 | End: 2020-12-01 | Stop reason: SDUPTHER

## 2020-12-01 RX ORDER — CYANOCOBALAMIN 1000 UG/ML
1000 INJECTION, SOLUTION INTRAMUSCULAR; SUBCUTANEOUS
Qty: 1 ML | Refills: 5 | Status: SHIPPED | OUTPATIENT
Start: 2020-12-01 | End: 2020-12-01 | Stop reason: SDUPTHER

## 2020-12-01 RX ORDER — SYRINGE W-NEEDLE,DISPOSAB,3 ML 25GX5/8"
SYRINGE, EMPTY DISPOSABLE MISCELLANEOUS
Qty: 1 EACH | Refills: 5 | Status: SHIPPED | OUTPATIENT
Start: 2020-12-01 | End: 2022-01-03 | Stop reason: SDUPTHER

## 2020-12-04 DIAGNOSIS — I10 ESSENTIAL HYPERTENSION: ICD-10-CM

## 2020-12-04 RX ORDER — AMLODIPINE BESYLATE 5 MG/1
TABLET ORAL
Qty: 30 TABLET | Refills: 5 | Status: SHIPPED | OUTPATIENT
Start: 2020-12-04 | End: 2021-01-04 | Stop reason: SDUPTHER

## 2020-12-08 RX ORDER — CHOLECALCIFEROL (VITAMIN D3) 1250 MCG
50000 CAPSULE ORAL 2 TIMES WEEKLY
Qty: 8 CAPSULE | Refills: 5 | Status: SHIPPED | OUTPATIENT
Start: 2020-12-10 | End: 2021-01-04 | Stop reason: SDUPTHER

## 2020-12-08 RX ORDER — TRAZODONE HYDROCHLORIDE 100 MG/1
100 TABLET ORAL NIGHTLY
Qty: 30 TABLET | Refills: 5 | Status: SHIPPED | OUTPATIENT
Start: 2020-12-08 | End: 2021-06-01

## 2021-01-04 DIAGNOSIS — M54.2 NECK PAIN: ICD-10-CM

## 2021-01-04 DIAGNOSIS — I10 ESSENTIAL HYPERTENSION: ICD-10-CM

## 2021-01-04 RX ORDER — CHOLECALCIFEROL (VITAMIN D3) 1250 MCG
50000 CAPSULE ORAL 2 TIMES WEEKLY
Qty: 8 CAPSULE | Refills: 5 | Status: SHIPPED | OUTPATIENT
Start: 2021-01-04 | End: 2021-06-09 | Stop reason: SDUPTHER

## 2021-01-04 RX ORDER — GABAPENTIN 300 MG/1
300 CAPSULE ORAL 3 TIMES DAILY
Qty: 90 CAPSULE | Refills: 0 | Status: SHIPPED | OUTPATIENT
Start: 2021-01-04 | End: 2021-02-04

## 2021-01-04 RX ORDER — OLMESARTAN MEDOXOMIL AND HYDROCHLOROTHIAZIDE 40/12.5 40; 12.5 MG/1; MG/1
1 TABLET ORAL DAILY
Qty: 90 TABLET | Refills: 1 | Status: SHIPPED | OUTPATIENT
Start: 2021-01-04 | End: 2021-08-02

## 2021-01-04 RX ORDER — AMLODIPINE BESYLATE 5 MG/1
5 TABLET ORAL DAILY
Qty: 30 TABLET | Refills: 5 | Status: SHIPPED | OUTPATIENT
Start: 2021-01-04 | End: 2021-08-02

## 2021-02-03 DIAGNOSIS — M54.2 NECK PAIN: ICD-10-CM

## 2021-02-04 RX ORDER — GABAPENTIN 300 MG/1
CAPSULE ORAL
Qty: 90 CAPSULE | Refills: 0 | Status: SHIPPED | OUTPATIENT
Start: 2021-02-04 | End: 2021-04-07 | Stop reason: SDUPTHER

## 2021-03-18 ENCOUNTER — PRIOR AUTHORIZATION (OUTPATIENT)
Dept: FAMILY MEDICINE CLINIC | Facility: CLINIC | Age: 71
End: 2021-03-18

## 2021-03-18 ENCOUNTER — OFFICE VISIT (OUTPATIENT)
Dept: FAMILY MEDICINE CLINIC | Facility: CLINIC | Age: 71
End: 2021-03-18

## 2021-03-18 ENCOUNTER — LAB (OUTPATIENT)
Dept: LAB | Facility: OTHER | Age: 71
End: 2021-03-18

## 2021-03-18 DIAGNOSIS — N18.31 STAGE 3A CHRONIC KIDNEY DISEASE (HCC): ICD-10-CM

## 2021-03-18 DIAGNOSIS — M10.9 ACUTE GOUT INVOLVING TOE OF RIGHT FOOT, UNSPECIFIED CAUSE: ICD-10-CM

## 2021-03-18 DIAGNOSIS — M10.471 ACUTE GOUT DUE TO OTHER SECONDARY CAUSE INVOLVING TOE OF RIGHT FOOT: Primary | ICD-10-CM

## 2021-03-18 PROBLEM — G56.00 CARPAL TUNNEL SYNDROME: Status: ACTIVE | Noted: 2021-02-01

## 2021-03-18 LAB
ANION GAP SERPL CALCULATED.3IONS-SCNC: 10 MMOL/L (ref 5–15)
BUN SERPL-MCNC: 15 MG/DL (ref 7–23)
BUN/CREAT SERPL: 14 (ref 7–25)
CALCIUM SPEC-SCNC: 9.6 MG/DL (ref 8.4–10.2)
CHLORIDE SERPL-SCNC: 101 MMOL/L (ref 101–112)
CO2 SERPL-SCNC: 30 MMOL/L (ref 22–30)
CREAT SERPL-MCNC: 1.07 MG/DL (ref 0.52–1.04)
GFR SERPL CREATININE-BSD FRML MDRD: 51 ML/MIN/1.73 (ref 39–90)
GLUCOSE SERPL-MCNC: 168 MG/DL (ref 70–99)
POTASSIUM SERPL-SCNC: 4.3 MMOL/L (ref 3.4–5)
SODIUM SERPL-SCNC: 141 MMOL/L (ref 137–145)
URATE SERPL-MCNC: 8.7 MG/DL (ref 2.5–6.2)

## 2021-03-18 PROCEDURE — 99213 OFFICE O/P EST LOW 20 MIN: CPT | Performed by: FAMILY MEDICINE

## 2021-03-18 PROCEDURE — 96372 THER/PROPH/DIAG INJ SC/IM: CPT | Performed by: FAMILY MEDICINE

## 2021-03-18 PROCEDURE — 36415 COLL VENOUS BLD VENIPUNCTURE: CPT | Performed by: FAMILY MEDICINE

## 2021-03-18 PROCEDURE — 80048 BASIC METABOLIC PNL TOTAL CA: CPT | Performed by: FAMILY MEDICINE

## 2021-03-18 PROCEDURE — 84550 ASSAY OF BLOOD/URIC ACID: CPT | Performed by: FAMILY MEDICINE

## 2021-03-18 RX ORDER — METHYLPREDNISOLONE ACETATE 80 MG/ML
80 INJECTION, SUSPENSION INTRA-ARTICULAR; INTRALESIONAL; INTRAMUSCULAR; SOFT TISSUE ONCE
Status: COMPLETED | OUTPATIENT
Start: 2021-03-18 | End: 2021-03-18

## 2021-03-18 RX ORDER — COLCHICINE 0.6 MG/1
TABLET ORAL
Qty: 60 TABLET | Refills: 5 | Status: SHIPPED | OUTPATIENT
Start: 2021-03-18 | End: 2022-07-20 | Stop reason: SDUPTHER

## 2021-03-18 RX ADMIN — METHYLPREDNISOLONE ACETATE 80 MG: 80 INJECTION, SUSPENSION INTRA-ARTICULAR; INTRALESIONAL; INTRAMUSCULAR; SOFT TISSUE at 09:30

## 2021-03-18 NOTE — PROGRESS NOTES
Subjective   Marielle Hansen is a 69 y.o. female who presents to the office today for a 1 week history of pain swelling and redness in the right great toe at the first MTP joint.  No prior history of gout.  This area is very tender and even hurts to touch.  She tried some over-the-counter medicines but did not get any relief.    Foot Pain  Associated symptoms include arthralgias. Pertinent negatives include no chills, fever, nausea, numbness, vomiting or weakness.      The following portions of the patient's history were reviewed and updated as appropriate: allergies, current medications, past family history, past medical history, past social history, past surgical history and problem list.    Review of Systems   Constitutional: Negative for chills and fever.   Respiratory: Negative.    Cardiovascular: Negative.    Gastrointestinal: Negative for nausea and vomiting.   Genitourinary: Negative for hematuria and vaginal bleeding.   Musculoskeletal: Positive for arthralgias, back pain and gait problem.   Skin: Negative.    Allergic/Immunologic: Negative for immunocompromised state.   Neurological: Negative for dizziness, tremors, seizures, syncope, weakness, light-headedness and numbness.   Hematological: Negative.    Psychiatric/Behavioral: Positive for sleep disturbance. Negative for agitation and confusion. The patient is nervous/anxious.    All other systems reviewed and are negative.      Objective    Vitals:    03/18/21 0803   PainSc: 10-Worst pain ever   PainLoc: Foot   There is no height or weight on file to calculate BMI.    Physical Exam   Constitutional: She is oriented to person, place, and time. She appears well-developed.   Obese.   HENT:   Head: Normocephalic and atraumatic.   Nose: Nose normal.   Eyes: Pupils are equal, round, and reactive to light. Conjunctivae are normal.   Neck: No JVD present. No tracheal deviation present. No thyromegaly present.   Cardiovascular: Normal rate, regular rhythm  and normal heart sounds.   No murmur heard.  Pulmonary/Chest: Effort normal and breath sounds normal. She has no wheezes.   Abdominal: Soft. Bowel sounds are normal. She exhibits no distension. There is no abdominal tenderness.   Musculoskeletal: Normal range of motion.      Comments: First MTP joint is swollen red warm and tender.   Lymphadenopathy:     She has no cervical adenopathy.   Neurological: She is alert and oriented to person, place, and time. Coordination normal.   Skin: Skin is warm and dry. No rash noted.   Nursing note and vitals reviewed.         Component   Ref Range & Units 09:30   Uric Acid   2.5 - 6.2 mg/dL 8.7High     Resulting Agency [facility]         Specimen Collected: 03/18/21 09:30   Last Resulted: 03/18/21 10:11             Assessment/Plan   Diagnoses and all orders for this visit:    1. Acute gout due to other secondary cause involving toe of right foot (Primary)  -     Uric acid; Future  -     methylPREDNISolone acetate (DEPO-medrol) injection 80 mg    2. Stage 3a chronic kidney disease (CMS/HCC)  -     Basic Metabolic Panel; Future    Other orders  -     colchicine 0.6 MG tablet; 2 tablets by mouth this morning, 1 tablet an hour later.  Then 1 tablet BID  Dispense: 60 tablet; Refill: 5    Will treat with colchicine and gave Depo-Medrol 80 mg IM today for pain.  Recheck renal functions which have actually improved but remain slightly elevated and we will continue to monitor these closely.  Have avoided indomethacin or other strong NSAIDs due to chronic kidney disease        This document has been electronically signed by Yoselin Rubio MD on March 18, 2021 09:21 CDT

## 2021-03-18 NOTE — TELEPHONE ENCOUNTER
Marielle Hansen (Key: [id number]) - [id number]  Colchicine 0.6MG tablets  Status: PA Response - Approved   [id number]

## 2021-03-18 NOTE — PROGRESS NOTES
Please call the patient regarding her abnormal result.  Definitely is gout.  Her kidney functions look a little better today on the blood work than last time though.

## 2021-04-07 DIAGNOSIS — M54.2 NECK PAIN: ICD-10-CM

## 2021-04-07 RX ORDER — GABAPENTIN 300 MG/1
300 CAPSULE ORAL 3 TIMES DAILY
Qty: 90 CAPSULE | Refills: 0 | Status: SHIPPED | OUTPATIENT
Start: 2021-04-07 | End: 2021-06-01

## 2021-04-20 ENCOUNTER — OFFICE VISIT (OUTPATIENT)
Dept: FAMILY MEDICINE CLINIC | Facility: CLINIC | Age: 71
End: 2021-04-20

## 2021-04-20 DIAGNOSIS — E03.9 HYPOTHYROIDISM, UNSPECIFIED TYPE: ICD-10-CM

## 2021-04-20 DIAGNOSIS — M54.41 CHRONIC RIGHT-SIDED LOW BACK PAIN WITH RIGHT-SIDED SCIATICA: Primary | ICD-10-CM

## 2021-04-20 DIAGNOSIS — G89.29 CHRONIC RIGHT-SIDED LOW BACK PAIN WITH RIGHT-SIDED SCIATICA: Primary | ICD-10-CM

## 2021-04-20 PROCEDURE — 99214 OFFICE O/P EST MOD 30 MIN: CPT | Performed by: FAMILY MEDICINE

## 2021-04-20 RX ORDER — LEVOTHYROXINE SODIUM 137 UG/1
137 TABLET ORAL DAILY
Qty: 30 TABLET | Refills: 5 | Status: SHIPPED | OUTPATIENT
Start: 2021-04-20 | End: 2021-06-01 | Stop reason: SDUPTHER

## 2021-04-20 RX ORDER — HYDROCODONE BITARTRATE AND ACETAMINOPHEN 7.5; 325 MG/1; MG/1
1 TABLET ORAL EVERY 6 HOURS PRN
Qty: 30 TABLET | Refills: 0 | Status: SHIPPED | OUTPATIENT
Start: 2021-04-20 | End: 2021-04-27 | Stop reason: SDUPTHER

## 2021-04-20 NOTE — PROGRESS NOTES
Subjective   Marielle Hansen is a 69 y.o. female who presents today in the office with pain in the right low back that is going around toward the groin and down the front of the right leg.  She is in physical therapy but has only had 3 sessions.  She does plan to continue it but wondered about something to help with pain after the therapy.  We talked about this and she understands that initially the therapy could cause some increased pain because she is stretching and using joints that she may not have used recently and certainly is willing to continue with it.  Also needs refill of thyroid medicine.    History of Present Illness   The following portions of the patient's history were reviewed and updated as appropriate: allergies, current medications, past family history, past medical history, past social history, past surgical history and problem list.    Review of Systems   Respiratory: Negative.    Cardiovascular: Negative.    Genitourinary: Negative for hematuria and vaginal bleeding.   Musculoskeletal: Positive for back pain and gait problem.   Skin: Negative.    Allergic/Immunologic: Negative for immunocompromised state.   Neurological: Negative for dizziness, tremors, seizures, syncope and light-headedness.   Hematological: Negative.    Psychiatric/Behavioral: Positive for sleep disturbance. Negative for agitation and confusion. The patient is nervous/anxious.    All other systems reviewed and are negative.      Objective    Vitals:    04/20/21 1033   PainSc:   7   PainLoc: Back   There is no height or weight on file to calculate BMI.    Physical Exam   Constitutional: She is oriented to person, place, and time. She appears well-developed.   Obese.   HENT:   Head: Normocephalic and atraumatic.   Nose: Nose normal.   Eyes: Pupils are equal, round, and reactive to light. Conjunctivae are normal.   Neck: No JVD present. No tracheal deviation present. No thyromegaly present.   Cardiovascular: Normal rate,  regular rhythm and normal heart sounds.   No murmur heard.  Pulmonary/Chest: Effort normal and breath sounds normal. She has no wheezes.   Abdominal: Soft. Bowel sounds are normal. She exhibits no distension. There is no abdominal tenderness.   Musculoskeletal: Normal range of motion.      Comments: Patient has very stiff gait, some mild tenderness on the right low back.   Lymphadenopathy:     She has no cervical adenopathy.   Neurological: She is alert and oriented to person, place, and time. Coordination normal.   Skin: Skin is warm and dry. No rash noted.   Nursing note and vitals reviewed.      Assessment/Plan   Diagnoses and all orders for this visit:    1. Chronic right-sided low back pain with right-sided sciatica (Primary)  -     HYDROcodone-acetaminophen (Norco) 7.5-325 MG per tablet; Take 1 tablet by mouth Every 6 (Six) Hours As Needed for Moderate Pain .  Dispense: 30 tablet; Refill: 0    2. Hypothyroidism, unspecified type  -     levothyroxine (SYNTHROID, LEVOTHROID) 137 MCG tablet; Take 1 tablet by mouth Daily.  Dispense: 30 tablet; Refill: 5    The patient has read and signed the [facility] Controlled Substance Contract.  I will continue to see patient for regular follow up appointments. Patient is well controlled on the medication.  MARY has been reviewed by me and is updated every 3 months. The patient is aware of the potential for addiction and dependence.     Gave hydrocodone to take up to once a day, mostly when needed for physical therapy.  Hopefully this will improve her pain symptoms and she will not continue to needed but will follow up with her in a month    Continue current dose of Synthroid and recheck when labs are done          This document has been electronically signed by Yoselin Rubio MD on April 20, 2021 11:00 CDT

## 2021-04-27 ENCOUNTER — OFFICE VISIT (OUTPATIENT)
Dept: FAMILY MEDICINE CLINIC | Facility: CLINIC | Age: 71
End: 2021-04-27

## 2021-04-27 VITALS — WEIGHT: 227 LBS | BODY MASS INDEX: 35.55 KG/M2

## 2021-04-27 DIAGNOSIS — R11.0 NAUSEA: ICD-10-CM

## 2021-04-27 DIAGNOSIS — M54.42 CHRONIC BILATERAL LOW BACK PAIN WITH BILATERAL SCIATICA: Primary | ICD-10-CM

## 2021-04-27 DIAGNOSIS — M54.41 CHRONIC BILATERAL LOW BACK PAIN WITH BILATERAL SCIATICA: Primary | ICD-10-CM

## 2021-04-27 DIAGNOSIS — G89.29 CHRONIC BILATERAL LOW BACK PAIN WITH BILATERAL SCIATICA: Primary | ICD-10-CM

## 2021-04-27 PROCEDURE — 99213 OFFICE O/P EST LOW 20 MIN: CPT | Performed by: FAMILY MEDICINE

## 2021-04-27 RX ORDER — HYDROCODONE BITARTRATE AND ACETAMINOPHEN 7.5; 325 MG/1; MG/1
1 TABLET ORAL EVERY 6 HOURS PRN
Qty: 120 TABLET | Refills: 0 | Status: SHIPPED | OUTPATIENT
Start: 2021-04-27 | End: 2021-06-01 | Stop reason: SDUPTHER

## 2021-04-27 RX ORDER — ONDANSETRON 4 MG/1
4 TABLET, ORALLY DISINTEGRATING ORAL EVERY 8 HOURS PRN
Qty: 30 TABLET | Refills: 5 | Status: SHIPPED | OUTPATIENT
Start: 2021-04-27 | End: 2022-07-20 | Stop reason: SDUPTHER

## 2021-04-27 NOTE — PROGRESS NOTES
Subjective   Marielle Hansen is a 69 y.o. female who presents today in the office with continued severe pain in the low back.  Now is going across both sides and down into the legs.  When she tries to straighten her legs out it exacerbates the pain in her back and she notes that anytime she tries to stand up she has leakage of urine.  It is difficult for her to walk at all at this point and she says that she cannot turn over in bed because of the pain.  She has been in physical therapy but it has not helped.  Pain is so bad it causes nausea.    History of Present Illness   The following portions of the patient's history were reviewed and updated as appropriate: allergies, current medications, past family history, past medical history, past social history, past surgical history and problem list.    Review of Systems   Respiratory: Negative.    Cardiovascular: Negative.    Genitourinary: Negative for hematuria and vaginal bleeding.   Musculoskeletal: Positive for gait problem.   Skin: Negative.    Allergic/Immunologic: Negative for immunocompromised state.   Neurological: Negative for dizziness, tremors, seizures, syncope and light-headedness.   Hematological: Negative.    Psychiatric/Behavioral: Positive for sleep disturbance. Negative for agitation and confusion. The patient is nervous/anxious.    All other systems reviewed and are negative.      Objective    Vitals:    04/27/21 1311   Weight: 103 kg (227 lb)   PainSc: 10-Worst pain ever   PainLoc: Back   Body mass index is 35.55 kg/m².    Physical Exam   Constitutional: She is oriented to person, place, and time. She appears well-developed.   Obese.   HENT:   Head: Normocephalic and atraumatic.   Nose: Nose normal.   Eyes: Pupils are equal, round, and reactive to light. Conjunctivae are normal.   Neck: No JVD present. No tracheal deviation present. No thyromegaly present.   Cardiovascular: Normal rate, regular rhythm and normal heart sounds.   No murmur  heard.  Pulmonary/Chest: Effort normal and breath sounds normal. She has no wheezes.   Abdominal: Soft. Bowel sounds are normal. She exhibits no distension. There is no abdominal tenderness.   Musculoskeletal: Normal range of motion.      Comments: Patient has very stiff gait, some mild tenderness on the right low back.   Lymphadenopathy:     She has no cervical adenopathy.   Neurological: She is alert and oriented to person, place, and time. Coordination normal.   Skin: Skin is warm and dry. No rash noted.   Nursing note and vitals reviewed.      Assessment/Plan   Diagnoses and all orders for this visit:    1. Chronic bilateral low back pain with bilateral sciatica (Primary)  -     MRI Lumbar Spine Without Contrast; Future  -     HYDROcodone-acetaminophen (Norco) 7.5-325 MG per tablet; Take 1 tablet by mouth Every 6 (Six) Hours As Needed for Moderate Pain .  Dispense: 120 tablet; Refill: 0    2. Nausea  -     ondansetron ODT (Zofran ODT) 4 MG disintegrating tablet; Place 1 tablet on the tongue Every 8 (Eight) Hours As Needed for Nausea or Vomiting.  Dispense: 30 tablet; Refill: 5    The patient has read and signed the [facility] Controlled Substance Contract.  I will continue to see patient for regular follow up appointments. Patient is well controlled on the medication.  MARY has been reviewed by me and is updated every 3 months. The patient is aware of the potential for addiction and dependence.     Hold PT until MRI is done.   We will increase pain medicine to up to every 4 hours in the short-term until we get the MRI done and appropriate referrals made as indicated    Zofran given for nausea        This document has been electronically signed by Yoselin Rubio MD on April 27, 2021 13:53 CDT

## 2021-05-07 ENCOUNTER — APPOINTMENT (OUTPATIENT)
Dept: MRI IMAGING | Facility: HOSPITAL | Age: 71
End: 2021-05-07

## 2021-05-17 ENCOUNTER — HOSPITAL ENCOUNTER (OUTPATIENT)
Dept: MRI IMAGING | Facility: HOSPITAL | Age: 71
Discharge: HOME | End: 2021-05-17
Admitting: FAMILY MEDICINE

## 2021-05-17 DIAGNOSIS — M54.42 CHRONIC BILATERAL LOW BACK PAIN WITH BILATERAL SCIATICA: ICD-10-CM

## 2021-05-17 DIAGNOSIS — M54.41 CHRONIC BILATERAL LOW BACK PAIN WITH BILATERAL SCIATICA: ICD-10-CM

## 2021-05-17 DIAGNOSIS — G89.29 CHRONIC BILATERAL LOW BACK PAIN WITH BILATERAL SCIATICA: ICD-10-CM

## 2021-05-17 PROCEDURE — 72148 MRI LUMBAR SPINE W/O DYE: CPT

## 2021-06-01 DIAGNOSIS — M54.2 NECK PAIN: ICD-10-CM

## 2021-06-01 DIAGNOSIS — G89.29 CHRONIC BILATERAL LOW BACK PAIN WITH BILATERAL SCIATICA: ICD-10-CM

## 2021-06-01 DIAGNOSIS — M54.41 CHRONIC BILATERAL LOW BACK PAIN WITH BILATERAL SCIATICA: ICD-10-CM

## 2021-06-01 DIAGNOSIS — E03.9 HYPOTHYROIDISM, UNSPECIFIED TYPE: ICD-10-CM

## 2021-06-01 DIAGNOSIS — M54.42 CHRONIC BILATERAL LOW BACK PAIN WITH BILATERAL SCIATICA: ICD-10-CM

## 2021-06-01 RX ORDER — METOPROLOL SUCCINATE 100 MG/1
TABLET, EXTENDED RELEASE ORAL
Qty: 90 TABLET | Refills: 0 | Status: SHIPPED | OUTPATIENT
Start: 2021-06-01 | End: 2021-10-11 | Stop reason: SDUPTHER

## 2021-06-01 RX ORDER — HYDROCODONE BITARTRATE AND ACETAMINOPHEN 7.5; 325 MG/1; MG/1
1 TABLET ORAL EVERY 6 HOURS PRN
Qty: 120 TABLET | Refills: 0 | Status: SHIPPED | OUTPATIENT
Start: 2021-06-01 | End: 2021-06-30 | Stop reason: SDUPTHER

## 2021-06-01 RX ORDER — LEVOTHYROXINE SODIUM 137 UG/1
137 TABLET ORAL DAILY
Qty: 30 TABLET | Refills: 5 | Status: SHIPPED | OUTPATIENT
Start: 2021-06-01 | End: 2021-08-26 | Stop reason: SDUPTHER

## 2021-06-01 RX ORDER — TRAZODONE HYDROCHLORIDE 100 MG/1
TABLET ORAL
Qty: 90 TABLET | Refills: 0 | Status: SHIPPED | OUTPATIENT
Start: 2021-06-01 | End: 2021-08-26 | Stop reason: SDUPTHER

## 2021-06-01 RX ORDER — GABAPENTIN 300 MG/1
CAPSULE ORAL
Qty: 90 CAPSULE | Refills: 0 | Status: SHIPPED | OUTPATIENT
Start: 2021-06-01 | End: 2022-02-28

## 2021-06-01 RX ORDER — LOVASTATIN 40 MG/1
TABLET ORAL
Qty: 90 TABLET | Refills: 0 | Status: SHIPPED | OUTPATIENT
Start: 2021-06-01 | End: 2021-09-27

## 2021-06-09 ENCOUNTER — OFFICE VISIT (OUTPATIENT)
Dept: FAMILY MEDICINE CLINIC | Facility: CLINIC | Age: 71
End: 2021-06-09

## 2021-06-09 ENCOUNTER — LAB (OUTPATIENT)
Dept: LAB | Facility: OTHER | Age: 71
End: 2021-06-09

## 2021-06-09 DIAGNOSIS — R30.0 DYSURIA: ICD-10-CM

## 2021-06-09 DIAGNOSIS — E55.9 VITAMIN D DEFICIENCY: ICD-10-CM

## 2021-06-09 DIAGNOSIS — M51.26 DEGENERATION OF LUMBAR INTERVERTEBRAL DISC WITH ACUTE HERNIATION: Primary | ICD-10-CM

## 2021-06-09 DIAGNOSIS — M51.36 DEGENERATION OF LUMBAR INTERVERTEBRAL DISC WITH ACUTE HERNIATION: Primary | ICD-10-CM

## 2021-06-09 LAB
BILIRUB UR QL STRIP: NEGATIVE
CLARITY UR: ABNORMAL
COLOR UR: YELLOW
GLUCOSE UR STRIP-MCNC: NEGATIVE MG/DL
HGB UR QL STRIP.AUTO: NEGATIVE
KETONES UR QL STRIP: NEGATIVE
LEUKOCYTE ESTERASE UR QL STRIP.AUTO: NEGATIVE
NITRITE UR QL STRIP: NEGATIVE
PH UR STRIP.AUTO: 5.5 [PH] (ref 5.5–8)
PROT UR QL STRIP: NEGATIVE
SP GR UR STRIP: >=1.03 (ref 1–1.03)
UROBILINOGEN UR QL STRIP: ABNORMAL

## 2021-06-09 PROCEDURE — 81003 URINALYSIS AUTO W/O SCOPE: CPT | Performed by: FAMILY MEDICINE

## 2021-06-09 PROCEDURE — 99214 OFFICE O/P EST MOD 30 MIN: CPT | Performed by: FAMILY MEDICINE

## 2021-06-09 RX ORDER — CHOLECALCIFEROL (VITAMIN D3) 1250 MCG
50000 CAPSULE ORAL 2 TIMES WEEKLY
Qty: 8 CAPSULE | Refills: 5 | Status: SHIPPED | OUTPATIENT
Start: 2021-06-10 | End: 2022-04-18 | Stop reason: SDUPTHER

## 2021-06-09 NOTE — PROGRESS NOTES
Subjective   Marielle Hansen is a 69 y.o. female here in the office today with continued pain in the low back.  She had an MRI and it did show significant degenerative changes at multiple levels but what appears to be an acute disc bulge at L4-5 with nerve root impingement.  This is consistent with the pain she is having.  The pain has been so severe that she is willing to undergo surgery if she is a candidate.  The pain is particularly bad shooting down the left leg and is limiting her activity.    She also has some dysuria frequency and urgency    Needs a refill of vitamin D.    History of Present Illness   The following portions of the patient's history were reviewed and updated as appropriate: allergies, current medications, past family history, past medical history, past social history, past surgical history and problem list.    Review of Systems   Respiratory: Negative.    Cardiovascular: Negative.    Genitourinary: Negative for hematuria and vaginal bleeding.   Musculoskeletal: Positive for gait problem.   Skin: Negative.    Allergic/Immunologic: Negative for immunocompromised state.   Neurological: Negative for dizziness, tremors, seizures, syncope and light-headedness.   Hematological: Negative.    Psychiatric/Behavioral: Positive for sleep disturbance. Negative for agitation and confusion. The patient is nervous/anxious.    All other systems reviewed and are negative.      Objective    Vitals:    06/09/21 1005   PainSc: 0-No pain   There is no height or weight on file to calculate BMI.    Physical Exam   Constitutional: She is oriented to person, place, and time. She appears well-developed.   Obese.   HENT:   Head: Normocephalic and atraumatic.   Nose: Nose normal.   Eyes: Pupils are equal, round, and reactive to light. Conjunctivae are normal.   Neck: No JVD present. No tracheal deviation present. No thyromegaly present.   Cardiovascular: Normal rate, regular rhythm and normal heart sounds.   No  murmur heard.  Pulmonary/Chest: Effort normal and breath sounds normal. She has no wheezes.   Abdominal: Soft. Bowel sounds are normal. She exhibits no distension. There is no abdominal tenderness.   Musculoskeletal: Normal range of motion.      Comments: Patient has very stiff gait, some mild tenderness on the right low back.   Lymphadenopathy:     She has no cervical adenopathy.   Neurological: She is alert and oriented to person, place, and time. Coordination normal.   Skin: Skin is warm and dry. No rash noted.   Nursing note and vitals reviewed.    Study Result    Narrative & Impression   EXAM DESCRIPTION: MRI LUMBAR SPINE WO CONTRAST     CLINICAL HISTORY: 70-year-old female with history of chronic low  back pain now with sudden increase pain. Pain down both legs.  Patient urinates herself when standing up. Study notes: Patient  states no specific injury or surgery in the region. Right-sided  lumbar pain that radiates into the right hip and leg.     COMPARISON: 11/14/2019     FINDINGS:       Sagittal and axial T1 and T2 MR images of lumbar spine are  submitted for interpretation.      There are small renal cortical cysts redemonstrated. No acute  finding within the included paraspinal soft tissues.     There is anatomic alignment of the lumbar and the included  thoracic vertebra. There is no compression fracture.  Redemonstration of heterogeneous T1 and T2 marrow signal without  focal suspicious mass or STIR signal abnormality.     The conus terminates at the L2 level and demonstrates normal  signal and morphology.     Loss of T2 disc signal at all lumbar levels. There is loss of  disc space height at L1-2 and L2-3.     T12-L1:  No significant disc bulge, protrusion/extrusion, or  stenosis. Facet arthropathy.        L1-L2:  There is mild disc bulge without focal  protrusion/extrusion or stenosis. Facet arthropathy.     L2-L3:  Redemonstration of mild disc bulge and small left central  to subarticular disc  protrusion. There is facet arthropathy and  some thickening of the ligamentum flavum. Contour changes of the  thecal sac without significant central spinal canal stenosis.  There is minimal foraminal stenosis inferiorly. No compromise of  exiting nerves.     L3-L4: Minimal disc bulge without protrusion/extrusion. There is  facet arthropathy. No central spinal canal or foraminal stenosis.     L4-L5: There is disc bulge with a new finding of a left central  to subarticular disc protrusion which appears fairly bright on T2  signal suggesting is fairly acute. It contributes to asymmetrical  compression of the left ventral thecal sac to a dimension of 8  mm, mildly stenotic. This causes encroachment upon the left  lateral recess without compression of the coursing nerve. There  is facet arthropathy and thickening of the ligamentum flavum. No  significant foraminal stenosis.     L5-S1: No significant disc bulge, protrusion/extrusion, or  stenosis. There is severe facet arthropathy.     IMPRESSION:  Multilevel degenerative changes as detailed above.           Electronically signed by:  Judson Lepe MD  5/17/2021 3:57 PM  CDT Workstation: [id number]       Assessment/Plan   Diagnoses and all orders for this visit:    1. Degeneration of lumbar intervertebral disc with acute herniation (Primary)  -     Ambulatory Referral to Neurosurgery    2. Dysuria  -     Urinalysis With Culture If Indicated -; Future    3. Vitamin D deficiency    Other orders  -     Cholecalciferol (Vitamin D3) 1.25 MG (05331 UT) capsule; Take 1 capsule by mouth 2 (Two) Times a Week.  Dispense: 8 capsule; Refill: 5    Refer to neurosurgery.  Patient will take the MRI with her on disc.  We will continue with present regimen of pain management for now with hydrocodone.  Goal is to have her tapered off this or minimize use after surgical intervention if it is done.    We will check a urinalysis and follow-up accordingly.    Continue vitamin D  supplements.          This document has been electronically signed by Yoselin Rubio MD on June 9, 2021 10:39 CDT

## 2021-06-30 DIAGNOSIS — M54.42 CHRONIC BILATERAL LOW BACK PAIN WITH BILATERAL SCIATICA: ICD-10-CM

## 2021-06-30 DIAGNOSIS — G89.29 CHRONIC BILATERAL LOW BACK PAIN WITH BILATERAL SCIATICA: ICD-10-CM

## 2021-06-30 DIAGNOSIS — M54.41 CHRONIC BILATERAL LOW BACK PAIN WITH BILATERAL SCIATICA: ICD-10-CM

## 2021-06-30 RX ORDER — HYDROCODONE BITARTRATE AND ACETAMINOPHEN 7.5; 325 MG/1; MG/1
1 TABLET ORAL EVERY 6 HOURS PRN
Qty: 120 TABLET | Refills: 0 | Status: SHIPPED | OUTPATIENT
Start: 2021-06-30 | End: 2021-08-02 | Stop reason: SDUPTHER

## 2021-08-02 DIAGNOSIS — I10 ESSENTIAL HYPERTENSION: ICD-10-CM

## 2021-08-02 DIAGNOSIS — M54.41 CHRONIC BILATERAL LOW BACK PAIN WITH BILATERAL SCIATICA: ICD-10-CM

## 2021-08-02 DIAGNOSIS — M54.42 CHRONIC BILATERAL LOW BACK PAIN WITH BILATERAL SCIATICA: ICD-10-CM

## 2021-08-02 DIAGNOSIS — G89.29 CHRONIC BILATERAL LOW BACK PAIN WITH BILATERAL SCIATICA: ICD-10-CM

## 2021-08-02 RX ORDER — AMLODIPINE BESYLATE 5 MG/1
TABLET ORAL
Qty: 90 TABLET | Refills: 0 | Status: SHIPPED | OUTPATIENT
Start: 2021-08-02 | End: 2021-11-29 | Stop reason: SDUPTHER

## 2021-08-02 RX ORDER — HYDROCODONE BITARTRATE AND ACETAMINOPHEN 7.5; 325 MG/1; MG/1
1 TABLET ORAL EVERY 6 HOURS PRN
Qty: 120 TABLET | Refills: 0 | Status: SHIPPED | OUTPATIENT
Start: 2021-08-02 | End: 2021-08-31 | Stop reason: SDUPTHER

## 2021-08-02 RX ORDER — OLMESARTAN MEDOXOMIL AND HYDROCHLOROTHIAZIDE 40/12.5 40; 12.5 MG/1; MG/1
TABLET ORAL
Qty: 90 TABLET | Refills: 0 | Status: SHIPPED | OUTPATIENT
Start: 2021-08-02 | End: 2021-11-01 | Stop reason: SDUPTHER

## 2021-08-26 DIAGNOSIS — E03.9 HYPOTHYROIDISM, UNSPECIFIED TYPE: ICD-10-CM

## 2021-08-26 RX ORDER — TRAZODONE HYDROCHLORIDE 100 MG/1
100 TABLET ORAL NIGHTLY
Qty: 90 TABLET | Refills: 3 | Status: SHIPPED | OUTPATIENT
Start: 2021-08-26 | End: 2022-01-11 | Stop reason: SDUPTHER

## 2021-08-26 RX ORDER — LEVOTHYROXINE SODIUM 137 UG/1
137 TABLET ORAL DAILY
Qty: 30 TABLET | Refills: 5 | Status: SHIPPED | OUTPATIENT
Start: 2021-08-26 | End: 2021-11-29 | Stop reason: SDUPTHER

## 2021-08-31 DIAGNOSIS — M54.42 CHRONIC BILATERAL LOW BACK PAIN WITH BILATERAL SCIATICA: ICD-10-CM

## 2021-08-31 DIAGNOSIS — M54.41 CHRONIC BILATERAL LOW BACK PAIN WITH BILATERAL SCIATICA: ICD-10-CM

## 2021-08-31 DIAGNOSIS — G89.29 CHRONIC BILATERAL LOW BACK PAIN WITH BILATERAL SCIATICA: ICD-10-CM

## 2021-08-31 RX ORDER — HYDROCODONE BITARTRATE AND ACETAMINOPHEN 7.5; 325 MG/1; MG/1
1 TABLET ORAL EVERY 6 HOURS PRN
Qty: 120 TABLET | Refills: 0 | Status: SHIPPED | OUTPATIENT
Start: 2021-08-31 | End: 2021-09-28 | Stop reason: SDUPTHER

## 2021-09-08 RX ORDER — CYANOCOBALAMIN 1000 UG/ML
INJECTION, SOLUTION INTRAMUSCULAR; SUBCUTANEOUS
Qty: 1 ML | Refills: 0 | Status: SHIPPED | OUTPATIENT
Start: 2021-09-08 | End: 2021-12-06

## 2021-09-08 RX ORDER — ESCITALOPRAM OXALATE 10 MG/1
10 TABLET ORAL DAILY
Qty: 30 TABLET | Refills: 5 | Status: SHIPPED | OUTPATIENT
Start: 2021-09-08 | End: 2021-12-21 | Stop reason: ALTCHOICE

## 2021-09-22 ENCOUNTER — OFFICE VISIT (OUTPATIENT)
Dept: FAMILY MEDICINE CLINIC | Facility: CLINIC | Age: 71
End: 2021-09-22

## 2021-09-22 ENCOUNTER — LAB (OUTPATIENT)
Dept: LAB | Facility: OTHER | Age: 71
End: 2021-09-22

## 2021-09-22 VITALS
HEART RATE: 77 BPM | DIASTOLIC BLOOD PRESSURE: 68 MMHG | TEMPERATURE: 98.6 F | BODY MASS INDEX: 34.46 KG/M2 | OXYGEN SATURATION: 97 % | WEIGHT: 220 LBS | SYSTOLIC BLOOD PRESSURE: 138 MMHG

## 2021-09-22 DIAGNOSIS — E55.9 VITAMIN D DEFICIENCY: ICD-10-CM

## 2021-09-22 DIAGNOSIS — G89.29 CHRONIC BILATERAL LOW BACK PAIN WITH BILATERAL SCIATICA: Primary | ICD-10-CM

## 2021-09-22 DIAGNOSIS — E03.9 HYPOTHYROIDISM, UNSPECIFIED TYPE: ICD-10-CM

## 2021-09-22 DIAGNOSIS — Z12.31 ENCOUNTER FOR SCREENING MAMMOGRAM FOR MALIGNANT NEOPLASM OF BREAST: ICD-10-CM

## 2021-09-22 DIAGNOSIS — I10 ESSENTIAL HYPERTENSION: ICD-10-CM

## 2021-09-22 DIAGNOSIS — M54.42 CHRONIC BILATERAL LOW BACK PAIN WITH BILATERAL SCIATICA: Primary | ICD-10-CM

## 2021-09-22 DIAGNOSIS — M54.41 CHRONIC BILATERAL LOW BACK PAIN WITH BILATERAL SCIATICA: Primary | ICD-10-CM

## 2021-09-22 DIAGNOSIS — Z78.0 POSTMENOPAUSAL: ICD-10-CM

## 2021-09-22 LAB
ALBUMIN SERPL-MCNC: 4.4 G/DL (ref 3.5–5)
ALBUMIN/GLOB SERPL: 1.3 G/DL (ref 1.1–1.8)
ALP SERPL-CCNC: 51 U/L (ref 38–126)
ALT SERPL W P-5'-P-CCNC: 43 U/L
ANION GAP SERPL CALCULATED.3IONS-SCNC: 9 MMOL/L (ref 5–15)
AST SERPL-CCNC: 55 U/L (ref 14–36)
BASOPHILS # BLD AUTO: 0.06 10*3/MM3 (ref 0–0.2)
BASOPHILS NFR BLD AUTO: 0.6 % (ref 0–1.5)
BILIRUB SERPL-MCNC: 0.5 MG/DL (ref 0.2–1.3)
BUN SERPL-MCNC: 18 MG/DL (ref 7–23)
BUN/CREAT SERPL: 17.6 (ref 7–25)
CALCIUM SPEC-SCNC: 9.4 MG/DL (ref 8.4–10.2)
CHLORIDE SERPL-SCNC: 107 MMOL/L (ref 101–112)
CHOLEST SERPL-MCNC: 209 MG/DL (ref 150–200)
CO2 SERPL-SCNC: 24 MMOL/L (ref 22–30)
CREAT SERPL-MCNC: 1.02 MG/DL (ref 0.52–1.04)
DEPRECATED RDW RBC AUTO: 46.8 FL (ref 37–54)
EOSINOPHIL # BLD AUTO: 0.2 10*3/MM3 (ref 0–0.4)
EOSINOPHIL NFR BLD AUTO: 2 % (ref 0.3–6.2)
ERYTHROCYTE [DISTWIDTH] IN BLOOD BY AUTOMATED COUNT: 14.5 % (ref 12.3–15.4)
GFR SERPL CREATININE-BSD FRML MDRD: 53 ML/MIN/1.73 (ref 39–90)
GLOBULIN UR ELPH-MCNC: 3.3 GM/DL (ref 2.3–3.5)
GLUCOSE SERPL-MCNC: 120 MG/DL (ref 70–99)
HCT VFR BLD AUTO: 41.5 % (ref 34–46.6)
HDLC SERPL-MCNC: 38 MG/DL (ref 40–59)
HGB BLD-MCNC: 13.7 G/DL (ref 12–15.9)
LDLC SERPL CALC-MCNC: 108 MG/DL
LDLC/HDLC SERPL: 2.58 {RATIO} (ref 0–3.22)
LYMPHOCYTES # BLD AUTO: 2.39 10*3/MM3 (ref 0.7–3.1)
LYMPHOCYTES NFR BLD AUTO: 23.6 % (ref 19.6–45.3)
MCH RBC QN AUTO: 30 PG (ref 26.6–33)
MCHC RBC AUTO-ENTMCNC: 33 G/DL (ref 31.5–35.7)
MCV RBC AUTO: 90.8 FL (ref 79–97)
MONOCYTES # BLD AUTO: 0.78 10*3/MM3 (ref 0.1–0.9)
MONOCYTES NFR BLD AUTO: 7.7 % (ref 5–12)
NEUTROPHILS NFR BLD AUTO: 6.7 10*3/MM3 (ref 1.7–7)
NEUTROPHILS NFR BLD AUTO: 66.1 % (ref 42.7–76)
PLATELET # BLD AUTO: 229 10*3/MM3 (ref 140–450)
PMV BLD AUTO: 9.5 FL (ref 6–12)
POTASSIUM SERPL-SCNC: 4.2 MMOL/L (ref 3.4–5)
PROT SERPL-MCNC: 7.7 G/DL (ref 6.3–8.6)
RBC # BLD AUTO: 4.57 10*6/MM3 (ref 3.77–5.28)
SODIUM SERPL-SCNC: 140 MMOL/L (ref 137–145)
TRIGL SERPL-MCNC: 365 MG/DL
VLDLC SERPL-MCNC: 63 MG/DL (ref 5–40)
WBC # BLD AUTO: 10.13 10*3/MM3 (ref 3.4–10.8)

## 2021-09-22 PROCEDURE — 36415 COLL VENOUS BLD VENIPUNCTURE: CPT | Performed by: FAMILY MEDICINE

## 2021-09-22 PROCEDURE — 84439 ASSAY OF FREE THYROXINE: CPT | Performed by: FAMILY MEDICINE

## 2021-09-22 PROCEDURE — 99214 OFFICE O/P EST MOD 30 MIN: CPT | Performed by: FAMILY MEDICINE

## 2021-09-22 PROCEDURE — 80061 LIPID PANEL: CPT | Performed by: FAMILY MEDICINE

## 2021-09-22 PROCEDURE — 85025 COMPLETE CBC W/AUTO DIFF WBC: CPT | Performed by: FAMILY MEDICINE

## 2021-09-22 PROCEDURE — 82306 VITAMIN D 25 HYDROXY: CPT | Performed by: FAMILY MEDICINE

## 2021-09-22 PROCEDURE — 84443 ASSAY THYROID STIM HORMONE: CPT | Performed by: FAMILY MEDICINE

## 2021-09-22 PROCEDURE — 80053 COMPREHEN METABOLIC PANEL: CPT | Performed by: FAMILY MEDICINE

## 2021-09-22 NOTE — PROGRESS NOTES
Subjective   Marielle Hansen is a 69 y.o. female with hypertension hypothyroidism vitamin D deficiency and menopausal symptoms here in the office today with continued lumbar back pain.  She saw a neurosurgeon and was told that really there were no surgical options.  Right now she is in pain management and she has been getting shots and recently had II nerve ablations done.  The second 1 was about a week ago and she does not feel that has given her any pain relief.  She does not feel her current pain management is helping as much.  Upon further questioning though, I find out that she has stopped the gabapentin.  It was meant for her to take the gabapentin with the hydrocodone and she has not been doing this.  She did not understand that she was to continue it.    She needs labs as well for menopausal symptoms hypertension hypothyroidism vitamin D deficiency.  She is due for mammogram and bone density.     History of Present Illness   The following portions of the patient's history were reviewed and updated as appropriate: allergies, current medications, past family history, past medical history, past social history, past surgical history and problem list.  Back Pain   This is a chronic problem. The current episode started more than 1 year ago. The problem occurs constantly. The problem is unchanged. The pain is present in the lumbar spine. The quality of the pain is described as shooting, stabbing and aching. The pain radiates to the right knee and left knee. The pain is at a severity of 8/10. The pain is severe. The pain is the same all the time. The symptoms are aggravated by standing, twisting, position, bending and sitting. Stiffness is present at night, all day and in the morning. Associated symptoms include leg pain and tingling. Pertinent negatives include no abdominal pain, bladder incontinence, bowel incontinence, chest pain, dysuria, fever, headaches, numbness, paresis, paresthesias, pelvic pain,  perianal numbness, weakness or weight loss. Risk factors include sedentary lifestyle. Patient has tried analgesics, NSAIDs, muscle relaxant and heat for the symptoms. The treatment provided mild relief.     Review of Systems   Respiratory: Negative.    Cardiovascular: Negative.    Genitourinary: Negative for hematuria and vaginal bleeding.   Musculoskeletal: Positive for gait problem.   Skin: Negative.    Allergic/Immunologic: Negative for immunocompromised state.   Neurological: Negative for dizziness, tremors, seizures, syncope and light-headedness.   Hematological: Negative.    Psychiatric/Behavioral: Positive for sleep disturbance. Negative for agitation and confusion. The patient is nervous/anxious.    All other systems reviewed and are negative.      Objective    Vitals:    09/22/21 1012   BP: 138/68   Pulse: 77   Temp: 98.6 °F (37 °C)   SpO2: 97%   Weight: 99.8 kg (220 lb)   PainSc:   6   PainLoc: Back   Body mass index is 34.46 kg/m².    Physical Exam   Constitutional: She is oriented to person, place, and time. She appears well-developed.   Obese.   HENT:   Head: Normocephalic and atraumatic.   Nose: Nose normal.   Eyes: Pupils are equal, round, and reactive to light. Conjunctivae are normal.   Neck: No JVD present. No tracheal deviation present. No thyromegaly present.   Cardiovascular: Normal rate, regular rhythm and normal heart sounds.   No murmur heard.  Pulmonary/Chest: Effort normal and breath sounds normal. She has no wheezes.   Abdominal: Soft. Bowel sounds are normal. She exhibits no distension. There is no abdominal tenderness.   Musculoskeletal: Normal range of motion.      Comments: Patient has very stiff gait, some mild tenderness on the right low back.   Lymphadenopathy:     She has no cervical adenopathy.   Neurological: She is alert and oriented to person, place, and time. Coordination normal.   Skin: Skin is warm and dry. No rash noted.   Nursing note and vitals reviewed.    Study  Result    Narrative & Impression   EXAM DESCRIPTION: MRI LUMBAR SPINE WO CONTRAST     CLINICAL HISTORY: 70-year-old female with history of chronic low  back pain now with sudden increase pain. Pain down both legs.  Patient urinates herself when standing up. Study notes: Patient  states no specific injury or surgery in the region. Right-sided  lumbar pain that radiates into the right hip and leg.     COMPARISON: 11/14/2019     FINDINGS:       Sagittal and axial T1 and T2 MR images of lumbar spine are  submitted for interpretation.      There are small renal cortical cysts redemonstrated. No acute  finding within the included paraspinal soft tissues.     There is anatomic alignment of the lumbar and the included  thoracic vertebra. There is no compression fracture.  Redemonstration of heterogeneous T1 and T2 marrow signal without  focal suspicious mass or STIR signal abnormality.     The conus terminates at the L2 level and demonstrates normal  signal and morphology.     Loss of T2 disc signal at all lumbar levels. There is loss of  disc space height at L1-2 and L2-3.     T12-L1:  No significant disc bulge, protrusion/extrusion, or  stenosis. Facet arthropathy.        L1-L2:  There is mild disc bulge without focal  protrusion/extrusion or stenosis. Facet arthropathy.     L2-L3:  Redemonstration of mild disc bulge and small left central  to subarticular disc protrusion. There is facet arthropathy and  some thickening of the ligamentum flavum. Contour changes of the  thecal sac without significant central spinal canal stenosis.  There is minimal foraminal stenosis inferiorly. No compromise of  exiting nerves.     L3-L4: Minimal disc bulge without protrusion/extrusion. There is  facet arthropathy. No central spinal canal or foraminal stenosis.     L4-L5: There is disc bulge with a new finding of a left central  to subarticular disc protrusion which appears fairly bright on T2  signal suggesting is fairly acute. It  contributes to asymmetrical  compression of the left ventral thecal sac to a dimension of 8  mm, mildly stenotic. This causes encroachment upon the left  lateral recess without compression of the coursing nerve. There  is facet arthropathy and thickening of the ligamentum flavum. No  significant foraminal stenosis.     L5-S1: No significant disc bulge, protrusion/extrusion, or  stenosis. There is severe facet arthropathy.     IMPRESSION:  Multilevel degenerative changes as detailed above.           Electronically signed by:  Judson Lepe MD  5/17/2021 3:57 PM  CDT Workstation: [id number]       Assessment/Plan   Diagnoses and all orders for this visit:    1. Chronic bilateral low back pain with bilateral sciatica (Primary)    2. Encounter for screening mammogram for malignant neoplasm of breast  -     Mammo screening digital tomosynthesis bilateral w CAD; Future    3. Postmenopausal  -     DEXA Bone Density Axial    4. Essential hypertension  -     Comprehensive Metabolic Panel  -     CBC & Differential; Future  -     Lipid Panel; Future    5. Vitamin D deficiency  -     Vitamin D 25 Hydroxy    6. Hypothyroidism, unspecified type  -     TSH  -     T4, Free    Add back the gabapentin along with the pain medication.  See me back in a month.  Recommend she continue with pain management.    We will get labs as above for hypertension vitamin D deficiency and hypothyroidism.    We will get bone density, mammogram.    Continue current dose of Synthroid, continue metoprolol and olmesartan HCT for hypertension, lovastatin for hyperlipidemia.  Continue vitamin D replacement at the current dose.    I spent 30 minutes caring for Delores on this date of service. This time includes time spent by me in the following activities:preparing for the visit, obtaining and/or reviewing a separately obtained history, performing a medically appropriate examination and/or evaluation , counseling and educating the patient/family/caregiver,  ordering medications, tests, or procedures and documenting information in the medical record          This document has been electronically signed by Yoselin Rubio MD on September 22, 2021 10:44 CDT

## 2021-09-23 LAB
25(OH)D3 SERPL-MCNC: 56.8 NG/ML (ref 30–100)
T4 FREE SERPL-MCNC: 1.13 NG/DL (ref 0.93–1.7)
TSH SERPL DL<=0.05 MIU/L-ACNC: 1.98 UIU/ML (ref 0.27–4.2)

## 2021-09-27 RX ORDER — LOVASTATIN 40 MG/1
TABLET ORAL
Qty: 90 TABLET | Refills: 0 | Status: SHIPPED | OUTPATIENT
Start: 2021-09-27 | End: 2022-01-24

## 2021-09-28 DIAGNOSIS — M54.41 CHRONIC BILATERAL LOW BACK PAIN WITH BILATERAL SCIATICA: ICD-10-CM

## 2021-09-28 DIAGNOSIS — M54.42 CHRONIC BILATERAL LOW BACK PAIN WITH BILATERAL SCIATICA: ICD-10-CM

## 2021-09-28 DIAGNOSIS — G89.29 CHRONIC BILATERAL LOW BACK PAIN WITH BILATERAL SCIATICA: ICD-10-CM

## 2021-09-28 RX ORDER — HYDROCODONE BITARTRATE AND ACETAMINOPHEN 7.5; 325 MG/1; MG/1
1 TABLET ORAL EVERY 6 HOURS PRN
Qty: 120 TABLET | Refills: 0 | Status: SHIPPED | OUTPATIENT
Start: 2021-09-28 | End: 2021-11-01 | Stop reason: SDUPTHER

## 2021-10-11 RX ORDER — METOPROLOL SUCCINATE 100 MG/1
100 TABLET, EXTENDED RELEASE ORAL DAILY
Qty: 90 TABLET | Refills: 2 | Status: SHIPPED | OUTPATIENT
Start: 2021-10-11 | End: 2022-07-20 | Stop reason: SDUPTHER

## 2021-11-01 DIAGNOSIS — G89.29 CHRONIC BILATERAL LOW BACK PAIN WITH BILATERAL SCIATICA: ICD-10-CM

## 2021-11-01 DIAGNOSIS — M54.42 CHRONIC BILATERAL LOW BACK PAIN WITH BILATERAL SCIATICA: ICD-10-CM

## 2021-11-01 DIAGNOSIS — M54.41 CHRONIC BILATERAL LOW BACK PAIN WITH BILATERAL SCIATICA: ICD-10-CM

## 2021-11-01 DIAGNOSIS — R19.7 DIARRHEA, UNSPECIFIED TYPE: Primary | ICD-10-CM

## 2021-11-01 DIAGNOSIS — R19.5 MUCUS IN STOOL: ICD-10-CM

## 2021-11-01 RX ORDER — OLMESARTAN MEDOXOMIL AND HYDROCHLOROTHIAZIDE 40/12.5 40; 12.5 MG/1; MG/1
1 TABLET ORAL DAILY
Qty: 90 TABLET | Refills: 1 | Status: SHIPPED | OUTPATIENT
Start: 2021-11-01 | End: 2022-06-03

## 2021-11-01 RX ORDER — HYDROCODONE BITARTRATE AND ACETAMINOPHEN 7.5; 325 MG/1; MG/1
1 TABLET ORAL EVERY 6 HOURS PRN
Qty: 120 TABLET | Refills: 0 | Status: SHIPPED | OUTPATIENT
Start: 2021-11-01 | End: 2021-11-29 | Stop reason: SDUPTHER

## 2021-11-01 RX ORDER — DIPHENOXYLATE HYDROCHLORIDE AND ATROPINE SULFATE 2.5; .025 MG/1; MG/1
1 TABLET ORAL 4 TIMES DAILY PRN
Qty: 20 TABLET | Refills: 1 | Status: SHIPPED | OUTPATIENT
Start: 2021-11-01 | End: 2022-02-28

## 2021-11-29 DIAGNOSIS — M54.42 CHRONIC BILATERAL LOW BACK PAIN WITH BILATERAL SCIATICA: ICD-10-CM

## 2021-11-29 DIAGNOSIS — I10 ESSENTIAL HYPERTENSION: ICD-10-CM

## 2021-11-29 DIAGNOSIS — M54.41 CHRONIC BILATERAL LOW BACK PAIN WITH BILATERAL SCIATICA: ICD-10-CM

## 2021-11-29 DIAGNOSIS — E03.9 HYPOTHYROIDISM, UNSPECIFIED TYPE: ICD-10-CM

## 2021-11-29 DIAGNOSIS — G89.29 CHRONIC BILATERAL LOW BACK PAIN WITH BILATERAL SCIATICA: ICD-10-CM

## 2021-11-29 RX ORDER — HYDROCODONE BITARTRATE AND ACETAMINOPHEN 7.5; 325 MG/1; MG/1
1 TABLET ORAL EVERY 6 HOURS PRN
Qty: 120 TABLET | Refills: 0 | Status: SHIPPED | OUTPATIENT
Start: 2021-11-29 | End: 2022-01-03 | Stop reason: SDUPTHER

## 2021-11-29 RX ORDER — LEVOTHYROXINE SODIUM 137 UG/1
137 TABLET ORAL DAILY
Qty: 90 TABLET | Refills: 2 | Status: SHIPPED | OUTPATIENT
Start: 2021-11-29 | End: 2022-07-20 | Stop reason: SDUPTHER

## 2021-11-29 RX ORDER — AMLODIPINE BESYLATE 5 MG/1
5 TABLET ORAL DAILY
Qty: 90 TABLET | Refills: 2 | Status: SHIPPED | OUTPATIENT
Start: 2021-11-29 | End: 2022-06-30 | Stop reason: SDUPTHER

## 2021-12-06 RX ORDER — CYANOCOBALAMIN 1000 UG/ML
INJECTION, SOLUTION INTRAMUSCULAR; SUBCUTANEOUS
Qty: 1 ML | Refills: 5 | Status: SHIPPED | OUTPATIENT
Start: 2021-12-06 | End: 2022-01-03 | Stop reason: SDUPTHER

## 2021-12-22 ENCOUNTER — TELEMEDICINE (OUTPATIENT)
Dept: FAMILY MEDICINE CLINIC | Facility: CLINIC | Age: 71
End: 2021-12-22

## 2021-12-22 VITALS — WEIGHT: 220 LBS | HEIGHT: 67 IN | BODY MASS INDEX: 34.53 KG/M2

## 2021-12-22 DIAGNOSIS — F32.1 CURRENT MODERATE EPISODE OF MAJOR DEPRESSIVE DISORDER WITHOUT PRIOR EPISODE (HCC): ICD-10-CM

## 2021-12-22 DIAGNOSIS — E66.01 CLASS 2 SEVERE OBESITY WITH SERIOUS COMORBIDITY AND BODY MASS INDEX (BMI) OF 36.0 TO 36.9 IN ADULT, UNSPECIFIED OBESITY TYPE (HCC): ICD-10-CM

## 2021-12-22 PROCEDURE — 99214 OFFICE O/P EST MOD 30 MIN: CPT | Performed by: FAMILY MEDICINE

## 2021-12-22 RX ORDER — ESCITALOPRAM OXALATE 10 MG/1
10 TABLET ORAL DAILY
Qty: 30 TABLET | Refills: 5
Start: 2021-12-22 | End: 2022-05-31 | Stop reason: SDUPTHER

## 2021-12-22 NOTE — PROGRESS NOTES
"Subjective    Seen today by video visit due to the Covid-19 quarantine.   You have chosen to receive care through a telehealth visit.  Do you consent to use a video/audio connection for your medical care today? Yes    Marielle Hansen is a 69 y.o. female with hypertension hypothyroidism vitamin D deficiency and menopausal symptoms seeing me today for trouble with her anxiety.  She is taking Lexapro but admits she does not take it regularly.  She says that she breaks it in half and takes half tablet here and there when she feels like she needs it but has never taken it steadily.  I explained to her that it is a medication that she will need to keep in her system and take regularly on a daily basis and give it at least 2 weeks to kick in before she will really be able to tell how its going to do for her.  She said that other medicines like Effexor in the past it caused her to \"zoning out\" and she was afraid this would do the same.  I explained to her that I chose this 1 in hopes that it would be mild and not cause this.  She has lost a  and a daughter over the past few years and had a lot of other stressors and continues to have a lot of depression    History of Present Illness   The following portions of the patient's history were reviewed and updated as appropriate: allergies, current medications, past family history, past medical history, past social history, past surgical history and problem list.  Depression   Visit Type: follow-up  Patient presents with the following symptoms: anhedonia, decreased concentration, depressed mood, excessive worry, fatigue, muscle tension and nervousness/anxiety.  Patient is not experiencing: chest pain, choking sensation, compulsions, confusion, dizziness, dry mouth, feelings of hopelessness, feelings of worthlessness, hypersomnia, hyperventilation, impotence, irritability, malaise, memory impairment, nausea, obsessions, palpitations, panic, psychomotor agitation, " psychomotor retardation, restlessness, shortness of breath, suicidal ideas, suicidal planning, thoughts of death, weight gain and weight loss.  Frequency of symptoms: most days   Severity: causing significant distress   Sleep quality: fair  Nighttime awakenings: occasional  Compliance with medications:  %      Review of Systems   Respiratory: Negative.    Cardiovascular: Negative.    Genitourinary: Negative for hematuria and vaginal bleeding.   Musculoskeletal: Positive for gait problem.   Skin: Negative.    Allergic/Immunologic: Negative for immunocompromised state.   Neurological: Negative for dizziness, tremors, seizures, syncope and light-headedness.   Hematological: Negative.    Psychiatric/Behavioral: Positive for sleep disturbance. Negative for agitation and confusion. The patient is nervous/anxious.    All other systems reviewed and are negative.    Objective    Vitals:    12/22/21 1047   PainSc:   5   PainLoc: Back   There is no height or weight on file to calculate BMI.    Physical Exam   Constitutional: She is oriented to person, place, and time. She appears well-developed.   Obese.   HENT:   Head: Normocephalic and atraumatic.   Nose: Nose normal.   Eyes: Pupils are equal, round, and reactive to light. Conjunctivae are normal.   Neck: No JVD present. No tracheal deviation present. No thyromegaly present.   Cardiovascular: Normal rate, regular rhythm and normal heart sounds.   No murmur heard.  Pulmonary/Chest: Effort normal and breath sounds normal. She has no wheezes.   Abdominal: Soft. Bowel sounds are normal. She exhibits no distension. There is no abdominal tenderness.   Musculoskeletal: Normal range of motion.      Comments: Patient has very stiff gait, some mild tenderness on the right low back.   Lymphadenopathy:     She has no cervical adenopathy.   Neurological: She is alert and oriented to person, place, and time. Coordination normal.   Skin: Skin is warm and dry. No rash noted.   Nursing  note and vitals reviewed.      Assessment/Plan   Diagnoses and all orders for this visit:    1. Current moderate episode of major depressive disorder without prior episode (HCC)    2. Class 2 severe obesity with serious comorbidity and body mass index (BMI) of 36.0 to 36.9 in adult, unspecified obesity type (HCC)    Other orders  -     escitalopram (LEXAPRO) 10 MG tablet; Take 1 tablet by mouth Daily.  Dispense: 30 tablet; Refill: 5    After spending some time talking to the patient, she and I agree that she would try the Lexapro again.  She said that she really had never given it a chance and did not know if it would make her groggy or not.  I advised her that it anytime if she feels that she is having side effects from the medication she can call me for a change and after 2 weeks if she does not feel like it has kicked in a started helping with the depression symptoms please call for medication changes well.  We may want to consider the pharmacogenetics testing.    Discussed the patient's BMI with her. BMI is above normal parameters. Follow-up plan includes:  educational material and nutrition counseling.    I spent 30 minutes caring for Delores on this date of service. This time includes time spent by me in the following activities:preparing for the visit, obtaining and/or reviewing a separately obtained history, performing a medically appropriate examination and/or evaluation , counseling and educating the patient/family/caregiver, ordering medications, tests, or procedures and documenting information in the medical record          This document has been electronically signed by Yoselin Rubio MD on December 22, 2021 11:23 CST

## 2022-01-03 DIAGNOSIS — G89.29 CHRONIC BILATERAL LOW BACK PAIN WITH BILATERAL SCIATICA: ICD-10-CM

## 2022-01-03 DIAGNOSIS — M54.42 CHRONIC BILATERAL LOW BACK PAIN WITH BILATERAL SCIATICA: ICD-10-CM

## 2022-01-03 DIAGNOSIS — M54.41 CHRONIC BILATERAL LOW BACK PAIN WITH BILATERAL SCIATICA: ICD-10-CM

## 2022-01-03 RX ORDER — HYDROCODONE BITARTRATE AND ACETAMINOPHEN 7.5; 325 MG/1; MG/1
1 TABLET ORAL EVERY 6 HOURS PRN
Qty: 120 TABLET | Refills: 0 | Status: SHIPPED | OUTPATIENT
Start: 2022-01-03 | End: 2022-01-31 | Stop reason: SDUPTHER

## 2022-01-03 RX ORDER — SYRINGE W-NEEDLE,DISPOSAB,3 ML 25GX5/8"
SYRINGE, EMPTY DISPOSABLE MISCELLANEOUS
Qty: 1 EACH | Refills: 5 | Status: SHIPPED | OUTPATIENT
Start: 2022-01-03 | End: 2022-09-20

## 2022-01-03 RX ORDER — CYANOCOBALAMIN 1000 UG/ML
1000 INJECTION, SOLUTION INTRAMUSCULAR; SUBCUTANEOUS
Qty: 1 ML | Refills: 5 | Status: SHIPPED | OUTPATIENT
Start: 2022-01-03 | End: 2022-03-01 | Stop reason: SDUPTHER

## 2022-01-07 DIAGNOSIS — M54.50 LOW BACK PAIN, UNSPECIFIED BACK PAIN LATERALITY, UNSPECIFIED CHRONICITY, UNSPECIFIED WHETHER SCIATICA PRESENT: ICD-10-CM

## 2022-01-07 DIAGNOSIS — M25.551 RIGHT HIP PAIN: Primary | ICD-10-CM

## 2022-01-11 ENCOUNTER — OFFICE VISIT (OUTPATIENT)
Dept: ORTHOPEDIC SURGERY | Facility: CLINIC | Age: 72
End: 2022-01-11

## 2022-01-11 VITALS — WEIGHT: 220 LBS | OXYGEN SATURATION: 95 % | BODY MASS INDEX: 34.53 KG/M2 | HEART RATE: 75 BPM | HEIGHT: 67 IN

## 2022-01-11 DIAGNOSIS — M54.50 CHRONIC MIDLINE LOW BACK PAIN WITHOUT SCIATICA: ICD-10-CM

## 2022-01-11 DIAGNOSIS — G89.29 CHRONIC MIDLINE LOW BACK PAIN WITHOUT SCIATICA: ICD-10-CM

## 2022-01-11 DIAGNOSIS — M25.551 RIGHT HIP PAIN: ICD-10-CM

## 2022-01-11 DIAGNOSIS — M16.11 PRIMARY OSTEOARTHRITIS OF RIGHT HIP: Primary | ICD-10-CM

## 2022-01-11 DIAGNOSIS — M25.552 BILATERAL HIP PAIN: Primary | ICD-10-CM

## 2022-01-11 DIAGNOSIS — I10 ESSENTIAL HYPERTENSION: ICD-10-CM

## 2022-01-11 DIAGNOSIS — M16.12 PRIMARY OSTEOARTHRITIS OF LEFT HIP: ICD-10-CM

## 2022-01-11 DIAGNOSIS — M48.061 SPINAL STENOSIS OF LUMBAR REGION WITHOUT NEUROGENIC CLAUDICATION: ICD-10-CM

## 2022-01-11 DIAGNOSIS — M25.552 LEFT HIP PAIN: ICD-10-CM

## 2022-01-11 DIAGNOSIS — M25.551 BILATERAL HIP PAIN: Primary | ICD-10-CM

## 2022-01-11 PROCEDURE — 99213 OFFICE O/P EST LOW 20 MIN: CPT | Performed by: ORTHOPAEDIC SURGERY

## 2022-01-11 RX ORDER — TRAZODONE HYDROCHLORIDE 150 MG/1
150 TABLET ORAL NIGHTLY
Qty: 30 TABLET | Refills: 5 | Status: SHIPPED | OUTPATIENT
Start: 2022-01-11 | End: 2022-07-20 | Stop reason: SDUPTHER

## 2022-01-11 NOTE — PROGRESS NOTES
Marielle Hansen is a 71 y.o. female   Primary provider:  Yoselin Rubio MD       Chief Complaint   Patient presents with   • Right Hip - Pain, Initial Evaluation   • Left Hip - Pain, Initial Evaluation     X-rays done today in office  HISTORY OF PRESENT ILLNESS:Bilateral hip pain.  Pain scale today 0/10  Patient complains of pain in both hips occasionally.  She has done 5 weeks of physical therapy and has had shots in her back per pain management.  She has had nerve root ablation in her lower back as well.  She occasionally has pain in her groin.  Dominantly her pain is low back and buttock pain.  She has seen a neurosurgeon in [place] who did not recommend any surgical intervention for her back.  She denies any numbness or tingling and denies any specific injury.      Pain  This is a chronic problem. The current episode started more than 1 year ago. The problem occurs intermittently (moderate). The problem has been gradually worsening. Associated symptoms include joint swelling. Associated symptoms comments: Stabbing pain. The symptoms are aggravated by standing. She has tried nothing for the symptoms. The treatment provided no relief.        CONCURRENT MEDICAL HISTORY:    Past Medical History:   Diagnosis Date   • Arthritis    • Disease of thyroid gland    • GERD (gastroesophageal reflux disease)    • Hyperlipidemia    • Hypertension    • TIA (transient ischemic attack)        Allergies   Allergen Reactions   • Codeine Hives         Current Outpatient Medications:   •  albuterol sulfate  (90 Base) MCG/ACT inhaler, Inhale 2 puffs Every 4 (Four) Hours As Needed for Wheezing., Disp: 18 g, Rfl: 5  •  amLODIPine (NORVASC) 5 MG tablet, Take 1 tablet by mouth Daily., Disp: 90 tablet, Rfl: 2  •  Cholecalciferol (Vitamin D3) 1.25 MG (67361 UT) capsule, Take 1 capsule by mouth 2 (Two) Times a Week., Disp: 8 capsule, Rfl: 5  •  colchicine 0.6 MG tablet, 2 tablets by mouth this morning, 1 tablet an hour  "later.  Then 1 tablet BID, Disp: 60 tablet, Rfl: 5  •  cyanocobalamin 1000 MCG/ML injection, Inject 1 mL into the appropriate muscle as directed by prescriber Every 30 (Thirty) Days., Disp: 1 mL, Rfl: 5  •  Diclofenac Sodium (VOLTAREN) 1 % gel gel, Apply to painful joints BID x 2 wks then PRN, Disp: , Rfl:   •  diphenoxylate-atropine (Lomotil) 2.5-0.025 MG per tablet, Take 1 tablet by mouth 4 (Four) Times a Day As Needed for Diarrhea., Disp: 20 tablet, Rfl: 1  •  DRYSOL 20 % external solution, Daily., Disp: , Rfl:   •  escitalopram (LEXAPRO) 10 MG tablet, Take 1 tablet by mouth Daily., Disp: 30 tablet, Rfl: 5  •  gabapentin (NEURONTIN) 300 MG capsule, TAKE 1 CAPSULE BY MOUTH THREE TIMES DAILY, Disp: 90 capsule, Rfl: 0  •  HYDROcodone-acetaminophen (Norco) 7.5-325 MG per tablet, Take 1 tablet by mouth Every 6 (Six) Hours As Needed for Moderate Pain ., Disp: 120 tablet, Rfl: 0  •  levothyroxine (SYNTHROID, LEVOTHROID) 137 MCG tablet, Take 1 tablet by mouth Daily., Disp: 90 tablet, Rfl: 2  •  lovastatin (MEVACOR) 40 MG tablet, Take 1 tablet by mouth once daily, Disp: 90 tablet, Rfl: 0  •  metoprolol succinate XL (TOPROL-XL) 100 MG 24 hr tablet, Take 1 tablet by mouth Daily., Disp: 90 tablet, Rfl: 2  •  olmesartan-hydrochlorothiazide (BENICAR HCT) 40-12.5 MG per tablet, Take 1 tablet by mouth Daily., Disp: 90 tablet, Rfl: 1  •  ondansetron ODT (Zofran ODT) 4 MG disintegrating tablet, Place 1 tablet on the tongue Every 8 (Eight) Hours As Needed for Nausea or Vomiting., Disp: 30 tablet, Rfl: 5  •  Syringe 22G X 1\" 3 ML misc, Use to inject B-12 injection into appropriate muscle, Disp: 1 each, Rfl: 5  •  traZODone (DESYREL) 150 MG tablet, Take 1 tablet by mouth Every Night., Disp: 30 tablet, Rfl: 5    Past Surgical History:   Procedure Laterality Date   • APPENDECTOMY     • CHOLECYSTECTOMY     • COLONOSCOPY N/A 1/30/2020    Procedure: COLONOSCOPY;  Surgeon: Ashlyn King MD;  Location: [facility] ENDOSCOPY;  Service: " "Gastroenterology   • CYSTOSCOPY, URETEROSCOPY, RETROGRADE PYELOGRAM, STENT INSERTION Left 7/2/2020    Procedure: CYSTOSCOPY LEFT URETEROSCOPY RETROGRADE PYELOGRAM STENT INSERTION;  Surgeon: Johnny Chino MD;  Location: [facility];  Service: Urology;  Laterality: Left;   • ENDOSCOPY N/A 1/30/2020    Procedure: ESOPHAGOGASTRODUODENOSCOPY;  Surgeon: Ashlyn King MD;  Location: [facility] ENDOSCOPY;  Service: Gastroenterology   • EXTRACORPOREAL SHOCKWAVE LITHOTRIPSY (ESWL), STENT INSERTION/REMOVAL Left 6/25/2020    Procedure: LEFT EXTRACORPOREAL SHOCKWAVE LITHOTRIPSY;  Surgeon: Johnny Chino MD;  Location: [facility] OR;  Service: Urology;  Laterality: Left;   • HYSTERECTOMY     • TOTAL KNEE ARTHROPLASTY Bilateral        History reviewed. No pertinent family history.    Social History     Socioeconomic History   • Marital status:    Tobacco Use   • Smoking status: Former Smoker   • Smokeless tobacco: Never Used   Substance and Sexual Activity   • Alcohol use: No   • Drug use: Never   • Sexual activity: Defer        Review of Systems   Constitutional: Positive for unexpected weight change.   Respiratory: Positive for wheezing.         Breathing difficulties     Gastrointestinal: Positive for diarrhea.   Musculoskeletal: Positive for joint swelling.        Stiffness low back pain, hip pain   Psychiatric/Behavioral: Positive for sleep disturbance. The patient is nervous/anxious.    All other systems reviewed and are negative.      PHYSICAL EXAMINATION:       Pulse 75   Ht 170.2 cm (67\")   Wt 99.8 kg (220 lb)   SpO2 95%   BMI 34.46 kg/m²     Physical Exam  Constitutional:       General: She is not in acute distress.     Appearance: Normal appearance.   Pulmonary:      Effort: Pulmonary effort is normal. No respiratory distress.   Neurological:      Mental Status: She is alert and oriented to person, place, and time.         GAIT:     []  Normal  []  Antalgic    Assistive device: []  None  []  Walker     []  " Crutches  []  Cane     []  Wheelchair  []  Stretcher    Right Hip Exam     Tenderness   The patient is experiencing no tenderness.     Range of Motion   External rotation: 40   Internal rotation: 10     Muscle Strength   Flexion: 4/5     Comments:  Mildly positive Stinchfield test.  Mildly positive logroll test.      Left Hip Exam     Tenderness   The patient is experiencing no tenderness.     Range of Motion   External rotation: 40   Internal rotation: 10     Muscle Strength   Flexion: 4/5     Comments:  Mildly positive Stinchfield test.  Mildly positive logroll test.                  XR Spine Lumbar 2 or 3 View    Result Date: 1/11/2022  Narrative: Ordering Provider:  Johnny Rutherford MD Ordering Diagnosis/Indication:  Low back pain, unspecified back pain laterality, unspecified chronicity, unspecified whether sciatica present Procedure:  XR SPINE LUMBAR 2 OR 3 VW Exam Date:  1/11/22 COMPARISON:  Not applicable, no relevant images available.     Impression:  3 views of the lumbar spine show acceptable position and alignment with no evidence of acute bony abnormality.  No fracture or dislocation is noted.  She has anterior osteophytes that are present at multiple levels but especially at L1 to and L2-3.  She has significant disc space narrowing at L5-S1.  Some vascular calcification noted as well.  No acute bony abnormality. Mild to moderate diffuse arthritic change noted in the lumbar spine. Johnny Rutherford MD 1/11/22     XR Hips Bilateral With or Without Pelvis 3-4 View    Result Date: 1/11/2022  Narrative: Ordering Provider:  Johnny Rutherford MD Ordering Diagnosis/Indication:  Bilateral hip pain Procedure:  XR HIPS BILATERAL W OR WO PELVIS 3-4 VIEW Exam Date:  1/11/22 COMPARISON:  Not applicable, no relevant images available.     Impression:  AP standing of the pelvis with AP and lateral of both hips show acceptable position and alignment with no evidence of acute bony abnormality in either  hip.  Both hips show moderate arthritic changes with loss of sphericity of the femoral head as well as osteophytes on the superior and inferior margins of the femoral head.  No fracture or dislocation is noted.  Mild arthritic changes noted in the visible portions of the lumbar spine. Johnny Rutherford MD 1/11/22           ASSESSMENT:    Diagnoses and all orders for this visit:    Primary osteoarthritis of right hip    Right hip pain    Left hip pain    Primary osteoarthritis of left hip    Essential hypertension    Spinal stenosis of lumbar region without neurogenic claudication    Chronic midline low back pain without sciatica          PLAN    Predominantly she is having low back pain.  She does have some arthritis changes in her hips but does not have significant groin pain.  She does have facet arthropathy in the lower back.  We discussed continuing with general strength and conditioning exercises.  Slowly progress activity as pain allows.  Continue working with pain management and trying to improve mobility.  Follow-up in 3 months.  Activity as tolerated.    Return in about 3 months (around 4/11/2022) for recheck.    Johnny Rutherford MD

## 2022-01-24 RX ORDER — LOVASTATIN 40 MG/1
TABLET ORAL
Qty: 90 TABLET | Refills: 0 | Status: SHIPPED | OUTPATIENT
Start: 2022-01-24 | End: 2022-02-07 | Stop reason: SDUPTHER

## 2022-01-26 RX ORDER — LOVASTATIN 40 MG/1
TABLET ORAL
Qty: 90 TABLET | Refills: 0 | OUTPATIENT
Start: 2022-01-26

## 2022-01-31 DIAGNOSIS — M54.42 CHRONIC BILATERAL LOW BACK PAIN WITH BILATERAL SCIATICA: ICD-10-CM

## 2022-01-31 DIAGNOSIS — M54.41 CHRONIC BILATERAL LOW BACK PAIN WITH BILATERAL SCIATICA: ICD-10-CM

## 2022-01-31 DIAGNOSIS — G89.29 CHRONIC BILATERAL LOW BACK PAIN WITH BILATERAL SCIATICA: ICD-10-CM

## 2022-01-31 RX ORDER — HYDROCODONE BITARTRATE AND ACETAMINOPHEN 7.5; 325 MG/1; MG/1
1 TABLET ORAL EVERY 6 HOURS PRN
Qty: 120 TABLET | Refills: 0 | Status: SHIPPED | OUTPATIENT
Start: 2022-01-31 | End: 2022-03-01 | Stop reason: SDUPTHER

## 2022-02-07 RX ORDER — LOVASTATIN 40 MG/1
40 TABLET ORAL DAILY
Qty: 90 TABLET | Refills: 2 | Status: SHIPPED | OUTPATIENT
Start: 2022-02-07 | End: 2022-07-20 | Stop reason: SDUPTHER

## 2022-02-16 ENCOUNTER — OFFICE VISIT (OUTPATIENT)
Dept: GASTROENTEROLOGY | Facility: CLINIC | Age: 72
End: 2022-02-16

## 2022-02-16 VITALS
BODY MASS INDEX: 35.38 KG/M2 | SYSTOLIC BLOOD PRESSURE: 147 MMHG | DIASTOLIC BLOOD PRESSURE: 66 MMHG | WEIGHT: 225.4 LBS | HEIGHT: 67 IN | HEART RATE: 69 BPM

## 2022-02-16 DIAGNOSIS — R11.0 NAUSEA: ICD-10-CM

## 2022-02-16 DIAGNOSIS — R19.5 MUCUS IN STOOL: ICD-10-CM

## 2022-02-16 DIAGNOSIS — Z86.010 PERSONAL HISTORY OF COLONIC POLYPS: ICD-10-CM

## 2022-02-16 DIAGNOSIS — R19.4 CHANGE IN BOWEL HABIT: Primary | ICD-10-CM

## 2022-02-16 DIAGNOSIS — R10.84 GENERALIZED ABDOMINAL PAIN: ICD-10-CM

## 2022-02-16 PROCEDURE — 99213 OFFICE O/P EST LOW 20 MIN: CPT | Performed by: NURSE PRACTITIONER

## 2022-02-16 RX ORDER — SODIUM, POTASSIUM,MAG SULFATES 17.5-3.13G
1 SOLUTION, RECONSTITUTED, ORAL ORAL EVERY 12 HOURS
Qty: 354 ML | Refills: 0 | Status: SHIPPED | OUTPATIENT
Start: 2022-02-16 | End: 2022-02-16

## 2022-02-16 RX ORDER — DEXTROSE AND SODIUM CHLORIDE 5; .45 G/100ML; G/100ML
30 INJECTION, SOLUTION INTRAVENOUS CONTINUOUS PRN
Status: CANCELLED | OUTPATIENT
Start: 2022-03-03

## 2022-02-16 NOTE — PATIENT INSTRUCTIONS
"https://www.cancer.org/cancer/colon-rectal-cancer/causes-risks-prevention/risk-factors.html\">   Colorectal Cancer Screening    Colorectal cancer screening is a group of tests that are used to check for colorectal cancer before symptoms develop. Colorectal refers to the colon and rectum. The colon and rectum are located at the end of the digestive tract and carry stool (feces) out of the body.  Who should have screening?  All adults who are 45-75 years old should have screening. Your health care provider may recommend screening before age 45. You will have tests every 1-10 years, depending on your results and the type of screening test. Screening recommendations for adults who are 76-85 years old vary depending on a person's health. People older than age 85 should no longer get colorectal cancer screening.  You may have screening tests starting before age 45, or more often than other people, if you have any of these risk factors:  · A personal or family history of colorectal cancer or abnormal growths known as polyps in your colon.  · Inflammatory bowel disease, such as ulcerative colitis or Crohn's disease.  · A history of having radiation treatment to the abdomen or the area between the hip bones (pelvic area) for cancer.  · A type of genetic syndrome that is passed from parent to child (hereditary), such as:  ? Marcum syndrome.  ? Familial adenomatous polyposis.  ? Turcot syndrome.  ? Peutz-Jeghers syndrome.  ? MUTYH-associated polyposis (MAP).  · A personal history of diabetes.  Types of tests  There are several types of colorectal screening tests. You may have one or more of the following:  · Guaiac-based fecal occult blood testing. For this test, a stool sample is checked for hidden (occult) blood, which could be a sign of colorectal cancer.  · Fecal immunochemical test (FIT). For this test, a stool sample is checked for blood, which could be a sign of colorectal cancer.  · Stool DNA test. For this test, a stool " sample is checked for blood and changes in DNA that could lead to colorectal cancer.  · Sigmoidoscopy. During this test, a thin, flexible tube with a camera on the end, called a sigmoidoscope, is used to examine the rectum and the lower colon.  · Colonoscopy. During this test, a long, flexible tube with a camera on the end, called a colonoscope, is used to examine the entire colon and rectum. Also, sometimes a tissue sample is taken to be looked at under a microscope (biopsy) or small polyps are removed during this test.  · Virtual colonoscopy. Instead of a colonoscope, this type of colonoscopy uses a CT scan to take pictures of the colon and rectum. A CT scan is a type of X-ray that is made using computers.  What are the benefits of screening?  Screening reduces your risk for colorectal cancer and can help identify cancer at an early stage, when the cancer can be removed or treated more easily. It is common for polyps to form in the lining of the colon, especially as you age. These polyps may be cancerous or become cancerous over time. Screening can identify these polyps.  What are the risks of screening?  Generally, these are safe tests. However, problems may occur, including:  · The need for more tests to confirm results from a stool sample test. Stool sample tests have fewer risks than other types of screening tests.  · Being exposed to low levels of radiation, if you had a test involving X-rays. This may slightly increase your cancer risk. The benefit of detecting cancer outweighs the slight increase in risk.  · Bleeding, damage to the intestine, or infection caused by a sigmoidoscopy or colonoscopy.  · A reaction to medicines given during a sigmoidoscopy or colonoscopy.  Talk with your health care provider to understand your risk for colorectal cancer and to make a screening plan that is right for you.  Questions to ask your health care provider  · When should I start colorectal cancer screening?  · What is my  risk for colorectal cancer?  · How often do I need screening?  · Which screening tests do I need?  · How do I get my test results?  · What do my results mean?  Where to find more information  Learn more about colorectal cancer screening from:  · The American Cancer Society: cancer.org  · National Cancer Olar: cancer.gov  Summary  · Colorectal cancer screening is a group of tests used to check for colorectal cancer before symptoms develop.  · All adults who are 45-75 years old should have screening. Your health care provider may recommend screening before age 45.  · You may have screening tests starting before age 45, or more often than other people, if you have certain risk factors.  · Screening reduces your risk for colorectal cancer and can help identify cancer at an early stage, when the cancer can be removed or treated more easily.  · Talk with your health care provider to understand your risk for colorectal cancer and to make a screening plan that is right for you.  This information is not intended to replace advice given to you by your health care provider. Make sure you discuss any questions you have with your health care provider.  Document Revised: 04/07/2021 Document Reviewed: 04/07/2021  Elsevier Patient Education © 2021 [employer]

## 2022-02-16 NOTE — PROGRESS NOTES
Chief Complaint   Patient presents with   • Colon Cancer Screening       Subjective    Marielle Hansen is a 71 y.o. female. she is here today for follow-up.  71-year-old female presents for patient is due for screening colonoscopy due to history of colonic polyps.  Reports she has had a lot of issue with upper abdominal cramping nausea and epigastric pain and had a change in bowel habits.  Reports she always has diarrhea but now has had a lot of mucus mixed within her stool she has tried Bentyl, Lomotil and Imodium which constipated her and she stopped taking.  She underwent gastric emptying study in 2020 which noted very rapid gastric emptying time.  Her previous EGD noted esophagitis hiatal hernia and gastritis.  States anything she eats causes instant diarrhea.  She had her gallbladder removed several years ago    Abdominal Pain  This is a chronic problem. The current episode started more than 1 year ago. The onset quality is gradual. The problem occurs daily. The problem has been gradually worsening. The pain is located in the generalized abdominal region. The pain is severe. The quality of the pain is cramping and a sensation of fullness. Associated symptoms include arthralgias, belching, constipation (if takes bentyl gets constipated ), diarrhea and nausea. Pertinent negatives include no anorexia, dysuria, fever, flatus, headaches, hematochezia, hematuria, melena, myalgias or vomiting. Associated symptoms comments: Mucus in stool .            The following portions of the patient's history were reviewed and updated as appropriate:   Past Medical History:   Diagnosis Date   • Arthritis    • Disease of thyroid gland    • GERD (gastroesophageal reflux disease)    • Hyperlipidemia    • Hypertension    • TIA (transient ischemic attack)      Past Surgical History:   Procedure Laterality Date   • APPENDECTOMY     • CHOLECYSTECTOMY     • COLONOSCOPY N/A 1/30/2020    Procedure: COLONOSCOPY;  Surgeon: Fernando  MD Ashlyn;  Location: [facility] ENDOSCOPY;  Service: Gastroenterology   • CYSTOSCOPY, URETEROSCOPY, RETROGRADE PYELOGRAM, STENT INSERTION Left 7/2/2020    Procedure: CYSTOSCOPY LEFT URETEROSCOPY RETROGRADE PYELOGRAM STENT INSERTION;  Surgeon: Johnny Chino MD;  Location: [facility] OR;  Service: Urology;  Laterality: Left;   • ENDOSCOPY N/A 1/30/2020    Procedure: ESOPHAGOGASTRODUODENOSCOPY;  Surgeon: Ashlyn King MD;  Location: [facility] ENDOSCOPY;  Service: Gastroenterology   • EXTRACORPOREAL SHOCKWAVE LITHOTRIPSY (ESWL), STENT INSERTION/REMOVAL Left 6/25/2020    Procedure: LEFT EXTRACORPOREAL SHOCKWAVE LITHOTRIPSY;  Surgeon: Johnny Chino MD;  Location: [facility] OR;  Service: Urology;  Laterality: Left;   • HYSTERECTOMY     • TOTAL KNEE ARTHROPLASTY Bilateral      History reviewed. No pertinent family history.  OB History    No obstetric history on file.       Prior to Admission medications    Medication Sig Start Date End Date Taking? Authorizing Provider   albuterol sulfate  (90 Base) MCG/ACT inhaler Inhale 2 puffs Every 4 (Four) Hours As Needed for Wheezing. 1/17/20  Yes Yoselin Rubio MD   amLODIPine (NORVASC) 5 MG tablet Take 1 tablet by mouth Daily. 11/29/21  Yes Yoselin Rubio MD   Cholecalciferol (Vitamin D3) 1.25 MG (03396 UT) capsule Take 1 capsule by mouth 2 (Two) Times a Week. 6/10/21  Yes Yoselin Rubio MD   colchicine 0.6 MG tablet 2 tablets by mouth this morning, 1 tablet an hour later.  Then 1 tablet BID 3/18/21  Yes Yoselin Rubio MD   cyanocobalamin 1000 MCG/ML injection Inject 1 mL into the appropriate muscle as directed by prescriber Every 30 (Thirty) Days. 1/3/22  Yes Yoselin Rubio MD   Diclofenac Sodium (VOLTAREN) 1 % gel gel Apply to painful joints BID x 2 wks then PRN 3/16/21  Yes Hilario Jenkins MD   diphenoxylate-atropine (Lomotil) 2.5-0.025 MG per tablet Take 1 tablet by mouth 4 (Four) Times a Day As Needed for Diarrhea. 11/1/21  Yes Yoselin Rubio  "MD   DRYSOL 20 % external solution Daily. 2/6/20  Yes Hilario Jenkins MD   escitalopram (LEXAPRO) 10 MG tablet Take 1 tablet by mouth Daily. 12/22/21  Yes Yoselin Rubio MD   gabapentin (NEURONTIN) 300 MG capsule TAKE 1 CAPSULE BY MOUTH THREE TIMES DAILY 6/1/21  Yes Yoselin Rubio MD   HYDROcodone-acetaminophen (Norco) 7.5-325 MG per tablet Take 1 tablet by mouth Every 6 (Six) Hours As Needed for Moderate Pain . 1/31/22  Yes Yoselin Rubio MD   levothyroxine (SYNTHROID, LEVOTHROID) 137 MCG tablet Take 1 tablet by mouth Daily. 11/29/21  Yes Yoselin Rubio MD   lovastatin (MEVACOR) 40 MG tablet Take 1 tablet by mouth Daily. 2/7/22  Yes Yoselin Rubio MD   metoprolol succinate XL (TOPROL-XL) 100 MG 24 hr tablet Take 1 tablet by mouth Daily. 10/11/21  Yes Yoselin Rubio MD   olmesartan-hydrochlorothiazide (BENICAR HCT) 40-12.5 MG per tablet Take 1 tablet by mouth Daily. 11/1/21  Yes Yoselin Rubio MD   ondansetron ODT (Zofran ODT) 4 MG disintegrating tablet Place 1 tablet on the tongue Every 8 (Eight) Hours As Needed for Nausea or Vomiting. 4/27/21  Yes Yoselin Rubio MD   Syringe 22G X 1\" 3 ML misc Use to inject B-12 injection into appropriate muscle 1/3/22  Yes Yoselin Rubio MD   traZODone (DESYREL) 150 MG tablet Take 1 tablet by mouth Every Night. 1/11/22  Yes Yoselin Rubio MD     Allergies   Allergen Reactions   • Codeine Hives     Social History     Socioeconomic History   • Marital status:    Tobacco Use   • Smoking status: Former Smoker   • Smokeless tobacco: Never Used   Substance and Sexual Activity   • Alcohol use: No   • Drug use: Never   • Sexual activity: Defer       Review of Systems  Review of Systems   Constitutional: Positive for fatigue. Negative for activity change, appetite change, chills, diaphoresis, fever and unexpected weight change.   Gastrointestinal: Positive for abdominal pain, constipation (if takes bentyl gets constipated ), diarrhea and nausea. " "Negative for abdominal distention, anal bleeding, anorexia, blood in stool, flatus, hematochezia, melena, rectal pain and vomiting.   Genitourinary: Negative for dysuria and hematuria.   Musculoskeletal: Positive for arthralgias. Negative for myalgias.   Neurological: Negative for headaches.        /66 (BP Location: Left arm)   Pulse 69   Ht 170.2 cm (67\")   Wt 102 kg (225 lb 6.4 oz)   BMI 35.30 kg/m²     Objective    Physical Exam  Constitutional:       General: She is not in acute distress.     Appearance: Normal appearance. She is normal weight. She is not ill-appearing.   HENT:      Head: Normocephalic and atraumatic.   Pulmonary:      Effort: Pulmonary effort is normal.   Abdominal:      General: Abdomen is flat. Bowel sounds are normal. There is no distension.      Palpations: Abdomen is soft. There is no mass.      Tenderness: There is abdominal tenderness in the epigastric area and left upper quadrant.   Neurological:      Mental Status: She is alert.       Lab on 09/22/2021   Component Date Value Ref Range Status   • Total Cholesterol 09/22/2021 209* 150 - 200 mg/dL Final   • Triglycerides 09/22/2021 365* <=150 mg/dL Final   • HDL Cholesterol 09/22/2021 38* 40 - 59 mg/dL Final   • LDL Cholesterol  09/22/2021 108* <=100 mg/dL Final   • VLDL Cholesterol 09/22/2021 63* 5 - 40 mg/dL Final   • LDL/HDL Ratio 09/22/2021 2.58  0.00 - 3.22 Final   • WBC 09/22/2021 10.13  3.40 - 10.80 10*3/mm3 Final   • RBC 09/22/2021 4.57  3.77 - 5.28 10*6/mm3 Final   • Hemoglobin 09/22/2021 13.7  12.0 - 15.9 g/dL Final   • Hematocrit 09/22/2021 41.5  34.0 - 46.6 % Final   • MCV 09/22/2021 90.8  79.0 - 97.0 fL Final   • MCH 09/22/2021 30.0  26.6 - 33.0 pg Final   • MCHC 09/22/2021 33.0  31.5 - 35.7 g/dL Final   • RDW 09/22/2021 14.5  12.3 - 15.4 % Final   • RDW-SD 09/22/2021 46.8  37.0 - 54.0 fl Final   • MPV 09/22/2021 9.5  6.0 - 12.0 fL Final   • Platelets 09/22/2021 229  140 - 450 10*3/mm3 Final   • Neutrophil % " 09/22/2021 66.1  42.7 - 76.0 % Final   • Lymphocyte % 09/22/2021 23.6  19.6 - 45.3 % Final   • Monocyte % 09/22/2021 7.7  5.0 - 12.0 % Final   • Eosinophil % 09/22/2021 2.0  0.3 - 6.2 % Final   • Basophil % 09/22/2021 0.6  0.0 - 1.5 % Final   • Neutrophils, Absolute 09/22/2021 6.70  1.70 - 7.00 10*3/mm3 Final   • Lymphocytes, Absolute 09/22/2021 2.39  0.70 - 3.10 10*3/mm3 Final   • Monocytes, Absolute 09/22/2021 0.78  0.10 - 0.90 10*3/mm3 Final   • Eosinophils, Absolute 09/22/2021 0.20  0.00 - 0.40 10*3/mm3 Final   • Basophils, Absolute 09/22/2021 0.06  0.00 - 0.20 10*3/mm3 Final     Assessment/Plan      1. Change in bowel habit    2. Mucus in stool    3. Personal history of colonic polyps    4. Generalized abdominal pain    5. Nausea    .   EGD due to worsening upper abdominal pain nausea and chronic reflux along with hiatal hernia will obtain biopsy to rule out H. pylori gastritis due to worsening symptoms.  Colonoscopy due to change in bowel habit increased mucus in stool and personal history of colonic polyps.  Will obtain biopsy to rule out microscopic colitis.  Discussed trial of cholestyramine due to possible bile acid diarrhea patient declines to try for fear of causing constipation    Orders placed during this encounter include:  Orders Placed This Encounter   Procedures   • COVID PRE-OP / PRE-PROCEDURE SCREENING ORDER (NO ISOLATION) - Swab, Nasopharynx     Standing Status:   Future     Standing Expiration Date:   2/16/2023     Order Specific Question:   Release to patient     Answer:   Immediate     Order Specific Question:   Please select your location:     Answer:   [facility]     Order Specific Question:   COVID Screening Order:     Answer:   In-House: PRE-OP: BD MAX, 8-10 HR TAT [[id number]]     Order Specific Question:   Previously tested for COVID-19?     Answer:   Yes     Order Specific Question:   Employed in healthcare setting?     Answer:   No     Order Specific Question:   Symptomatic for  COVID-19 as defined by CDC?     Answer:   No     Order Specific Question:   Hospitalized for COVID-19?     Answer:   No     Order Specific Question:   Admitted to ICU for COVID-19?     Answer:   No     Order Specific Question:   Resident in a congregate (group) care setting?     Answer:   No     Order Specific Question:   Pregnant?     Answer:   No   • Follow Anesthesia Guidelines / Standing Orders     Standing Status:   Future   • Obtain Informed Consent     Standing Status:   Future     Order Specific Question:   Informed Consent Given For     Answer:   ESOPHAGOGASTRODUODENOSCOPY and Colonoscopy       ESOPHAGOGASTRODUODENOSCOPY (N/A), COLONOSCOPY (N/A)    Review and/or summary of lab tests, radiology, procedures, medications. Review and summary of old records and obtaining of history. The risks and benefits of my recommendations, as well as other treatment options were discussed with the patient today. Questions were answered.    No orders of the defined types were placed in this encounter.      Follow-up: Return in about 4 weeks (around 3/16/2022) for Recheck, After test.          This document has been electronically signed by BIBI Zazueta on February 16, 2022 14:48 CST           I spent 22 minutes caring for Marielle on this date of service. This time includes time spent by me in the following activities:preparing for the visit, reviewing tests, obtaining and/or reviewing a separately obtained history, performing a medically appropriate examination and/or evaluation , counseling and educating the patient/family/caregiver, ordering medications, tests, or procedures, referring and communicating with other health care professionals , documenting information in the medical record and care coordination    Results for orders placed or performed in visit on 09/22/21   CBC Auto Differential    Specimen: Blood   Result Value Ref Range    WBC 10.13 3.40 - 10.80 10*3/mm3    RBC 4.57 3.77 - 5.28 10*6/mm3    Hemoglobin  13.7 12.0 - 15.9 g/dL    Hematocrit 41.5 34.0 - 46.6 %    MCV 90.8 79.0 - 97.0 fL    MCH 30.0 26.6 - 33.0 pg    MCHC 33.0 31.5 - 35.7 g/dL    RDW 14.5 12.3 - 15.4 %    RDW-SD 46.8 37.0 - 54.0 fl    MPV 9.5 6.0 - 12.0 fL    Platelets 229 140 - 450 10*3/mm3    Neutrophil % 66.1 42.7 - 76.0 %    Lymphocyte % 23.6 19.6 - 45.3 %    Monocyte % 7.7 5.0 - 12.0 %    Eosinophil % 2.0 0.3 - 6.2 %    Basophil % 0.6 0.0 - 1.5 %    Neutrophils, Absolute 6.70 1.70 - 7.00 10*3/mm3    Lymphocytes, Absolute 2.39 0.70 - 3.10 10*3/mm3    Monocytes, Absolute 0.78 0.10 - 0.90 10*3/mm3    Eosinophils, Absolute 0.20 0.00 - 0.40 10*3/mm3    Basophils, Absolute 0.06 0.00 - 0.20 10*3/mm3   Lipid Panel    Specimen: Blood   Result Value Ref Range    Total Cholesterol 209 (H) 150 - 200 mg/dL    Triglycerides 365 (H) <=150 mg/dL    HDL Cholesterol 38 (L) 40 - 59 mg/dL    LDL Cholesterol  108 (H) <=100 mg/dL    VLDL Cholesterol 63 (H) 5 - 40 mg/dL    LDL/HDL Ratio 2.58 0.00 - 3.22   Results for orders placed or performed in visit on 09/22/21   Vitamin D 25 Hydroxy    Specimen: Blood   Result Value Ref Range    25 Hydroxy, Vitamin D 56.8 30.0 - 100.0 ng/ml   TSH    Specimen: Blood   Result Value Ref Range    TSH 1.980 0.270 - 4.200 uIU/mL   T4, Free    Specimen: Blood   Result Value Ref Range    Free T4 1.13 0.93 - 1.70 ng/dL   Comprehensive Metabolic Panel    Specimen: Blood   Result Value Ref Range    Glucose 120 (H) 70 - 99 mg/dL    BUN 18 7 - 23 mg/dL    Creatinine 1.02 0.52 - 1.04 mg/dL    Sodium 140 137 - 145 mmol/L    Potassium 4.2 3.4 - 5.0 mmol/L    Chloride 107 101 - 112 mmol/L    CO2 24.0 22.0 - 30.0 mmol/L    Calcium 9.4 8.4 - 10.2 mg/dL    Total Protein 7.7 6.3 - 8.6 g/dL    Albumin 4.40 3.50 - 5.00 g/dL    ALT (SGPT) 43 (H) <=35 U/L    AST (SGOT) 55 (H) 14 - 36 U/L    Alkaline Phosphatase 51 38 - 126 U/L    Total Bilirubin 0.5 0.2 - 1.3 mg/dL    eGFR Non African Amer 53 39 - 90 mL/min/1.73    Globulin 3.3 2.3 - 3.5 gm/dL    A/G Ratio  1.3 1.1 - 1.8 g/dL    BUN/Creatinine Ratio 17.6 7.0 - 25.0    Anion Gap 9.0 5.0 - 15.0 mmol/L   Results for orders placed or performed in visit on 06/09/21   Urinalysis With Culture If Indicated - Urine, Clean Catch    Specimen: Urine, Clean Catch   Result Value Ref Range    Color, UA Yellow Yellow, Straw    Appearance, UA Cloudy (A) Clear    pH, UA 5.5 5.5 - 8.0    Specific Gravity, UA >=1.030 1.005 - 1.030    Glucose, UA Negative Negative    Ketones, UA Negative Negative    Bilirubin, UA Negative Negative    Blood, UA Negative Negative    Protein, UA Negative Negative    Leuk Esterase, UA Negative Negative    Nitrite, UA Negative Negative    Urobilinogen, UA 0.2 E.U./dL 0.2 - 1.0 E.U./dL   Results for orders placed or performed in visit on 03/18/21   Uric acid    Specimen: Blood   Result Value Ref Range    Uric Acid 8.7 (H) 2.5 - 6.2 mg/dL   Basic Metabolic Panel    Specimen: Blood   Result Value Ref Range    Glucose 168 (H) 70 - 99 mg/dL    BUN 15 7 - 23 mg/dL    Creatinine 1.07 (H) 0.52 - 1.04 mg/dL    Sodium 141 137 - 145 mmol/L    Potassium 4.3 3.4 - 5.0 mmol/L    Chloride 101 101 - 112 mmol/L    CO2 30.0 22.0 - 30.0 mmol/L    Calcium 9.6 8.4 - 10.2 mg/dL    eGFR Non African Amer 51 39 - 90 mL/min/1.73    BUN/Creatinine Ratio 14.0 7.0 - 25.0    Anion Gap 10.0 5.0 - 15.0 mmol/L   Results for orders placed or performed during the hospital encounter of 07/01/20   Gold Top - SST   Result Value Ref Range    Extra Tube Hold for add-ons.    COVID-19, [facility] IN-HOUSE, NP SWAB IN TRANSPORT MEDIA 8-10 HR TAT - Swab, Nasopharynx    Specimen: Nasopharynx; Swab   Result Value Ref Range    COVID19 Not Detected Not Detected - Ref. Range   STONE ANALYSIS - Calculus, Ureter, Left    Specimen: Ureter, Left; Calculus   Result Value Ref Range    Stone Source Comment     Color Tan     Size 2x1 mm    Stone Weight 1 mg    Composition Comment     Carbonate Apatite 70 %    Ca Hydrogen Phos. 30 %    Photo Comment     Comments: Comment      Please note Comment     Disclaimer: Comment    Urinalysis, Microscopic Only - Urine, Clean Catch    Specimen: Urine, Clean Catch   Result Value Ref Range    RBC, UA None Seen None Seen /HPF    WBC, UA 6-12 (A) None Seen, 0-2, 3-5 /HPF    Bacteria, UA 2+ (A) None Seen /HPF    Squamous Epithelial Cells, UA 3-5 (A) None Seen, 0-2 /HPF    Hyaline Casts, UA 31-50 None Seen /LPF    Methodology Manual Light Microscopy    Urinalysis With Culture If Indicated - Urine, Clean Catch    Specimen: Urine, Clean Catch   Result Value Ref Range    Color, UA Dark Yellow Yellow, Straw, Dark Yellow, Sondra    Appearance, UA Cloudy (A) Clear    pH, UA <=5.0 5.0 - 9.0    Specific Gravity, UA 1.039 (H) 1.003 - 1.030    Glucose, UA Negative Negative    Ketones, UA Trace (A) Negative    Bilirubin, UA Moderate (2+) (A) Negative    Blood, UA Negative Negative    Protein, UA 30 mg/dL (1+) (A) Negative    Leuk Esterase, UA Trace (A) Negative    Nitrite, UA Negative Negative    Urobilinogen, UA 1.0 E.U./dL 0.2 - 1.0 E.U./dL     *Note: Due to a large number of results and/or encounters for the requested time period, some results have not been displayed. A complete set of results can be found in Results Review.

## 2022-02-17 RX ORDER — SODIUM, POTASSIUM,MAG SULFATES 17.5-3.13G
SOLUTION, RECONSTITUTED, ORAL ORAL
Qty: 354 ML | Refills: 0 | OUTPATIENT
Start: 2022-02-17

## 2022-03-01 ENCOUNTER — LAB (OUTPATIENT)
Dept: LAB | Facility: HOSPITAL | Age: 72
End: 2022-03-01

## 2022-03-01 DIAGNOSIS — M54.42 CHRONIC BILATERAL LOW BACK PAIN WITH BILATERAL SCIATICA: ICD-10-CM

## 2022-03-01 DIAGNOSIS — G89.29 CHRONIC BILATERAL LOW BACK PAIN WITH BILATERAL SCIATICA: ICD-10-CM

## 2022-03-01 DIAGNOSIS — Z86.010 PERSONAL HISTORY OF COLONIC POLYPS: ICD-10-CM

## 2022-03-01 DIAGNOSIS — M54.41 CHRONIC BILATERAL LOW BACK PAIN WITH BILATERAL SCIATICA: ICD-10-CM

## 2022-03-01 DIAGNOSIS — R19.4 CHANGE IN BOWEL HABIT: ICD-10-CM

## 2022-03-01 DIAGNOSIS — R10.84 GENERALIZED ABDOMINAL PAIN: ICD-10-CM

## 2022-03-01 DIAGNOSIS — R19.5 MUCUS IN STOOL: ICD-10-CM

## 2022-03-01 LAB — SARS-COV-2 N GENE RESP QL NAA+PROBE: NOT DETECTED

## 2022-03-01 PROCEDURE — 87635 SARS-COV-2 COVID-19 AMP PRB: CPT

## 2022-03-01 PROCEDURE — C9803 HOPD COVID-19 SPEC COLLECT: HCPCS

## 2022-03-01 RX ORDER — HYDROCODONE BITARTRATE AND ACETAMINOPHEN 7.5; 325 MG/1; MG/1
1 TABLET ORAL EVERY 6 HOURS PRN
Qty: 120 TABLET | Refills: 0 | Status: SHIPPED | OUTPATIENT
Start: 2022-03-01 | End: 2022-03-31 | Stop reason: SDUPTHER

## 2022-03-01 RX ORDER — CYANOCOBALAMIN 1000 UG/ML
1000 INJECTION, SOLUTION INTRAMUSCULAR; SUBCUTANEOUS
Qty: 1 ML | Refills: 5 | Status: SHIPPED | OUTPATIENT
Start: 2022-03-01 | End: 2022-07-20 | Stop reason: SDUPTHER

## 2022-03-03 ENCOUNTER — ANESTHESIA (OUTPATIENT)
Dept: GASTROENTEROLOGY | Facility: HOSPITAL | Age: 72
End: 2022-03-03

## 2022-03-03 ENCOUNTER — ANESTHESIA EVENT (OUTPATIENT)
Dept: GASTROENTEROLOGY | Facility: HOSPITAL | Age: 72
End: 2022-03-03

## 2022-03-03 ENCOUNTER — HOSPITAL ENCOUNTER (OUTPATIENT)
Facility: HOSPITAL | Age: 72
Setting detail: HOSPITAL OUTPATIENT SURGERY
Discharge: HOME | End: 2022-03-03
Attending: INTERNAL MEDICINE | Admitting: INTERNAL MEDICINE

## 2022-03-03 VITALS
WEIGHT: 221.56 LBS | HEIGHT: 66 IN | BODY MASS INDEX: 35.61 KG/M2 | SYSTOLIC BLOOD PRESSURE: 132 MMHG | OXYGEN SATURATION: 100 % | DIASTOLIC BLOOD PRESSURE: 74 MMHG | RESPIRATION RATE: 18 BRPM | HEART RATE: 84 BPM | TEMPERATURE: 98.6 F

## 2022-03-03 DIAGNOSIS — R10.84 GENERALIZED ABDOMINAL PAIN: ICD-10-CM

## 2022-03-03 DIAGNOSIS — Z86.010 PERSONAL HISTORY OF COLONIC POLYPS: ICD-10-CM

## 2022-03-03 DIAGNOSIS — R19.4 CHANGE IN BOWEL HABIT: ICD-10-CM

## 2022-03-03 DIAGNOSIS — R19.5 MUCUS IN STOOL: ICD-10-CM

## 2022-03-03 PROCEDURE — 43239 EGD BIOPSY SINGLE/MULTIPLE: CPT | Performed by: INTERNAL MEDICINE

## 2022-03-03 PROCEDURE — 45385 COLONOSCOPY W/LESION REMOVAL: CPT | Performed by: INTERNAL MEDICINE

## 2022-03-03 PROCEDURE — 25010000002 PROPOFOL 10 MG/ML EMULSION

## 2022-03-03 PROCEDURE — 88305 TISSUE EXAM BY PATHOLOGIST: CPT

## 2022-03-03 PROCEDURE — 88313 SPECIAL STAINS GROUP 2: CPT

## 2022-03-03 RX ORDER — LIDOCAINE HYDROCHLORIDE 20 MG/ML
INJECTION, SOLUTION INTRAVENOUS AS NEEDED
Status: DISCONTINUED | OUTPATIENT
Start: 2022-03-03 | End: 2022-03-03 | Stop reason: SURG

## 2022-03-03 RX ORDER — DEXTROSE AND SODIUM CHLORIDE 5; .45 G/100ML; G/100ML
30 INJECTION, SOLUTION INTRAVENOUS CONTINUOUS PRN
Status: DISCONTINUED | OUTPATIENT
Start: 2022-03-03 | End: 2022-03-03 | Stop reason: HOSPADM

## 2022-03-03 RX ORDER — PROPOFOL 10 MG/ML
VIAL (ML) INTRAVENOUS AS NEEDED
Status: DISCONTINUED | OUTPATIENT
Start: 2022-03-03 | End: 2022-03-03 | Stop reason: SURG

## 2022-03-03 RX ADMIN — PROPOFOL 10 MG: 10 INJECTION, EMULSION INTRAVENOUS at 14:56

## 2022-03-03 RX ADMIN — PROPOFOL 40 MG: 10 INJECTION, EMULSION INTRAVENOUS at 14:48

## 2022-03-03 RX ADMIN — PROPOFOL 70 MG: 10 INJECTION, EMULSION INTRAVENOUS at 14:37

## 2022-03-03 RX ADMIN — LIDOCAINE HYDROCHLORIDE 100 MG: 20 INJECTION, SOLUTION INTRAVENOUS at 14:37

## 2022-03-03 RX ADMIN — PROPOFOL 20 MG: 10 INJECTION, EMULSION INTRAVENOUS at 14:42

## 2022-03-03 RX ADMIN — PROPOFOL 10 MG: 10 INJECTION, EMULSION INTRAVENOUS at 14:53

## 2022-03-03 RX ADMIN — PROPOFOL 20 MG: 10 INJECTION, EMULSION INTRAVENOUS at 14:59

## 2022-03-03 RX ADMIN — PROPOFOL 10 MG: 10 INJECTION, EMULSION INTRAVENOUS at 14:39

## 2022-03-03 RX ADMIN — PROPOFOL 20 MG: 10 INJECTION, EMULSION INTRAVENOUS at 14:57

## 2022-03-03 RX ADMIN — PROPOFOL 20 MG: 10 INJECTION, EMULSION INTRAVENOUS at 14:54

## 2022-03-03 RX ADMIN — PROPOFOL 20 MG: 10 INJECTION, EMULSION INTRAVENOUS at 14:40

## 2022-03-03 RX ADMIN — PROPOFOL 60 MG: 10 INJECTION, EMULSION INTRAVENOUS at 15:04

## 2022-03-03 RX ADMIN — PROPOFOL 20 MG: 10 INJECTION, EMULSION INTRAVENOUS at 14:41

## 2022-03-03 RX ADMIN — PROPOFOL 20 MG: 10 INJECTION, EMULSION INTRAVENOUS at 14:38

## 2022-03-03 RX ADMIN — PROPOFOL 10 MG: 10 INJECTION, EMULSION INTRAVENOUS at 14:43

## 2022-03-03 RX ADMIN — PROPOFOL 20 MG: 10 INJECTION, EMULSION INTRAVENOUS at 14:51

## 2022-03-03 RX ADMIN — DEXTROSE AND SODIUM CHLORIDE 30 ML/HR: 5; 450 INJECTION, SOLUTION INTRAVENOUS at 13:50

## 2022-03-03 RX ADMIN — PROPOFOL 30 MG: 10 INJECTION, EMULSION INTRAVENOUS at 14:45

## 2022-03-03 NOTE — ANESTHESIA PREPROCEDURE EVALUATION
Anesthesia Evaluation     Patient summary reviewed   NPO Solid Status: > 8 hours  NPO Liquid Status: > 4 hours           Airway   Mallampati: III  TM distance: >3 FB  Neck ROM: full  Possible difficult intubation  Dental    (+) edentulous    Pulmonary - negative pulmonary ROS and normal exam   Cardiovascular - normal exam  Exercise tolerance: good (4-7 METS)    (+) hypertension well controlled less than 2 medications, hyperlipidemia,       Neuro/Psych  (+) TIA, numbness, psychiatric history Anxiety,    GI/Hepatic/Renal/Endo    (+) obesity,  hiatal hernia, GERD well controlled, GI bleeding , renal disease stones, thyroid problem hypothyroidism    Musculoskeletal     Abdominal   (+) obese,    Substance History      OB/GYN          Other   arthritis,                    Anesthesia Plan    ASA 3     MAC     intravenous induction     Anesthetic plan, all risks, benefits, and alternatives have been provided, discussed and informed consent has been obtained with: patient.        CODE STATUS:

## 2022-03-03 NOTE — ANESTHESIA POSTPROCEDURE EVALUATION
Patient: Marielle Hansen    Procedure Summary     Date: 03/03/22 Room / Location: [facility] ENDOSCOPY 2 / [facility] ENDOSCOPY    Anesthesia Start: 1432 Anesthesia Stop: 1510    Procedures:       ESOPHAGOGASTRODUODENOSCOPY (N/A )      COLONOSCOPY (N/A ) Diagnosis:       Change in bowel habit      Mucus in stool      Personal history of colonic polyps      Generalized abdominal pain      (Change in bowel habit [R19.4])      (Mucus in stool [R19.5])      (Personal history of colonic polyps [Z86.010])      (Generalized abdominal pain [R10.84])    Surgeons: Ashlyn King MD Provider: Arnie Barrera CRNA    Anesthesia Type: MAC ASA Status: 3          Anesthesia Type: MAC    Vitals  No vitals data found for the desired time range.          Post Anesthesia Care and Evaluation    Patient location during evaluation: PHASE II  Patient participation: complete - patient cannot participate  Level of consciousness: sleepy but conscious  Pain score: 0  Pain management: adequate  Airway patency: patent  Anesthetic complications: No anesthetic complications  PONV Status: none  Cardiovascular status: acceptable  Respiratory status: acceptable  Hydration status: acceptable  No anesthesia care post op

## 2022-03-09 LAB
LAB AP CASE REPORT: NORMAL
PATH REPORT.FINAL DX SPEC: NORMAL

## 2022-03-14 ENCOUNTER — OFFICE VISIT (OUTPATIENT)
Dept: GASTROENTEROLOGY | Facility: CLINIC | Age: 72
End: 2022-03-14

## 2022-03-14 VITALS
HEART RATE: 74 BPM | WEIGHT: 222.2 LBS | DIASTOLIC BLOOD PRESSURE: 72 MMHG | HEIGHT: 67 IN | SYSTOLIC BLOOD PRESSURE: 142 MMHG | BODY MASS INDEX: 34.88 KG/M2

## 2022-03-14 DIAGNOSIS — K44.9 HIATAL HERNIA: ICD-10-CM

## 2022-03-14 DIAGNOSIS — D12.6 ADENOMATOUS POLYP OF COLON, UNSPECIFIED PART OF COLON: ICD-10-CM

## 2022-03-14 DIAGNOSIS — K21.9 GASTROESOPHAGEAL REFLUX DISEASE WITHOUT ESOPHAGITIS: Primary | ICD-10-CM

## 2022-03-14 DIAGNOSIS — K57.90 DIVERTICULOSIS: ICD-10-CM

## 2022-03-14 PROCEDURE — 99213 OFFICE O/P EST LOW 20 MIN: CPT | Performed by: NURSE PRACTITIONER

## 2022-03-14 RX ORDER — FAMOTIDINE 40 MG/1
40 TABLET, FILM COATED ORAL NIGHTLY PRN
Qty: 30 TABLET | Refills: 5 | Status: SHIPPED | OUTPATIENT
Start: 2022-03-14 | End: 2022-07-20 | Stop reason: SDUPTHER

## 2022-03-14 NOTE — PROGRESS NOTES
Chief Complaint   Patient presents with   • Abdominal Pain   • Colon Polyps       Subjective    Marielle Hansen is a 71 y.o. female. she is here today for follow-up.  71-year-old female presents for follow-up after EGD and colonoscopy.  Reports she still has issues with heartburn and frequent diarrhea however declines to try any medication for diarrhea due to fear of constipation.  Reports upper abdominal cramping indigestion nausea unfulfilling she has tried multiple different medications in the past.  Her gastric empty study in 2020 noted very rapid gastric emptying time.    Heartburn  She complains of abdominal pain and heartburn. She reports no belching, no chest pain, no choking, no coughing, no dysphagia, no globus sensation, no hoarse voice, no nausea or no sore throat. This is a chronic problem. The current episode started more than 1 month ago. The problem occurs rarely. The problem has been waxing and waning. The heartburn duration is several minutes. Pertinent negatives include no fatigue. Risk factors include obesity. She has tried a PPI for the symptoms. Past procedures include an EGD.     EGD noted small hiatal hernia grade 2 esophagitis gastritis and normal duodenum.  Duodenal biopsy noted no significant histologic abnormality.  Antrum biopsy noted reactive gastropathy.  GE junction biopsy noted specialized Milligan's mucosa negative for dysplasia.  Colonoscopy noted 15 mm polyp in the cecum resection retrieval was complete multiple sessile polyps in the sigmoid, transverse, ascending colon and cecum resection retrieval were complete.  Multiple medium mouth diverticula found sigmoid nonbleeding hemorrhoids were found.  All polyps were found to be tubular adenoma or hyperplastic.  Repeat is recommended in 6 months to review polypectomy site       The following portions of the patient's history were reviewed and updated as appropriate:   Past Medical History:   Diagnosis Date   • Arthritis    •  Disease of thyroid gland    • Diverticulitis    • GERD (gastroesophageal reflux disease)    • Hiatal hernia    • Hyperlipidemia    • Hypertension    • TIA (transient ischemic attack)      Past Surgical History:   Procedure Laterality Date   • APPENDECTOMY     • CHOLECYSTECTOMY     • COLONOSCOPY N/A 1/30/2020    Procedure: COLONOSCOPY;  Surgeon: Ashlyn King MD;  Location: [facility] ENDOSCOPY;  Service: Gastroenterology   • COLONOSCOPY N/A 3/3/2022    Procedure: COLONOSCOPY;  Surgeon: Ashlyn King MD;  Location: [facility] ENDOSCOPY;  Service: Gastroenterology;  Laterality: N/A;   • CYSTOSCOPY, URETEROSCOPY, RETROGRADE PYELOGRAM, STENT INSERTION Left 7/2/2020    Procedure: CYSTOSCOPY LEFT URETEROSCOPY RETROGRADE PYELOGRAM STENT INSERTION;  Surgeon: Johnny Chino MD;  Location: [facility] OR;  Service: Urology;  Laterality: Left;   • ENDOSCOPY N/A 1/30/2020    Procedure: ESOPHAGOGASTRODUODENOSCOPY;  Surgeon: Ashlyn King MD;  Location: [facility] ENDOSCOPY;  Service: Gastroenterology   • ENDOSCOPY N/A 3/3/2022    Procedure: ESOPHAGOGASTRODUODENOSCOPY;  Surgeon: Ashlyn King MD;  Location: [facility] ENDOSCOPY;  Service: Gastroenterology;  Laterality: N/A;   • EXTRACORPOREAL SHOCKWAVE LITHOTRIPSY (ESWL), STENT INSERTION/REMOVAL Left 6/25/2020    Procedure: LEFT EXTRACORPOREAL SHOCKWAVE LITHOTRIPSY;  Surgeon: Johnny Chino MD;  Location: [facility] OR;  Service: Urology;  Laterality: Left;   • HYSTERECTOMY     • JOINT REPLACEMENT Bilateral    • TOTAL KNEE ARTHROPLASTY Bilateral      History reviewed. No pertinent family history.  OB History    No obstetric history on file.       Prior to Admission medications    Medication Sig Start Date End Date Taking? Authorizing Provider   albuterol sulfate  (90 Base) MCG/ACT inhaler Inhale 2 puffs Every 4 (Four) Hours As Needed for Wheezing. 1/17/20  Yes Yoselin Rubio MD   amLODIPine (NORVASC) 5 MG tablet Take 1 tablet by mouth Daily. 11/29/21  Yes Joanne  "MD Yoselin   Cholecalciferol (Vitamin D3) 1.25 MG (92466 UT) capsule Take 1 capsule by mouth 2 (Two) Times a Week. 6/10/21  Yes Yoselin Rubio MD   colchicine 0.6 MG tablet 2 tablets by mouth this morning, 1 tablet an hour later.  Then 1 tablet BID 3/18/21  Yes Yoselin Rubio MD   cyanocobalamin 1000 MCG/ML injection Inject 1 mL into the appropriate muscle as directed by prescriber Every 30 (Thirty) Days. 3/1/22  Yes Yoselin Rubio MD   DRYSOL 20 % external solution Daily. 2/6/20  Yes Hilario Jenkins MD   escitalopram (LEXAPRO) 10 MG tablet Take 1 tablet by mouth Daily. 12/22/21  Yes Yoselin Rubio MD   HYDROcodone-acetaminophen (Norco) 7.5-325 MG per tablet Take 1 tablet by mouth Every 6 (Six) Hours As Needed for Moderate Pain . 3/1/22  Yes Yoselin Rubio MD   levothyroxine (SYNTHROID, LEVOTHROID) 137 MCG tablet Take 1 tablet by mouth Daily. 11/29/21  Yes Yoselin Rubio MD   lovastatin (MEVACOR) 40 MG tablet Take 1 tablet by mouth Daily. 2/7/22  Yes Yoselin Rubio MD   metoprolol succinate XL (TOPROL-XL) 100 MG 24 hr tablet Take 1 tablet by mouth Daily. 10/11/21  Yes Yoselin Rubio MD   olmesartan-hydrochlorothiazide (BENICAR HCT) 40-12.5 MG per tablet Take 1 tablet by mouth Daily. 11/1/21  Yes Yoselin Rubio MD   ondansetron ODT (Zofran ODT) 4 MG disintegrating tablet Place 1 tablet on the tongue Every 8 (Eight) Hours As Needed for Nausea or Vomiting. 4/27/21  Yes Yoselin Rubio MD   Syringe 22G X 1\" 3 ML misc Use to inject B-12 injection into appropriate muscle 1/3/22  Yes Yoselin Rubio MD   traZODone (DESYREL) 150 MG tablet Take 1 tablet by mouth Every Night. 1/11/22  Yes Yoselin Rubio MD   polyethylene glycol (GoLYTELY) 236 g solution Starting at noon on day prior to procedure, drink 8 ounces every 30 minutes until all gone or stools are clear. May add flavor packet. 2/16/22   Perla Carty APRN     Allergies   Allergen Reactions   • Codeine Hives     Social History " "    Socioeconomic History   • Marital status:    Tobacco Use   • Smoking status: Former Smoker   • Smokeless tobacco: Never Used   • Tobacco comment: quit 20 years ago    Vaping Use   • Vaping Use: Never used   Substance and Sexual Activity   • Alcohol use: No   • Drug use: Never   • Sexual activity: Defer       Review of Systems  Review of Systems   Constitutional: Negative for activity change, appetite change, chills, diaphoresis, fatigue, fever and unexpected weight change.   HENT: Negative for hoarse voice, sore throat and trouble swallowing.    Respiratory: Negative for cough, choking and shortness of breath.    Cardiovascular: Negative for chest pain.   Gastrointestinal: Positive for abdominal pain, diarrhea and heartburn. Negative for abdominal distention, anal bleeding, blood in stool, constipation, dysphagia, nausea, rectal pain and vomiting.   Skin: Negative for pallor.   Neurological: Negative for light-headedness.        /72 (BP Location: Left arm)   Pulse 74   Ht 170.2 cm (67\")   Wt 101 kg (222 lb 3.2 oz)   BMI 34.80 kg/m²     Objective    Physical Exam  Constitutional:       General: She is not in acute distress.     Appearance: Normal appearance. She is normal weight. She is not ill-appearing.   HENT:      Head: Normocephalic and atraumatic.   Pulmonary:      Effort: Pulmonary effort is normal.   Abdominal:      General: Abdomen is flat. Bowel sounds are normal. There is no distension.      Palpations: Abdomen is soft. There is no mass.      Tenderness: There is abdominal tenderness.   Neurological:      Mental Status: She is alert.       Admission on 03/03/2022, Discharged on 03/03/2022   Component Date Value Ref Range Status   • Case Report 03/03/2022    Final                    Value:Surgical Pathology Report                         Case: [id number]                                  Authorizing Provider:  Ashlyn King MD      Collected:           03/03/2022 03:13 PM        "   Ordering Location:     [facility]   Received:            03/04/2022 07:03 AM                                 [place] [facility]                                                     Pathologist:           Cassius Lord MD                                                           Specimens:   1) - Small Intestine, Duodenum, JA                                                                  2) - Gastric, Antrum, JA                                                                            3) - Esophagus, EGJ   JA                                                                            4) - Large Intestine, Transverse Colon, polyps   JA                                                 5) - Large Intestine, Cecum, polyps   JA                                                                                      6) - Large Intestine, Sigmoid Colon, polyps   JA                                                    7) - Large Intestine, Right / Ascending Colon, polyps   JA                                • Final Diagnosis 03/03/2022    Final                    Value:This result contains rich text formatting which cannot be displayed here.     Assessment/Plan      1. Gastroesophageal reflux disease without esophagitis    2. Adenomatous polyp of colon, unspecified part of colon    3. Diverticulosis    4. Hiatal hernia    .   We will start patient on Pepcid for GERD hiatal hernia she prefers to avoid PPI if possible.  Recommend fiber supplement daily for diverticulosis and help bulk the stool  Repeat colonoscopy in 6 months to review polypectomy site due to multiple adenomatous colonic polyps removed throughout the colon    Orders placed during this encounter include:  No orders of the defined types were placed in this encounter.      * Surgery not found *    Review and/or summary of lab tests, radiology, procedures, medications. Review and summary of old records and obtaining of history. The risks and  benefits of my recommendations, as well as other treatment options were discussed with the patient today. Questions were answered.    New Medications Ordered This Visit   Medications   • famotidine (Pepcid) 40 MG tablet     Sig: Take 1 tablet by mouth At Night As Needed for Heartburn.     Dispense:  30 tablet     Refill:  5       Follow-up: Return in about 6 months (around 9/14/2022) for Recheck.          This document has been electronically signed by BIBI Zazueta on March 18, 2022 10:09 CDT           I spent 22 minutes caring for Marielle on this date of service. This time includes time spent by me in the following activities:preparing for the visit, reviewing tests, obtaining and/or reviewing a separately obtained history, performing a medically appropriate examination and/or evaluation , counseling and educating the patient/family/caregiver, ordering medications, tests, or procedures, referring and communicating with other health care professionals , documenting information in the medical record and care coordination    Results for orders placed or performed during the hospital encounter of 03/03/22   Tissue Pathology Exam    Specimen: A: Small Intestine, Duodenum; Tissue    B: Gastric, Antrum; Tissue    C: Esophagus; Tissue    D: Large Intestine, Transverse Colon; Tissue    E: Large Intestine, Cecum; Tissue    F: Large Intestine, Sigmoid Colon; Tissue    G: Large Intestine, Right / Ascending Colon; Tissue   Result Value Ref Range    Case Report       Surgical Pathology Report                         Case: [id number]                                  Authorizing Provider:  Ashlyn King MD      Collected:           03/03/2022 03:13 PM          Ordering Location:     [facility]   Received:            03/04/2022 07:03 AM                                 [place] [facility]                                                     Pathologist:           Cassius Lord MD                                                            Specimens:   1) - Small Intestine, Duodenum, JA                                                                  2) - Gastric, Antrum, JA                                                                            3) - Esophagus, EGJ   JA                                                                            4) - Large Intestine, Transverse Colon, polyps   JA                                                 5) - Large Intestine, Cecum, polyps   JA                                                             6) - Large Intestine, Sigmoid Colon, polyps   JA                                                    7) - Large Intestine, Right / Ascending Colon, polyps   JA                                 Final Diagnosis       See Scanned Report       Results for orders placed or performed in visit on 03/01/22   COVID-19,  [facility] IN-HOUSE, NP SWAB IN TRANSPORT MEDIA 8-10 HR TAT - Swab, Nasopharynx    Specimen: Nasopharynx; Swab   Result Value Ref Range    COVID19 Not Detected Not Detected - Ref. Range   Results for orders placed or performed in visit on 09/22/21   CBC Auto Differential    Specimen: Blood   Result Value Ref Range    WBC 10.13 3.40 - 10.80 10*3/mm3    RBC 4.57 3.77 - 5.28 10*6/mm3    Hemoglobin 13.7 12.0 - 15.9 g/dL    Hematocrit 41.5 34.0 - 46.6 %    MCV 90.8 79.0 - 97.0 fL    MCH 30.0 26.6 - 33.0 pg    MCHC 33.0 31.5 - 35.7 g/dL    RDW 14.5 12.3 - 15.4 %    RDW-SD 46.8 37.0 - 54.0 fl    MPV 9.5 6.0 - 12.0 fL    Platelets 229 140 - 450 10*3/mm3    Neutrophil % 66.1 42.7 - 76.0 %    Lymphocyte % 23.6 19.6 - 45.3 %    Monocyte % 7.7 5.0 - 12.0 %    Eosinophil % 2.0 0.3 - 6.2 %    Basophil % 0.6 0.0 - 1.5 %    Neutrophils, Absolute 6.70 1.70 - 7.00 10*3/mm3    Lymphocytes, Absolute 2.39 0.70 - 3.10 10*3/mm3    Monocytes, Absolute 0.78 0.10 - 0.90 10*3/mm3    Eosinophils, Absolute 0.20 0.00 - 0.40 10*3/mm3    Basophils, Absolute 0.06 0.00 - 0.20 10*3/mm3   Lipid Panel    Specimen:  Blood   Result Value Ref Range    Total Cholesterol 209 (H) 150 - 200 mg/dL    Triglycerides 365 (H) <=150 mg/dL    HDL Cholesterol 38 (L) 40 - 59 mg/dL    LDL Cholesterol  108 (H) <=100 mg/dL    VLDL Cholesterol 63 (H) 5 - 40 mg/dL    LDL/HDL Ratio 2.58 0.00 - 3.22   Results for orders placed or performed in visit on 09/22/21   Vitamin D 25 Hydroxy    Specimen: Blood   Result Value Ref Range    25 Hydroxy, Vitamin D 56.8 30.0 - 100.0 ng/ml   TSH    Specimen: Blood   Result Value Ref Range    TSH 1.980 0.270 - 4.200 uIU/mL   T4, Free    Specimen: Blood   Result Value Ref Range    Free T4 1.13 0.93 - 1.70 ng/dL   Comprehensive Metabolic Panel    Specimen: Blood   Result Value Ref Range    Glucose 120 (H) 70 - 99 mg/dL    BUN 18 7 - 23 mg/dL    Creatinine 1.02 0.52 - 1.04 mg/dL    Sodium 140 137 - 145 mmol/L    Potassium 4.2 3.4 - 5.0 mmol/L    Chloride 107 101 - 112 mmol/L    CO2 24.0 22.0 - 30.0 mmol/L    Calcium 9.4 8.4 - 10.2 mg/dL    Total Protein 7.7 6.3 - 8.6 g/dL    Albumin 4.40 3.50 - 5.00 g/dL    ALT (SGPT) 43 (H) <=35 U/L    AST (SGOT) 55 (H) 14 - 36 U/L    Alkaline Phosphatase 51 38 - 126 U/L    Total Bilirubin 0.5 0.2 - 1.3 mg/dL    eGFR Non African Amer 53 39 - 90 mL/min/1.73    Globulin 3.3 2.3 - 3.5 gm/dL    A/G Ratio 1.3 1.1 - 1.8 g/dL    BUN/Creatinine Ratio 17.6 7.0 - 25.0    Anion Gap 9.0 5.0 - 15.0 mmol/L   Results for orders placed or performed in visit on 06/09/21   Urinalysis With Culture If Indicated - Urine, Clean Catch    Specimen: Urine, Clean Catch   Result Value Ref Range    Color, UA Yellow Yellow, Straw    Appearance, UA Cloudy (A) Clear    pH, UA 5.5 5.5 - 8.0    Specific Gravity, UA >=1.030 1.005 - 1.030    Glucose, UA Negative Negative    Ketones, UA Negative Negative    Bilirubin, UA Negative Negative    Blood, UA Negative Negative    Protein, UA Negative Negative    Leuk Esterase, UA Negative Negative    Nitrite, UA Negative Negative    Urobilinogen, UA 0.2 E.U./dL 0.2 - 1.0  E.U./dL   Results for orders placed or performed in visit on 03/18/21   Uric acid    Specimen: Blood   Result Value Ref Range    Uric Acid 8.7 (H) 2.5 - 6.2 mg/dL   Basic Metabolic Panel    Specimen: Blood   Result Value Ref Range    Glucose 168 (H) 70 - 99 mg/dL    BUN 15 7 - 23 mg/dL    Creatinine 1.07 (H) 0.52 - 1.04 mg/dL    Sodium 141 137 - 145 mmol/L    Potassium 4.3 3.4 - 5.0 mmol/L    Chloride 101 101 - 112 mmol/L    CO2 30.0 22.0 - 30.0 mmol/L    Calcium 9.6 8.4 - 10.2 mg/dL    eGFR Non African Amer 51 39 - 90 mL/min/1.73    BUN/Creatinine Ratio 14.0 7.0 - 25.0    Anion Gap 10.0 5.0 - 15.0 mmol/L   Results for orders placed or performed during the hospital encounter of 07/01/20   Gold Top - SST   Result Value Ref Range    Extra Tube Hold for add-ons.    COVID-19, [facility] IN-HOUSE, NP SWAB IN TRANSPORT MEDIA 8-10 HR TAT - Swab, Nasopharynx    Specimen: Nasopharynx; Swab   Result Value Ref Range    COVID19 Not Detected Not Detected - Ref. Range   STONE ANALYSIS - Calculus, Ureter, Left    Specimen: Ureter, Left; Calculus   Result Value Ref Range    Stone Source Comment     Color Tan     Size 2x1 mm    Stone Weight 1 mg    Composition Comment     Carbonate Apatite 70 %    Ca Hydrogen Phos. 30 %    Photo Comment     Comments: Comment     Please note Comment     Disclaimer: Comment    Urinalysis, Microscopic Only - Urine, Clean Catch    Specimen: Urine, Clean Catch   Result Value Ref Range    RBC, UA None Seen None Seen /HPF    WBC, UA 6-12 (A) None Seen, 0-2, 3-5 /HPF    Bacteria, UA 2+ (A) None Seen /HPF    Squamous Epithelial Cells, UA 3-5 (A) None Seen, 0-2 /HPF    Hyaline Casts, UA 31-50 None Seen /LPF    Methodology Manual Light Microscopy    Urinalysis With Culture If Indicated - Urine, Clean Catch    Specimen: Urine, Clean Catch   Result Value Ref Range    Color, UA Dark Yellow Yellow, Straw, Dark Yellow, Sonrda    Appearance, UA Cloudy (A) Clear    pH, UA <=5.0 5.0 - 9.0    Specific Gravity, UA 1.039 (H)  1.003 - 1.030    Glucose, UA Negative Negative    Ketones, UA Trace (A) Negative    Bilirubin, UA Moderate (2+) (A) Negative    Blood, UA Negative Negative    Protein, UA 30 mg/dL (1+) (A) Negative    Leuk Esterase, UA Trace (A) Negative    Nitrite, UA Negative Negative    Urobilinogen, UA 1.0 E.U./dL 0.2 - 1.0 E.U./dL     *Note: Due to a large number of results and/or encounters for the requested time period, some results have not been displayed. A complete set of results can be found in Results Review.

## 2022-03-31 DIAGNOSIS — M54.42 CHRONIC BILATERAL LOW BACK PAIN WITH BILATERAL SCIATICA: ICD-10-CM

## 2022-03-31 DIAGNOSIS — M54.41 CHRONIC BILATERAL LOW BACK PAIN WITH BILATERAL SCIATICA: ICD-10-CM

## 2022-03-31 DIAGNOSIS — G89.29 CHRONIC BILATERAL LOW BACK PAIN WITH BILATERAL SCIATICA: ICD-10-CM

## 2022-03-31 RX ORDER — HYDROCODONE BITARTRATE AND ACETAMINOPHEN 7.5; 325 MG/1; MG/1
1 TABLET ORAL EVERY 6 HOURS PRN
Qty: 120 TABLET | Refills: 0 | Status: SHIPPED | OUTPATIENT
Start: 2022-03-31 | End: 2022-05-02 | Stop reason: SDUPTHER

## 2022-04-14 ENCOUNTER — OFFICE VISIT (OUTPATIENT)
Dept: ORTHOPEDIC SURGERY | Facility: CLINIC | Age: 72
End: 2022-04-14

## 2022-04-14 VITALS — HEIGHT: 67 IN | BODY MASS INDEX: 34.84 KG/M2 | WEIGHT: 222 LBS

## 2022-04-14 DIAGNOSIS — M25.552 BILATERAL HIP PAIN: ICD-10-CM

## 2022-04-14 DIAGNOSIS — M16.12 PRIMARY OSTEOARTHRITIS OF LEFT HIP: Primary | ICD-10-CM

## 2022-04-14 DIAGNOSIS — M25.551 BILATERAL HIP PAIN: ICD-10-CM

## 2022-04-14 DIAGNOSIS — I10 ESSENTIAL HYPERTENSION: ICD-10-CM

## 2022-04-14 PROCEDURE — 99213 OFFICE O/P EST LOW 20 MIN: CPT | Performed by: ORTHOPAEDIC SURGERY

## 2022-04-14 NOTE — PROGRESS NOTES
"Marielle Hansen is a 71 y.o. female returns for     Chief Complaint   Patient presents with   • Left Hip - Follow-up, Pain   • Right Hip - Follow-up, Pain       HISTORY OF PRESENT ILLNESS:  Patient in for follow-up on bilateral hip pain  Patient states, she is having a numbness in her left hip and is having to pick her left leg up with her hands to cross it over the right leg when she is sitting with her legs out.  A dull ache but occasional sharp pains.     CONCURRENT MEDICAL HISTORY:    The following portions of the patient's history were reviewed and updated as appropriate: allergies, current medications, past family history, past medical history, past social history, past surgical history and problem list.     ROS  No fevers or chills.  No chest pain or shortness of air.  No GI or  disturbances.    PHYSICAL EXAMINATION:       Ht 170.2 cm (67\")   Wt 101 kg (222 lb)   BMI 34.77 kg/m²     Physical Exam  Constitutional:       General: She is not in acute distress.     Appearance: Normal appearance.   Pulmonary:      Effort: Pulmonary effort is normal. No respiratory distress.   Neurological:      Mental Status: She is alert and oriented to person, place, and time.         GAIT:     []  Normal  [x]  Antalgic    Assistive device: [x]  None  []  Walker     []  Crutches  []  Cane     []  Wheelchair  []  Stretcher    Left Hip Exam     Tenderness   Left hip tenderness location: diffuse.    Range of Motion   Flexion: 110   External rotation: 40   Internal rotation: 10     Muscle Strength   Flexion: 4/5     Tests   JULIUS: positive  Fadir:  Positive FADIR test    Other   Erythema: absent  Sensation: normal  Pulse: present    Comments:  + logroll test  + stinchfield test                XR Hips Bilateral With or Without Pelvis 3-4 View  Order date: 1/11/2022  Authorizing: Johnny Rutherford MD  Ordered by Johnny Rutherford MD on 1/11/2022.       Narrative & Impression    Ordering Provider:  Johnny Rutherford " MD Jing  Ordering Diagnosis/Indication:  Bilateral hip pain     Procedure:  XR HIPS BILATERAL W OR WO PELVIS 3-4 VIEW  Exam Date:  1/11/22     COMPARISON:  Not applicable, no relevant images available.     IMPRESSION: AP standing of the pelvis with AP and lateral of both hips show acceptable position and alignment with no evidence of acute bony abnormality in either hip.  Both hips show moderate arthritic changes with loss of sphericity of the femoral head as well as osteophytes on the superior and inferior margins of the femoral head.  No fracture or dislocation is noted.  Mild arthritic changes noted in the visible portions of the lumbar spine.     Johnny Rutherford MD  1/11/22           ASSESSMENT:    Diagnoses and all orders for this visit:    Primary osteoarthritis of left hip  -     CT Guided Contrast Injection Joint; Future    Bilateral hip pain  -     CT Guided Contrast Injection Joint; Future    Essential hypertension          PLAN    She does have significant arthritis in the left hip.  She is not ready for surgical intervention at this time.  We discussed a trial of a steroid injection into the left hip joint to help with symptomatology and to help with progression of motion and activity.  She will continue with general strengthening conditioning.  Follow-up in 6 to 8 weeks to assess her improvement with the injection and to help determine further treatment options.  We began the discussion for eventual total hip arthroplasty.    Return in about 6 weeks (around 5/26/2022) for recheck.    Johnny Rutherford MD

## 2022-04-18 ENCOUNTER — TELEMEDICINE (OUTPATIENT)
Dept: FAMILY MEDICINE CLINIC | Facility: CLINIC | Age: 72
End: 2022-04-18

## 2022-04-18 DIAGNOSIS — I10 ESSENTIAL HYPERTENSION: ICD-10-CM

## 2022-04-18 DIAGNOSIS — E55.9 VITAMIN D DEFICIENCY: ICD-10-CM

## 2022-04-18 DIAGNOSIS — F32.1 CURRENT MODERATE EPISODE OF MAJOR DEPRESSIVE DISORDER WITHOUT PRIOR EPISODE: ICD-10-CM

## 2022-04-18 DIAGNOSIS — M25.552 LEFT HIP PAIN: ICD-10-CM

## 2022-04-18 DIAGNOSIS — M54.42 CHRONIC BILATERAL LOW BACK PAIN WITH BILATERAL SCIATICA: Primary | ICD-10-CM

## 2022-04-18 DIAGNOSIS — M54.41 CHRONIC BILATERAL LOW BACK PAIN WITH BILATERAL SCIATICA: Primary | ICD-10-CM

## 2022-04-18 DIAGNOSIS — G89.29 CHRONIC BILATERAL LOW BACK PAIN WITH BILATERAL SCIATICA: Primary | ICD-10-CM

## 2022-04-18 PROCEDURE — 99214 OFFICE O/P EST MOD 30 MIN: CPT | Performed by: FAMILY MEDICINE

## 2022-04-18 RX ORDER — CHOLECALCIFEROL (VITAMIN D3) 1250 MCG
50000 CAPSULE ORAL 2 TIMES WEEKLY
Qty: 8 CAPSULE | Refills: 5 | Status: SHIPPED | OUTPATIENT
Start: 2022-04-18 | End: 2022-07-20 | Stop reason: SDUPTHER

## 2022-04-18 NOTE — PROGRESS NOTES
Subjective    Seen today by video visit due to the Covid-19 quarantine.   You have chosen to receive care through a telehealth visit.  Do you consent to use a video/audio connection for your medical care today? Yes    Marielle Hansen is a 69 y.o. female with hypertension hypothyroidism, depression, vitamin D deficiency, osteoarthritis, following up today and needing refills.  She is on hydrocodone and is very compliant with the regimen taking it only when needed for the low back and hip and other arthritis pain.    She needs a refill of her vitamin D.    She is seeing Dr. Rutherford for her hip pain.  He is planning on a hip joint injection in the near future and she said that if this does not work she is told she may need the hip joint replacement.    She is having sinus pressure and congestion, nasal drainage, and rhinorrhea for weeks.  She has not had a fever.      She is doing better as far as her anxiety and depression, not crying as much.    History of Present Illness   The following portions of the patient's history were reviewed and updated as appropriate: allergies, current medications, past family history, past medical history, past social history, past surgical history and problem list.  Arthritis   Presents for follow-up visit. Complains of pain, stiffness and joint swelling, reports no joint warmth. The symptoms have been worsening. Affected location: diffuse. Pain is at a severity of 8/10. Associated symptoms include fatigue, pain at night and pain while resting. Pertinent negatives include no diarrhea, dry eyes, dry mouth, dysuria, fever, rash, Raynaud's syndrome, uveitis or weight loss. Compliance with total regimen is %. Compliance with medications is %.     Review of Systems   Respiratory: Negative.    Cardiovascular: Negative.    Genitourinary: Negative for hematuria and vaginal bleeding.   Musculoskeletal: Positive for gait problem.   Skin: Negative.    Allergic/Immunologic: Negative  for immunocompromised state.   Neurological: Negative for dizziness, tremors, seizures, syncope and light-headedness.   Hematological: Negative.    Psychiatric/Behavioral: Positive for sleep disturbance. Negative for agitation and confusion. The patient is nervous/anxious.    All other systems reviewed and are negative.    Objective    Vitals:    04/18/22 1010   PainSc: 0-No pain   There is no height or weight on file to calculate BMI.    Physical Exam   Constitutional: She is oriented to person, place, and time. She appears well-developed. No distress.   HENT:   Head: Normocephalic and atraumatic.   Eyes: Pupils are equal, round, and reactive to light. Right eye exhibits no discharge. Left eye exhibits no discharge.   Pulmonary/Chest: Effort normal.   Neurological: She is alert and oriented to person, place, and time.   Psychiatric: Her behavior is normal. Judgment and thought content normal.       Assessment/Plan   Diagnoses and all orders for this visit:    1. Chronic bilateral low back pain with bilateral sciatica (Primary)    2. Essential hypertension    3. Current moderate episode of major depressive disorder without prior episode (HCC)    4. Left hip pain    5. Vitamin D deficiency  -     Cholecalciferol (Vitamin D3) 1.25 MG (30026 UT) capsule; Take 1 capsule by mouth 2 (Two) Times a Week.  Dispense: 8 capsule; Refill: 5     Continue current medications for hypertension and continue to monitor blood pressures regularly with goal of 130/80 or less    Continue current therapy for depression, anxiety.    Continue vitamin D supplements.    She will follow-up with Dr. Rutherford in orthopedics regarding hip issues as above.    Continue with hydrocodone for the low back pain let me know when she needs a refill.    The patient has read and signed the [facility] Controlled Substance Contract.  I will continue to see patient for regular follow up appointments. Patient is well controlled on the medication.  MARY has  been reviewed by me and is updated every 3 months. The patient is aware of the potential for addiction and dependence.           This document has been electronically signed by Yoselin Rubio MD on April 18, 2022 18:33 CDT

## 2022-04-20 RX ORDER — CEFDINIR 300 MG/1
300 CAPSULE ORAL 2 TIMES DAILY
Qty: 20 CAPSULE | Refills: 0 | Status: SHIPPED | OUTPATIENT
Start: 2022-04-20 | End: 2022-07-21

## 2022-05-02 DIAGNOSIS — G89.29 CHRONIC BILATERAL LOW BACK PAIN WITH BILATERAL SCIATICA: ICD-10-CM

## 2022-05-02 DIAGNOSIS — M54.42 CHRONIC BILATERAL LOW BACK PAIN WITH BILATERAL SCIATICA: ICD-10-CM

## 2022-05-02 DIAGNOSIS — M54.41 CHRONIC BILATERAL LOW BACK PAIN WITH BILATERAL SCIATICA: ICD-10-CM

## 2022-05-02 RX ORDER — HYDROCODONE BITARTRATE AND ACETAMINOPHEN 7.5; 325 MG/1; MG/1
1 TABLET ORAL EVERY 6 HOURS PRN
Qty: 120 TABLET | Refills: 0 | Status: SHIPPED | OUTPATIENT
Start: 2022-05-02 | End: 2022-05-31 | Stop reason: SDUPTHER

## 2022-05-06 ENCOUNTER — HOSPITAL ENCOUNTER (OUTPATIENT)
Dept: INTERVENTIONAL RADIOLOGY/VASCULAR | Facility: HOSPITAL | Age: 72
Discharge: HOME | End: 2022-05-06
Admitting: RADIOLOGY

## 2022-05-06 VITALS
DIASTOLIC BLOOD PRESSURE: 62 MMHG | HEART RATE: 61 BPM | OXYGEN SATURATION: 95 % | RESPIRATION RATE: 18 BRPM | SYSTOLIC BLOOD PRESSURE: 150 MMHG

## 2022-05-06 DIAGNOSIS — M16.12 PRIMARY OSTEOARTHRITIS OF LEFT HIP: ICD-10-CM

## 2022-05-06 DIAGNOSIS — M25.551 BILATERAL HIP PAIN: ICD-10-CM

## 2022-05-06 DIAGNOSIS — M25.552 BILATERAL HIP PAIN: ICD-10-CM

## 2022-05-06 PROCEDURE — 25010000002 IOPAMIDOL 61 % SOLUTION

## 2022-05-06 PROCEDURE — 77002 NEEDLE LOCALIZATION BY XRAY: CPT

## 2022-05-06 PROCEDURE — 25010000002 TRIAMCINOLONE PER 10 MG: Performed by: RADIOLOGY

## 2022-05-06 PROCEDURE — 0 LIDOCAINE 1 % SOLUTION: Performed by: RADIOLOGY

## 2022-05-06 RX ORDER — LIDOCAINE HYDROCHLORIDE 10 MG/ML
INJECTION, SOLUTION INFILTRATION; PERINEURAL
Status: COMPLETED | OUTPATIENT
Start: 2022-05-06 | End: 2022-05-06

## 2022-05-06 RX ORDER — BUPIVACAINE HYDROCHLORIDE 5 MG/ML
INJECTION, SOLUTION EPIDURAL; INTRACAUDAL
Status: COMPLETED | OUTPATIENT
Start: 2022-05-06 | End: 2022-05-06

## 2022-05-06 RX ORDER — TRIAMCINOLONE ACETONIDE 40 MG/ML
INJECTION, SUSPENSION INTRA-ARTICULAR; INTRAMUSCULAR
Status: COMPLETED | OUTPATIENT
Start: 2022-05-06 | End: 2022-05-06

## 2022-05-06 RX ORDER — TRIAMCINOLONE ACETONIDE 40 MG/ML
INJECTION, SUSPENSION INTRA-ARTICULAR; INTRAMUSCULAR
Status: DISPENSED
Start: 2022-05-06 | End: 2022-05-06

## 2022-05-06 RX ADMIN — LIDOCAINE HYDROCHLORIDE 10 ML: 10 INJECTION, SOLUTION INFILTRATION; PERINEURAL at 08:53

## 2022-05-06 RX ADMIN — BUPIVACAINE HYDROCHLORIDE 5 ML: 5 INJECTION, SOLUTION EPIDURAL; INTRACAUDAL; PERINEURAL at 08:57

## 2022-05-06 RX ADMIN — IOPAMIDOL 5 ML: 612 INJECTION, SOLUTION INTRAVENOUS at 08:56

## 2022-05-06 RX ADMIN — TRIAMCINOLONE ACETONIDE 80 MG: 40 INJECTION, SUSPENSION INTRA-ARTICULAR; INTRAMUSCULAR at 08:57

## 2022-05-06 NOTE — POST-PROCEDURE NOTE
INTERVENTIONAL RADIOLOGY: BRIEF OP NOTE    Pre-Procedure Diagnosis: left hip pain & OA  Post-Procedure Diagnosis: same  Procedure: Fluoroscopically Guided Left Hip Injection  Anesthesia: local  Staff: Cheyanne Guallpa M.D.  EBL: none  Specimens: none  Drains: none  Findings: severe OA left hip. Left femoroacetabular adhesive capsulitis. 80mg kenalog/5mL bupivicaine injected.  Complications: none    Patient states immediately post-procedure she has no left hip pain.    Cheyanne Guallpa M.D.  Vascular, Interventional and Wound Physician  IR Chief, [facility]  Cell: [phone] / Office: [phone]

## 2022-05-06 NOTE — PRE-PROCEDURE NOTE
INTERVENTIONAL RADIOLOGY    70yo F PMH diverticulitis, GERD, HH, HLD, HTN, TIA seeing Dr. Rutherford for bilateral hip pain, with significant arthritis in left hip for which she would like a trial of conservative management prior to considering surgical intervention.    On exam, obese, alert, no distress. VSS    The patient's history and physical exam were reviewed, and no changes were noted. The risks, benefits, alternatives, and indications of the procedure were discussed with the patient, and all questions were answered. Informed consent was obtained.    Thank you very much for the referral. Please do not hesitate to contact me if I may be of further assistance.    Cheyanne Guallpa M.D.  Vascular, Interventional and Wound Physician  IR Chief, [facility]  Cell: [phone] / Office: [phone]

## 2022-05-31 DIAGNOSIS — G89.29 CHRONIC BILATERAL LOW BACK PAIN WITH BILATERAL SCIATICA: ICD-10-CM

## 2022-05-31 DIAGNOSIS — M54.42 CHRONIC BILATERAL LOW BACK PAIN WITH BILATERAL SCIATICA: ICD-10-CM

## 2022-05-31 DIAGNOSIS — M54.41 CHRONIC BILATERAL LOW BACK PAIN WITH BILATERAL SCIATICA: ICD-10-CM

## 2022-05-31 RX ORDER — HYDROCODONE BITARTRATE AND ACETAMINOPHEN 7.5; 325 MG/1; MG/1
1 TABLET ORAL EVERY 6 HOURS PRN
Qty: 120 TABLET | Refills: 0 | Status: SHIPPED | OUTPATIENT
Start: 2022-05-31 | End: 2022-06-30 | Stop reason: SDUPTHER

## 2022-05-31 RX ORDER — ESCITALOPRAM OXALATE 10 MG/1
10 TABLET ORAL DAILY
Qty: 30 TABLET | Refills: 5
Start: 2022-05-31 | End: 2022-06-03

## 2022-06-03 RX ORDER — ESCITALOPRAM OXALATE 10 MG/1
TABLET ORAL
Qty: 90 TABLET | Refills: 0 | Status: SHIPPED | OUTPATIENT
Start: 2022-06-03 | End: 2022-07-20 | Stop reason: SDUPTHER

## 2022-06-03 RX ORDER — OLMESARTAN MEDOXOMIL AND HYDROCHLOROTHIAZIDE 40/12.5 40; 12.5 MG/1; MG/1
TABLET ORAL
Qty: 90 TABLET | Refills: 0 | Status: SHIPPED | OUTPATIENT
Start: 2022-06-03 | End: 2022-07-20 | Stop reason: SDUPTHER

## 2022-06-09 ENCOUNTER — OFFICE VISIT (OUTPATIENT)
Dept: ORTHOPEDIC SURGERY | Facility: CLINIC | Age: 72
End: 2022-06-09

## 2022-06-09 VITALS — HEIGHT: 67 IN | WEIGHT: 220 LBS | BODY MASS INDEX: 34.53 KG/M2

## 2022-06-09 DIAGNOSIS — M16.11 PRIMARY OSTEOARTHRITIS OF RIGHT HIP: ICD-10-CM

## 2022-06-09 DIAGNOSIS — I10 ESSENTIAL HYPERTENSION: ICD-10-CM

## 2022-06-09 DIAGNOSIS — M48.061 SPINAL STENOSIS OF LUMBAR REGION WITHOUT NEUROGENIC CLAUDICATION: ICD-10-CM

## 2022-06-09 DIAGNOSIS — M16.12 PRIMARY OSTEOARTHRITIS OF LEFT HIP: Primary | ICD-10-CM

## 2022-06-09 DIAGNOSIS — J45.20 MILD INTERMITTENT ASTHMA WITHOUT COMPLICATION: ICD-10-CM

## 2022-06-09 PROCEDURE — 99214 OFFICE O/P EST MOD 30 MIN: CPT | Performed by: ORTHOPAEDIC SURGERY

## 2022-06-09 NOTE — PROGRESS NOTES
Marielle Hansen is a 72 y.o. female returns for     Chief Complaint   Patient presents with   • Left Hip - Follow-up, Pain       HISTORY OF PRESENT ILLNESS:  F/u left hip pain. Patient states injection done on 5/6/2022 has not helped with pain.    She continues to have pain in the left hip and has difficulty bearing weight on the left side.  She has pain with activities of daily living.  Mostly she has dull aches but she has occasional sharp pains as well.  She is unhappy with her quality of life and wishes to discuss definitive treatment options.       CONCURRENT MEDICAL HISTORY:    Past Medical History:   Diagnosis Date   • Arthritis    • Disease of thyroid gland    • Diverticulitis    • GERD (gastroesophageal reflux disease)    • Hiatal hernia    • Hyperlipidemia    • Hypertension    • TIA (transient ischemic attack)        Allergies   Allergen Reactions   • Codeine Hives       Current Outpatient Medications on File Prior to Visit   Medication Sig   • albuterol sulfate  (90 Base) MCG/ACT inhaler Inhale 2 puffs Every 4 (Four) Hours As Needed for Wheezing.   • amLODIPine (NORVASC) 5 MG tablet Take 1 tablet by mouth Daily.   • cefdinir (OMNICEF) 300 MG capsule Take 1 capsule by mouth 2 (Two) Times a Day.   • Cholecalciferol (Vitamin D3) 1.25 MG (68308 UT) capsule Take 1 capsule by mouth 2 (Two) Times a Week.   • colchicine 0.6 MG tablet 2 tablets by mouth this morning, 1 tablet an hour later.  Then 1 tablet BID   • cyanocobalamin 1000 MCG/ML injection Inject 1 mL into the appropriate muscle as directed by prescriber Every 30 (Thirty) Days.   • DRYSOL 20 % external solution Daily.   • escitalopram (LEXAPRO) 10 MG tablet Take 1 tablet by mouth once daily   • famotidine (Pepcid) 40 MG tablet Take 1 tablet by mouth At Night As Needed for Heartburn.   • HYDROcodone-acetaminophen (Norco) 7.5-325 MG per tablet Take 1 tablet by mouth Every 6 (Six) Hours As Needed for Moderate Pain .   • levothyroxine  "(SYNTHROID, LEVOTHROID) 137 MCG tablet Take 1 tablet by mouth Daily.   • lovastatin (MEVACOR) 40 MG tablet Take 1 tablet by mouth Daily.   • metoprolol succinate XL (TOPROL-XL) 100 MG 24 hr tablet Take 1 tablet by mouth Daily.   • olmesartan-hydrochlorothiazide (BENICAR HCT) 40-12.5 MG per tablet Take 1 tablet by mouth once daily   • ondansetron ODT (Zofran ODT) 4 MG disintegrating tablet Place 1 tablet on the tongue Every 8 (Eight) Hours As Needed for Nausea or Vomiting.   • Syringe 22G X 1\" 3 ML misc Use to inject B-12 injection into appropriate muscle   • traZODone (DESYREL) 150 MG tablet Take 1 tablet by mouth Every Night.     No current facility-administered medications on file prior to visit.       Past Surgical History:   Procedure Laterality Date   • APPENDECTOMY     • CHOLECYSTECTOMY     • COLONOSCOPY N/A 1/30/2020    Procedure: COLONOSCOPY;  Surgeon: Ashlyn King MD;  Location: [facility] ENDOSCOPY;  Service: Gastroenterology   • COLONOSCOPY N/A 3/3/2022    Procedure: COLONOSCOPY;  Surgeon: Ashlyn King MD;  Location: [facility] ENDOSCOPY;  Service: Gastroenterology;  Laterality: N/A;   • CYSTOSCOPY, URETEROSCOPY, RETROGRADE PYELOGRAM, STENT INSERTION Left 7/2/2020    Procedure: CYSTOSCOPY LEFT URETEROSCOPY RETROGRADE PYELOGRAM STENT INSERTION;  Surgeon: Johnny Chino MD;  Location: [facility] OR;  Service: Urology;  Laterality: Left;   • ENDOSCOPY N/A 1/30/2020    Procedure: ESOPHAGOGASTRODUODENOSCOPY;  Surgeon: Ashlyn King MD;  Location: [facility] ENDOSCOPY;  Service: Gastroenterology   • ENDOSCOPY N/A 3/3/2022    Procedure: ESOPHAGOGASTRODUODENOSCOPY;  Surgeon: Ashlyn King MD;  Location: [facility] ENDOSCOPY;  Service: Gastroenterology;  Laterality: N/A;   • EXTRACORPOREAL SHOCKWAVE LITHOTRIPSY (ESWL), STENT INSERTION/REMOVAL Left 6/25/2020    Procedure: LEFT EXTRACORPOREAL SHOCKWAVE LITHOTRIPSY;  Surgeon: Johnny Chino MD;  Location: [facility] OR;  Service: Urology;  Laterality: Left;   • " "HYSTERECTOMY     • JOINT REPLACEMENT Bilateral    • TOTAL KNEE ARTHROPLASTY Bilateral        History reviewed. No pertinent family history.    Social History     Socioeconomic History   • Marital status:    Tobacco Use   • Smoking status: Former Smoker   • Smokeless tobacco: Never Used   • Tobacco comment: quit 20 years ago    Vaping Use   • Vaping Use: Never used   Substance and Sexual Activity   • Alcohol use: No   • Drug use: Never   • Sexual activity: Defer            ROS  No fevers or chills.  No chest pain or shortness of air.  No GI or  disturbances.  Other than left hip pain, all other systems reviewed is negative.    PHYSICAL EXAMINATION:       Ht 170.2 cm (67\")   Wt 99.8 kg (220 lb)   BMI 34.46 kg/m²     Physical Exam  Vitals reviewed.   Constitutional:       General: She is not in acute distress.     Appearance: Normal appearance. She is well-developed.   Cardiovascular:      Rate and Rhythm: Normal rate and regular rhythm.      Heart sounds: Normal heart sounds.   Pulmonary:      Effort: Pulmonary effort is normal. No respiratory distress.      Breath sounds: Normal breath sounds.   Abdominal:      General: Bowel sounds are normal.      Palpations: Abdomen is soft.   Neurological:      Mental Status: She is alert and oriented to person, place, and time.   Psychiatric:         Behavior: Behavior normal.         Thought Content: Thought content normal.         Judgment: Judgment normal.         GAIT:     []  Normal  [x]  Antalgic    Assistive device: [x]  None  []  Walker     []  Crutches  []  Cane     []  Wheelchair  []  Stretcher    Left Hip Exam     Tenderness   Left hip tenderness location: diffuse.    Range of Motion   Flexion: 110   External rotation: 40   Internal rotation: 10     Muscle Strength   Flexion: 4/5     Tests   JULIUS: positive  Fadir:  Positive FADIR test    Other   Erythema: absent  Sensation: normal  Pulse: present    Comments:  + logroll test  + [facility] [facility]                XR Hips Bilateral With or Without Pelvis 3-4 View  Order date: 1/11/2022  Authorizing: Johnny Rutherford MD  Ordered by Johnny Rutherford MD on 1/11/2022.       Narrative & Impression    Ordering Provider:  Johnny Rutherford MD  Ordering Diagnosis/Indication:  Bilateral hip pain     Procedure:  XR HIPS BILATERAL W OR WO PELVIS 3-4 VIEW  Exam Date:  1/11/22     COMPARISON:  Not applicable, no relevant images available.     IMPRESSION: AP standing of the pelvis with AP and lateral of both hips show acceptable position and alignment with no evidence of acute bony abnormality in either hip.  Both hips show moderate arthritic changes with loss of sphericity of the femoral head as well as osteophytes on the superior and inferior margins of the femoral head.  No fracture or dislocation is noted.  Mild arthritic changes noted in the visible portions of the lumbar spine.        Johnny Rutherford MD  1/11/22              ASSESSMENT:    Diagnoses and all orders for this visit:    Primary osteoarthritis of left hip  -     Case Request; Standing  -     MRSA Screen, PCR (Inpatient) - Swab, Nares; Future  -     Type and screen; Future  -     Case Request    Spinal stenosis of lumbar region without neurogenic claudication  -     Case Request; Standing  -     MRSA Screen, PCR (Inpatient) - Swab, Nares; Future  -     Type and screen; Future  -     Case Request    Essential hypertension  -     Case Request; Standing  -     MRSA Screen, PCR (Inpatient) - Swab, Nares; Future  -     Type and screen; Future  -     Case Request    Primary osteoarthritis of right hip  -     Case Request; Standing  -     MRSA Screen, PCR (Inpatient) - Swab, Nares; Future  -     Type and screen; Future  -     Case Request    Mild intermittent asthma without complication  -     Case Request; Standing  -     MRSA Screen, PCR (Inpatient) - Swab, Nares; Future  -     Type and screen; Future  -     Case Request    Other  orders  -     Follow Anesthesia Guidelines / Standing Orders; Future  -     Provide instructions to patient regarding NPO status  -     Chlorhexidine Skin Prep - Educate and Review With Patient; Future  -     Provide Patient With ERAS Hydration Instructions  -     Provide Patient With Enhanced Recovery Booklet(s) or Handout  -     Perform A Memory Screening On All Hip/Knee Replacement Patients >Or Equal To 65 Years Or Older  -     Complete A PROMIS And HOOS Or KOOS Survey If Having Hip Or Knee Replacement  -     Provide NPO Instructions to Patient          PLAN    She has osteoarthritis in both hips.  The left hip is worse than the right.  She has tried activity modification as well as general strength and conditioning exercises.  She has tried anti-inflammatory medication  As well as use of assistive devices.  She has pain with activity of daily living and is unhappy with her quality of life.  I encouraged her to use a cane for support with walking.    The patient voiced understanding of the risks, benefits, and alternative forms of treatment that were discussed and the patient consents to proceed with surgery.  All risks, benefits and alternatives were discussed.  Risks include, but not exclusive to anesthetic complications, including death, MI, CVA, infection, bleeding DVT, fracture, residual pain and need for future surgery.    This discussion was held with the patient by Johnny Rutherford MD and all questions were answered.    Plan left total hip arthroplasty.    We discussed overnight stay.    Return in about 8 weeks (around 8/4/2022) for recheck.    Johnny Rutherford MD

## 2022-06-11 RX ORDER — OXYCODONE HCL 10 MG/1
10 TABLET, FILM COATED, EXTENDED RELEASE ORAL ONCE
Status: CANCELLED | OUTPATIENT
Start: 2022-09-26 | End: 2022-06-11

## 2022-06-11 RX ORDER — BUPIVACAINE HCL/0.9 % NACL/PF 0.1 %
2 PLASTIC BAG, INJECTION (ML) EPIDURAL ONCE
Status: CANCELLED | OUTPATIENT
Start: 2022-09-26 | End: 2022-06-11

## 2022-06-11 RX ORDER — ACETAMINOPHEN 500 MG
1000 TABLET ORAL ONCE
Status: CANCELLED | OUTPATIENT
Start: 2022-09-26 | End: 2022-06-11

## 2022-06-11 RX ORDER — PREGABALIN 75 MG/1
75 CAPSULE ORAL ONCE
Status: CANCELLED | OUTPATIENT
Start: 2022-09-26 | End: 2022-06-11

## 2022-06-11 RX ORDER — MELOXICAM 15 MG/1
15 TABLET ORAL ONCE
Status: CANCELLED | OUTPATIENT
Start: 2022-09-26 | End: 2022-06-11

## 2022-06-14 PROBLEM — J45.20 MILD INTERMITTENT ASTHMA WITHOUT COMPLICATION: Status: ACTIVE | Noted: 2022-06-14

## 2022-06-20 ENCOUNTER — TELEPHONE (OUTPATIENT)
Dept: ORTHOPEDIC SURGERY | Facility: CLINIC | Age: 72
End: 2022-06-20

## 2022-06-20 NOTE — TELEPHONE ENCOUNTER
No answer.  Unable to leave message    Pre op same day surg dept  9/1/22   12pm    Dr Rutherford  9/1/22   1:05pm    Joint Class  8/3/22   2pm    Covid  9/23/22  9am

## 2022-06-30 DIAGNOSIS — G89.29 CHRONIC BILATERAL LOW BACK PAIN WITH BILATERAL SCIATICA: ICD-10-CM

## 2022-06-30 DIAGNOSIS — M54.42 CHRONIC BILATERAL LOW BACK PAIN WITH BILATERAL SCIATICA: ICD-10-CM

## 2022-06-30 DIAGNOSIS — M54.41 CHRONIC BILATERAL LOW BACK PAIN WITH BILATERAL SCIATICA: ICD-10-CM

## 2022-06-30 DIAGNOSIS — I10 ESSENTIAL HYPERTENSION: ICD-10-CM

## 2022-06-30 RX ORDER — HYDROCODONE BITARTRATE AND ACETAMINOPHEN 7.5; 325 MG/1; MG/1
1 TABLET ORAL EVERY 6 HOURS PRN
Qty: 120 TABLET | Refills: 0 | Status: SHIPPED | OUTPATIENT
Start: 2022-06-30 | End: 2022-08-02 | Stop reason: SDUPTHER

## 2022-06-30 RX ORDER — AMLODIPINE BESYLATE 5 MG/1
5 TABLET ORAL DAILY
Qty: 90 TABLET | Refills: 2 | Status: SHIPPED | OUTPATIENT
Start: 2022-06-30 | End: 2022-07-20 | Stop reason: SDUPTHER

## 2022-07-18 ENCOUNTER — TELEPHONE (OUTPATIENT)
Dept: FAMILY MEDICINE CLINIC | Facility: CLINIC | Age: 72
End: 2022-07-18

## 2022-07-18 NOTE — TELEPHONE ENCOUNTER
Her daughter, Areli, is wanting to speak with you about her condition when you get a chance. [phone]

## 2022-07-20 DIAGNOSIS — I10 ESSENTIAL HYPERTENSION: ICD-10-CM

## 2022-07-20 DIAGNOSIS — E03.9 HYPOTHYROIDISM, UNSPECIFIED TYPE: ICD-10-CM

## 2022-07-20 DIAGNOSIS — E55.9 VITAMIN D DEFICIENCY: ICD-10-CM

## 2022-07-20 DIAGNOSIS — R11.0 NAUSEA: ICD-10-CM

## 2022-07-20 RX ORDER — AMLODIPINE BESYLATE 5 MG/1
5 TABLET ORAL DAILY
Qty: 90 TABLET | Refills: 2 | Status: SHIPPED | OUTPATIENT
Start: 2022-07-20 | End: 2023-01-16

## 2022-07-20 RX ORDER — METOPROLOL SUCCINATE 100 MG/1
100 TABLET, EXTENDED RELEASE ORAL DAILY
Qty: 90 TABLET | Refills: 2 | Status: SHIPPED | OUTPATIENT
Start: 2022-07-20 | End: 2023-01-16

## 2022-07-20 RX ORDER — ESCITALOPRAM OXALATE 10 MG/1
10 TABLET ORAL DAILY
Qty: 90 TABLET | Refills: 0 | Status: SHIPPED | OUTPATIENT
Start: 2022-07-20 | End: 2022-09-20

## 2022-07-20 RX ORDER — CYANOCOBALAMIN 1000 UG/ML
1000 INJECTION, SOLUTION INTRAMUSCULAR; SUBCUTANEOUS
Qty: 1 ML | Refills: 5 | Status: SHIPPED | OUTPATIENT
Start: 2022-07-20 | End: 2022-11-11

## 2022-07-20 RX ORDER — TRAZODONE HYDROCHLORIDE 150 MG/1
150 TABLET ORAL NIGHTLY
Qty: 30 TABLET | Refills: 5 | Status: SHIPPED | OUTPATIENT
Start: 2022-07-20 | End: 2022-08-09

## 2022-07-20 RX ORDER — LOVASTATIN 40 MG/1
40 TABLET ORAL DAILY
Qty: 90 TABLET | Refills: 2 | Status: SHIPPED | OUTPATIENT
Start: 2022-07-20 | End: 2022-09-20

## 2022-07-20 RX ORDER — LEVOTHYROXINE SODIUM 137 UG/1
137 TABLET ORAL DAILY
Qty: 90 TABLET | Refills: 2 | Status: SHIPPED | OUTPATIENT
Start: 2022-07-20 | End: 2023-01-16

## 2022-07-20 RX ORDER — COLCHICINE 0.6 MG/1
TABLET ORAL
Qty: 60 TABLET | Refills: 5 | Status: SHIPPED | OUTPATIENT
Start: 2022-07-20 | End: 2022-09-20

## 2022-07-20 RX ORDER — OLMESARTAN MEDOXOMIL AND HYDROCHLOROTHIAZIDE 40/12.5 40; 12.5 MG/1; MG/1
1 TABLET ORAL DAILY
Qty: 90 TABLET | Refills: 0 | Status: SHIPPED | OUTPATIENT
Start: 2022-07-20 | End: 2022-10-17

## 2022-07-20 RX ORDER — ONDANSETRON 4 MG/1
4 TABLET, ORALLY DISINTEGRATING ORAL EVERY 8 HOURS PRN
Qty: 30 TABLET | Refills: 5 | Status: SHIPPED | OUTPATIENT
Start: 2022-07-20 | End: 2023-03-29

## 2022-07-20 RX ORDER — CHOLECALCIFEROL (VITAMIN D3) 1250 MCG
50000 CAPSULE ORAL 2 TIMES WEEKLY
Qty: 8 CAPSULE | Refills: 5 | Status: SHIPPED | OUTPATIENT
Start: 2022-07-21 | End: 2022-10-17

## 2022-07-20 RX ORDER — FAMOTIDINE 40 MG/1
40 TABLET, FILM COATED ORAL NIGHTLY PRN
Qty: 30 TABLET | Refills: 5 | Status: SHIPPED | OUTPATIENT
Start: 2022-07-20 | End: 2022-09-20

## 2022-07-21 ENCOUNTER — LAB (OUTPATIENT)
Dept: LAB | Facility: OTHER | Age: 72
End: 2022-07-21

## 2022-07-21 ENCOUNTER — TELEPHONE (OUTPATIENT)
Dept: FAMILY MEDICINE CLINIC | Facility: CLINIC | Age: 72
End: 2022-07-21

## 2022-07-21 DIAGNOSIS — R30.0 DYSURIA: Primary | ICD-10-CM

## 2022-07-21 DIAGNOSIS — R30.0 DYSURIA: ICD-10-CM

## 2022-07-21 LAB
BACTERIA UR QL AUTO: ABNORMAL /HPF
BILIRUB UR QL STRIP: NEGATIVE
CLARITY UR: ABNORMAL
COLOR UR: YELLOW
GLUCOSE UR STRIP-MCNC: NEGATIVE MG/DL
HGB UR QL STRIP.AUTO: NEGATIVE
HYALINE CASTS UR QL AUTO: ABNORMAL /LPF
KETONES UR QL STRIP: NEGATIVE
LEUKOCYTE ESTERASE UR QL STRIP.AUTO: ABNORMAL
NITRITE UR QL STRIP: POSITIVE
PH UR STRIP.AUTO: 5.5 [PH] (ref 5.5–8)
PROT UR QL STRIP: NEGATIVE
RBC # UR STRIP: ABNORMAL /HPF
REF LAB TEST METHOD: ABNORMAL
SP GR UR STRIP: 1.02 (ref 1–1.03)
SQUAMOUS #/AREA URNS HPF: ABNORMAL /HPF
UROBILINOGEN UR QL STRIP: ABNORMAL
WBC # UR STRIP: ABNORMAL /HPF

## 2022-07-21 PROCEDURE — 81001 URINALYSIS AUTO W/SCOPE: CPT | Performed by: FAMILY MEDICINE

## 2022-07-21 PROCEDURE — 87186 SC STD MICRODIL/AGAR DIL: CPT | Performed by: FAMILY MEDICINE

## 2022-07-21 PROCEDURE — 87086 URINE CULTURE/COLONY COUNT: CPT | Performed by: FAMILY MEDICINE

## 2022-07-21 PROCEDURE — 87088 URINE BACTERIA CULTURE: CPT | Performed by: FAMILY MEDICINE

## 2022-07-21 RX ORDER — SULFAMETHOXAZOLE AND TRIMETHOPRIM 800; 160 MG/1; MG/1
1 TABLET ORAL 2 TIMES DAILY
Qty: 20 TABLET | Refills: 0 | Status: SHIPPED | OUTPATIENT
Start: 2022-07-21 | End: 2022-08-01

## 2022-07-23 LAB — BACTERIA SPEC AEROBE CULT: ABNORMAL

## 2022-07-25 ENCOUNTER — PRIOR AUTHORIZATION (OUTPATIENT)
Dept: FAMILY MEDICINE CLINIC | Facility: CLINIC | Age: 72
End: 2022-07-25

## 2022-07-25 NOTE — TELEPHONE ENCOUNTER
HARRY BEAN ([place]son: [id number]) - [phone]  Colchicine 0.6MG tablets     Status: PA Response - Approved  Created: July 21st, 2022

## 2022-08-01 ENCOUNTER — TELEPHONE (OUTPATIENT)
Dept: FAMILY MEDICINE CLINIC | Facility: CLINIC | Age: 72
End: 2022-08-01

## 2022-08-01 RX ORDER — LEVOFLOXACIN 250 MG/1
250 TABLET ORAL DAILY
Qty: 10 TABLET | Refills: 0 | Status: SHIPPED | OUTPATIENT
Start: 2022-08-01 | End: 2022-08-11

## 2022-08-02 ENCOUNTER — TELEPHONE (OUTPATIENT)
Dept: FAMILY MEDICINE CLINIC | Facility: CLINIC | Age: 72
End: 2022-08-02

## 2022-08-02 DIAGNOSIS — G89.29 CHRONIC BILATERAL LOW BACK PAIN WITH BILATERAL SCIATICA: ICD-10-CM

## 2022-08-02 DIAGNOSIS — M54.41 CHRONIC BILATERAL LOW BACK PAIN WITH BILATERAL SCIATICA: ICD-10-CM

## 2022-08-02 DIAGNOSIS — M54.42 CHRONIC BILATERAL LOW BACK PAIN WITH BILATERAL SCIATICA: ICD-10-CM

## 2022-08-02 RX ORDER — HYDROCODONE BITARTRATE AND ACETAMINOPHEN 7.5; 325 MG/1; MG/1
1 TABLET ORAL EVERY 6 HOURS PRN
Qty: 120 TABLET | Refills: 0 | Status: SHIPPED | OUTPATIENT
Start: 2022-08-02 | End: 2022-08-04 | Stop reason: SDUPTHER

## 2022-08-04 DIAGNOSIS — G89.29 CHRONIC BILATERAL LOW BACK PAIN WITH BILATERAL SCIATICA: ICD-10-CM

## 2022-08-04 DIAGNOSIS — M54.41 CHRONIC BILATERAL LOW BACK PAIN WITH BILATERAL SCIATICA: ICD-10-CM

## 2022-08-04 DIAGNOSIS — M54.42 CHRONIC BILATERAL LOW BACK PAIN WITH BILATERAL SCIATICA: ICD-10-CM

## 2022-08-04 RX ORDER — HYDROCODONE BITARTRATE AND ACETAMINOPHEN 7.5; 325 MG/1; MG/1
1 TABLET ORAL EVERY 6 HOURS PRN
Qty: 120 TABLET | Refills: 0 | Status: SHIPPED | OUTPATIENT
Start: 2022-08-04 | End: 2022-09-12 | Stop reason: SDUPTHER

## 2022-08-09 ENCOUNTER — OFFICE VISIT (OUTPATIENT)
Dept: FAMILY MEDICINE CLINIC | Facility: CLINIC | Age: 72
End: 2022-08-09

## 2022-08-09 VITALS
HEIGHT: 67 IN | BODY MASS INDEX: 33.12 KG/M2 | DIASTOLIC BLOOD PRESSURE: 70 MMHG | TEMPERATURE: 98.1 F | HEART RATE: 75 BPM | WEIGHT: 211 LBS | OXYGEN SATURATION: 97 % | SYSTOLIC BLOOD PRESSURE: 132 MMHG

## 2022-08-09 DIAGNOSIS — E66.9 OBESITY (BMI 30.0-34.9): ICD-10-CM

## 2022-08-09 DIAGNOSIS — E53.8 VITAMIN B12 DEFICIENCY: ICD-10-CM

## 2022-08-09 DIAGNOSIS — M54.42 CHRONIC BILATERAL LOW BACK PAIN WITH BILATERAL SCIATICA: ICD-10-CM

## 2022-08-09 DIAGNOSIS — G47.00 INSOMNIA, UNSPECIFIED TYPE: ICD-10-CM

## 2022-08-09 DIAGNOSIS — M25.552 BILATERAL HIP PAIN: Primary | ICD-10-CM

## 2022-08-09 DIAGNOSIS — E55.9 VITAMIN D DEFICIENCY: ICD-10-CM

## 2022-08-09 DIAGNOSIS — I10 ESSENTIAL HYPERTENSION: ICD-10-CM

## 2022-08-09 DIAGNOSIS — M54.41 CHRONIC BILATERAL LOW BACK PAIN WITH BILATERAL SCIATICA: ICD-10-CM

## 2022-08-09 DIAGNOSIS — M25.551 BILATERAL HIP PAIN: Primary | ICD-10-CM

## 2022-08-09 DIAGNOSIS — G89.29 CHRONIC BILATERAL LOW BACK PAIN WITH BILATERAL SCIATICA: ICD-10-CM

## 2022-08-09 DIAGNOSIS — N39.0 URINARY TRACT INFECTION WITHOUT HEMATURIA, SITE UNSPECIFIED: ICD-10-CM

## 2022-08-09 PROCEDURE — 99214 OFFICE O/P EST MOD 30 MIN: CPT | Performed by: FAMILY MEDICINE

## 2022-08-09 RX ORDER — TRAZODONE HYDROCHLORIDE 300 MG/1
300 TABLET ORAL NIGHTLY
Qty: 30 TABLET | Refills: 5 | Status: SHIPPED | OUTPATIENT
Start: 2022-08-09 | End: 2022-09-20

## 2022-08-09 NOTE — PROGRESS NOTES
Subjective   Marielle Hansen is a 69 y.o. female with hypertension hypothyroidism vitamin D deficiency and menopausal symptoms here in the office today for follow up and refills of medications.  She had a recent UTI and had some episodes of confusion while she was sick.  She feels like she is back to baseline.      She has hip replacement scheduled Sept 26th. She needs both replaced but the left one is being done first.     Her sleeping pills are not working.  She is on trazodone 150 which isn't working but she tried a higher dose and it worked.     She is trying to lose weight.  She has a stationary bike and is trying to walk.  She is cutting back on carbs.      She will be due for annual labs next month    History of Present Illness   The following portions of the patient's history were reviewed and updated as appropriate: allergies, current medications, past family history, past medical history, past social history, past surgical history and problem list.  Back Pain   This is a chronic problem. The current episode started more than 1 year ago. The problem occurs constantly. The problem is unchanged. The pain is present in the lumbar spine. The quality of the pain is described as shooting, stabbing and aching. The pain radiates to the right knee and left knee. The pain is at a severity of 8/10. The pain is severe. The pain is the same all the time. The symptoms are aggravated by standing, twisting, position, bending and sitting. Stiffness is present at night, all day and in the morning. Associated symptoms include leg pain and tingling. Pertinent negatives include no abdominal pain, bladder incontinence, bowel incontinence, chest pain, dysuria, fever, headaches, numbness, paresis, paresthesias, pelvic pain, perianal numbness, weakness or weight loss. Risk factors include sedentary lifestyle. Patient has tried analgesics, NSAIDs, muscle relaxant and heat for the symptoms. The treatment provided mild relief.  "    Review of Systems   Respiratory: Negative.    Cardiovascular: Negative.    Genitourinary: Negative for hematuria and vaginal bleeding.   Musculoskeletal: Positive for gait problem.   Skin: Negative.    Allergic/Immunologic: Negative for immunocompromised state.   Neurological: Negative for dizziness, tremors, seizures, syncope and light-headedness.   Hematological: Negative.    Psychiatric/Behavioral: Positive for sleep disturbance. Negative for agitation and confusion. The patient is nervous/anxious.    All other systems reviewed and are negative.      Objective    Vitals:    08/09/22 1257   BP: 132/70   Pulse: 75   Temp: 98.1 °F (36.7 °C)   SpO2: 97%   Weight: 95.7 kg (211 lb)   Height: 170.2 cm (67\")   PainSc: 0-No pain   Body mass index is 33.05 kg/m².    Physical Exam   Constitutional: She is oriented to person, place, and time. She appears well-developed.   Obese.   HENT:   Head: Normocephalic and atraumatic.   Nose: Nose normal.   Eyes: Pupils are equal, round, and reactive to light. Conjunctivae are normal.   Neck: No JVD present. No tracheal deviation present. No thyromegaly present.   Cardiovascular: Normal rate, regular rhythm and normal heart sounds.   No murmur heard.  Pulmonary/Chest: Effort normal and breath sounds normal. She has no wheezes.   Abdominal: Soft. Bowel sounds are normal. She exhibits no distension. There is no abdominal tenderness.   Musculoskeletal: Normal range of motion.      Comments: Patient has very stiff gait, some mild tenderness on the right low back.   Lymphadenopathy:     She has no cervical adenopathy.   Neurological: She is alert and oriented to person, place, and time. Coordination normal.   Skin: Skin is warm and dry. No rash noted.   Nursing note and vitals reviewed.    Study Result    Narrative & Impression   EXAM DESCRIPTION: MRI LUMBAR SPINE WO CONTRAST     CLINICAL HISTORY: 70-year-old female with history of chronic low  back pain now with sudden increase pain. " Pain down both legs.  Patient urinates herself when standing up. Study notes: Patient  states no specific injury or surgery in the region. Right-sided  lumbar pain that radiates into the right hip and leg.     COMPARISON: 11/14/2019     FINDINGS:       Sagittal and axial T1 and T2 MR images of lumbar spine are  submitted for interpretation.      There are small renal cortical cysts redemonstrated. No acute  finding within the included paraspinal soft tissues.     There is anatomic alignment of the lumbar and the included  thoracic vertebra. There is no compression fracture.  Redemonstration of heterogeneous T1 and T2 marrow signal without  focal suspicious mass or STIR signal abnormality.     The conus terminates at the L2 level and demonstrates normal  signal and morphology.     Loss of T2 disc signal at all lumbar levels. There is loss of  disc space height at L1-2 and L2-3.     T12-L1:  No significant disc bulge, protrusion/extrusion, or  stenosis. Facet arthropathy.        L1-L2:  There is mild disc bulge without focal  protrusion/extrusion or stenosis. Facet arthropathy.     L2-L3:  Redemonstration of mild disc bulge and small left central  to subarticular disc protrusion. There is facet arthropathy and  some thickening of the ligamentum flavum. Contour changes of the  thecal sac without significant central spinal canal stenosis.  There is minimal foraminal stenosis inferiorly. No compromise of  exiting nerves.     L3-L4: Minimal disc bulge without protrusion/extrusion. There is  facet arthropathy. No central spinal canal or foraminal stenosis.     L4-L5: There is disc bulge with a new finding of a left central  to subarticular disc protrusion which appears fairly bright on T2  signal suggesting is fairly acute. It contributes to asymmetrical  compression of the left ventral thecal sac to a dimension of 8  mm, mildly stenotic. This causes encroachment upon the left  lateral recess without compression of the  coursing nerve. There  is facet arthropathy and thickening of the ligamentum flavum. No  significant foraminal stenosis.     L5-S1: No significant disc bulge, protrusion/extrusion, or  stenosis. There is severe facet arthropathy.     IMPRESSION:  Multilevel degenerative changes as detailed above.           Electronically signed by:  Judson Lepe MD  5/17/2021 3:57 PM  CDT Workstation: [id number]       Assessment & Plan   Diagnoses and all orders for this visit:    1. Bilateral hip pain (Primary)    2. Essential hypertension  -     Comprehensive Metabolic Panel  -     CBC & Differential; Future  -     Lipid Panel; Future  -     T4, Free  -     TSH    3. Chronic bilateral low back pain with bilateral sciatica    4. Urinary tract infection without hematuria, site unspecified    5. Obesity (BMI 30.0-34.9)    6. Insomnia, unspecified type  -     traZODone (DESYREL) 300 MG tablet; Take 1 tablet by mouth Every Night.  Dispense: 30 tablet; Refill: 5    7. Vitamin D deficiency  -     Vitamin D 25 Hydroxy    8. Vitamin B12 deficiency  -     Vitamin B12 & Folate    The patient has read and signed the [facility] Controlled Substance Contract.  I will continue to see patient for regular follow up appointments. Patient is well controlled on the medication.  MARY has been reviewed by me and is updated every 3 months. The patient is aware of the potential for addiction and dependence.     Will increase the trazodone for insomnia.     Discussed the patient's BMI with her. BMI is above normal parameters. Follow-up plan includes:  educational material and nutrition counseling.    Contact me when [place] refill is needed.  Continue this for back and hip pain issues.  If she needs assistance with perioperative pain management when he she has her hip replaced she can contact me.    Continue current medications for hypertension and continue to monitor blood pressures regularly with goal of 130/80 or less    Contact me for any recurring  UTI symptoms.     Labs are ordered for next month.     She is due for colonoscopy in September, has already been in contact with GI.        This document has been electronically signed by Yoselin Rubio MD on August 9, 2022 13:12 CDT

## 2022-09-12 ENCOUNTER — LAB (OUTPATIENT)
Dept: LAB | Facility: OTHER | Age: 72
End: 2022-09-12

## 2022-09-12 DIAGNOSIS — M54.42 CHRONIC BILATERAL LOW BACK PAIN WITH BILATERAL SCIATICA: ICD-10-CM

## 2022-09-12 DIAGNOSIS — G89.29 CHRONIC BILATERAL LOW BACK PAIN WITH BILATERAL SCIATICA: ICD-10-CM

## 2022-09-12 DIAGNOSIS — M54.41 CHRONIC BILATERAL LOW BACK PAIN WITH BILATERAL SCIATICA: ICD-10-CM

## 2022-09-12 DIAGNOSIS — I10 ESSENTIAL HYPERTENSION: ICD-10-CM

## 2022-09-12 DIAGNOSIS — Z51.81 MEDICATION MONITORING ENCOUNTER: Primary | ICD-10-CM

## 2022-09-12 LAB
ALBUMIN SERPL-MCNC: 4.4 G/DL (ref 3.5–5)
ALBUMIN/GLOB SERPL: 1.4 G/DL (ref 1.1–1.8)
ALP SERPL-CCNC: 48 U/L (ref 38–126)
ALT SERPL W P-5'-P-CCNC: 17 U/L
ANION GAP SERPL CALCULATED.3IONS-SCNC: 9 MMOL/L (ref 5–15)
AST SERPL-CCNC: 21 U/L (ref 14–36)
BASOPHILS # BLD AUTO: 0.07 10*3/MM3 (ref 0–0.2)
BASOPHILS NFR BLD AUTO: 0.7 % (ref 0–1.5)
BILIRUB SERPL-MCNC: 0.4 MG/DL (ref 0.2–1.3)
BUN SERPL-MCNC: 21 MG/DL (ref 7–23)
BUN/CREAT SERPL: 16.4 (ref 7–25)
CALCIUM SPEC-SCNC: 9.4 MG/DL (ref 8.4–10.2)
CHLORIDE SERPL-SCNC: 102 MMOL/L (ref 101–112)
CHOLEST SERPL-MCNC: 141 MG/DL (ref 150–200)
CO2 SERPL-SCNC: 27 MMOL/L (ref 22–30)
CREAT SERPL-MCNC: 1.28 MG/DL (ref 0.52–1.04)
DEPRECATED RDW RBC AUTO: 44.7 FL (ref 37–54)
EGFRCR SERPLBLD CKD-EPI 2021: 44.6 ML/MIN/1.73
EOSINOPHIL # BLD AUTO: 0.18 10*3/MM3 (ref 0–0.4)
EOSINOPHIL NFR BLD AUTO: 1.8 % (ref 0.3–6.2)
ERYTHROCYTE [DISTWIDTH] IN BLOOD BY AUTOMATED COUNT: 13.8 % (ref 12.3–15.4)
GLOBULIN UR ELPH-MCNC: 3.1 GM/DL (ref 2.3–3.5)
GLUCOSE SERPL-MCNC: 135 MG/DL (ref 70–99)
HCT VFR BLD AUTO: 39.7 % (ref 34–46.6)
HDLC SERPL-MCNC: 30 MG/DL (ref 40–59)
HGB BLD-MCNC: 12.8 G/DL (ref 12–15.9)
LDLC SERPL CALC-MCNC: 66 MG/DL
LDLC/HDLC SERPL: 1.85 {RATIO} (ref 0–3.22)
LYMPHOCYTES # BLD AUTO: 2.58 10*3/MM3 (ref 0.7–3.1)
LYMPHOCYTES NFR BLD AUTO: 25.4 % (ref 19.6–45.3)
MCH RBC QN AUTO: 29.3 PG (ref 26.6–33)
MCHC RBC AUTO-ENTMCNC: 32.2 G/DL (ref 31.5–35.7)
MCV RBC AUTO: 90.8 FL (ref 79–97)
MONOCYTES # BLD AUTO: 0.67 10*3/MM3 (ref 0.1–0.9)
MONOCYTES NFR BLD AUTO: 6.6 % (ref 5–12)
NEUTROPHILS NFR BLD AUTO: 6.66 10*3/MM3 (ref 1.7–7)
NEUTROPHILS NFR BLD AUTO: 65.5 % (ref 42.7–76)
PLATELET # BLD AUTO: 224 10*3/MM3 (ref 140–450)
PMV BLD AUTO: 9.5 FL (ref 6–12)
POTASSIUM SERPL-SCNC: 4.4 MMOL/L (ref 3.4–5)
PROT SERPL-MCNC: 7.5 G/DL (ref 6.3–8.6)
RBC # BLD AUTO: 4.37 10*6/MM3 (ref 3.77–5.28)
SODIUM SERPL-SCNC: 138 MMOL/L (ref 137–145)
TRIGL SERPL-MCNC: 278 MG/DL
VLDLC SERPL-MCNC: 45 MG/DL (ref 5–40)
WBC NRBC COR # BLD: 10.16 10*3/MM3 (ref 3.4–10.8)

## 2022-09-12 PROCEDURE — 80061 LIPID PANEL: CPT | Performed by: FAMILY MEDICINE

## 2022-09-12 PROCEDURE — 80053 COMPREHEN METABOLIC PANEL: CPT | Performed by: FAMILY MEDICINE

## 2022-09-12 PROCEDURE — 85025 COMPLETE CBC W/AUTO DIFF WBC: CPT | Performed by: FAMILY MEDICINE

## 2022-09-12 PROCEDURE — 84439 ASSAY OF FREE THYROXINE: CPT | Performed by: FAMILY MEDICINE

## 2022-09-12 PROCEDURE — 84443 ASSAY THYROID STIM HORMONE: CPT | Performed by: FAMILY MEDICINE

## 2022-09-12 PROCEDURE — 82306 VITAMIN D 25 HYDROXY: CPT | Performed by: FAMILY MEDICINE

## 2022-09-12 PROCEDURE — 82607 VITAMIN B-12: CPT | Performed by: FAMILY MEDICINE

## 2022-09-12 PROCEDURE — 36415 COLL VENOUS BLD VENIPUNCTURE: CPT | Performed by: FAMILY MEDICINE

## 2022-09-12 PROCEDURE — 82746 ASSAY OF FOLIC ACID SERUM: CPT | Performed by: FAMILY MEDICINE

## 2022-09-12 RX ORDER — HYDROCODONE BITARTRATE AND ACETAMINOPHEN 7.5; 325 MG/1; MG/1
1 TABLET ORAL EVERY 6 HOURS PRN
Qty: 120 TABLET | Refills: 0 | Status: SHIPPED | OUTPATIENT
Start: 2022-09-12 | End: 2022-09-29 | Stop reason: HOSPADM

## 2022-09-13 ENCOUNTER — TELEPHONE (OUTPATIENT)
Dept: FAMILY MEDICINE CLINIC | Facility: CLINIC | Age: 72
End: 2022-09-13

## 2022-09-13 LAB
25(OH)D3 SERPL-MCNC: 146 NG/ML (ref 30–100)
FOLATE SERPL-MCNC: 14.9 NG/ML (ref 4.78–24.2)
T4 FREE SERPL-MCNC: 1.19 NG/DL (ref 0.93–1.7)
TSH SERPL DL<=0.05 MIU/L-ACNC: 1.01 UIU/ML (ref 0.27–4.2)
VIT B12 BLD-MCNC: 1007 PG/ML (ref 211–946)

## 2022-09-13 RX ORDER — SULFAMETHOXAZOLE AND TRIMETHOPRIM 800; 160 MG/1; MG/1
1 TABLET ORAL 2 TIMES DAILY
Qty: 20 TABLET | Refills: 0 | Status: SHIPPED | OUTPATIENT
Start: 2022-09-13 | End: 2022-09-14 | Stop reason: SDUPTHER

## 2022-09-14 ENCOUNTER — TELEPHONE (OUTPATIENT)
Dept: FAMILY MEDICINE CLINIC | Facility: CLINIC | Age: 72
End: 2022-09-14

## 2022-09-14 RX ORDER — SULFAMETHOXAZOLE AND TRIMETHOPRIM 800; 160 MG/1; MG/1
1 TABLET ORAL 2 TIMES DAILY
Qty: 20 TABLET | Refills: 0 | Status: ON HOLD | OUTPATIENT
Start: 2022-09-14 | End: 2022-09-26

## 2022-09-20 ENCOUNTER — PRE-ADMISSION TESTING (OUTPATIENT)
Dept: PREADMISSION TESTING | Facility: HOSPITAL | Age: 72
End: 2022-09-20

## 2022-09-20 ENCOUNTER — OFFICE VISIT (OUTPATIENT)
Dept: ORTHOPEDIC SURGERY | Facility: CLINIC | Age: 72
End: 2022-09-20

## 2022-09-20 VITALS — BODY MASS INDEX: 32.96 KG/M2 | HEIGHT: 67 IN | WEIGHT: 210 LBS

## 2022-09-20 VITALS
SYSTOLIC BLOOD PRESSURE: 124 MMHG | OXYGEN SATURATION: 94 % | DIASTOLIC BLOOD PRESSURE: 78 MMHG | RESPIRATION RATE: 18 BRPM | HEART RATE: 65 BPM

## 2022-09-20 DIAGNOSIS — I10 ESSENTIAL HYPERTENSION: ICD-10-CM

## 2022-09-20 DIAGNOSIS — M16.12 PRIMARY OSTEOARTHRITIS OF LEFT HIP: Primary | ICD-10-CM

## 2022-09-20 DIAGNOSIS — J45.20 MILD INTERMITTENT ASTHMA WITHOUT COMPLICATION: ICD-10-CM

## 2022-09-20 DIAGNOSIS — M48.061 SPINAL STENOSIS OF LUMBAR REGION WITHOUT NEUROGENIC CLAUDICATION: ICD-10-CM

## 2022-09-20 DIAGNOSIS — M16.11 PRIMARY OSTEOARTHRITIS OF RIGHT HIP: ICD-10-CM

## 2022-09-20 DIAGNOSIS — M16.12 PRIMARY OSTEOARTHRITIS OF LEFT HIP: ICD-10-CM

## 2022-09-20 LAB
ABO GROUP BLD: NORMAL
BLD GP AB SCN SERPL QL: NEGATIVE
Lab: NORMAL
MRSA DNA SPEC QL NAA+PROBE: NEGATIVE
QT INTERVAL: 444 MS
QTC INTERVAL: 428 MS
RH BLD: POSITIVE
T&S EXPIRATION DATE: NORMAL

## 2022-09-20 PROCEDURE — 87641 MR-STAPH DNA AMP PROBE: CPT

## 2022-09-20 PROCEDURE — 93005 ELECTROCARDIOGRAM TRACING: CPT

## 2022-09-20 PROCEDURE — 93010 ELECTROCARDIOGRAM REPORT: CPT | Performed by: INTERNAL MEDICINE

## 2022-09-20 PROCEDURE — 36415 COLL VENOUS BLD VENIPUNCTURE: CPT

## 2022-09-20 PROCEDURE — 86900 BLOOD TYPING SEROLOGIC ABO: CPT

## 2022-09-20 PROCEDURE — 86901 BLOOD TYPING SEROLOGIC RH(D): CPT

## 2022-09-20 PROCEDURE — 86850 RBC ANTIBODY SCREEN: CPT

## 2022-09-20 PROCEDURE — S0260 H&P FOR SURGERY: HCPCS | Performed by: ORTHOPAEDIC SURGERY

## 2022-09-20 RX ORDER — TRAZODONE HYDROCHLORIDE 150 MG/1
150 TABLET ORAL NIGHTLY
COMMUNITY
End: 2022-10-17

## 2022-09-20 RX ORDER — LOVASTATIN 40 MG/1
40 TABLET ORAL NIGHTLY
COMMUNITY
End: 2023-01-16

## 2022-09-20 RX ORDER — ESCITALOPRAM OXALATE 10 MG/1
10 TABLET ORAL NIGHTLY
COMMUNITY
End: 2022-10-17

## 2022-09-20 RX ORDER — SODIUM CHLORIDE, SODIUM GLUCONATE, SODIUM ACETATE, POTASSIUM CHLORIDE AND MAGNESIUM CHLORIDE 526; 502; 368; 37; 30 MG/100ML; MG/100ML; MG/100ML; MG/100ML; MG/100ML
1000 INJECTION, SOLUTION INTRAVENOUS CONTINUOUS PRN
Status: CANCELLED | OUTPATIENT
Start: 2022-09-26

## 2022-09-20 ASSESSMENT — HOOS JR
HOOS JR SCORE: 16
HOOS JR SCORE: 47.487

## 2022-09-20 NOTE — PAT
Chlorhexidine scrub given with instruction sheet. Instructions reviewed in PAT, understanding verbalized.    ERAS instructions given.    Patient educated about not having to come for COVID test on 9/23/22.    Patient has already had joint class.    Dr. Rutherford made aware via email of patient being put on antibiotics for UTI on 9/16/22 and completing course on 9/23/22.

## 2022-09-20 NOTE — PROGRESS NOTES
Marielle Hansen is a 72 y.o. female returns for     Chief Complaint   Patient presents with   • Left Hip - Pre-op Exam       HISTORY OF PRESENT ILLNESS:  F/u left hip pain. Patient states injection done on 5/6/2022 has not helped with pain.    She continues to have pain in the left hip and has difficulty bearing weight on the left side.  She has pain with activities of daily living.  Mostly she has dull aches but she has occasional sharp pains as well.  She is unhappy with her quality of life and wishes to discuss definitive treatment options.       CONCURRENT MEDICAL HISTORY:    Past Medical History:   Diagnosis Date   • Arthritis    • Disease of thyroid gland    • Diverticulitis    • GERD (gastroesophageal reflux disease)    • Hiatal hernia    • Hyperlipidemia    • Hypertension    • TIA (transient ischemic attack)        Allergies   Allergen Reactions   • Codeine Hives   • Famotidine GI Intolerance       Current Outpatient Medications on File Prior to Visit   Medication Sig   • albuterol sulfate  (90 Base) MCG/ACT inhaler Inhale 2 puffs Every 4 (Four) Hours As Needed for Wheezing.   • amLODIPine (NORVASC) 5 MG tablet Take 1 tablet by mouth Daily.   • Cholecalciferol (Vitamin D3) 1.25 MG (12922 UT) capsule Take 1 capsule by mouth 2 (Two) Times a Week.   • cyanocobalamin 1000 MCG/ML injection Inject 1 mL into the appropriate muscle as directed by prescriber Every 30 (Thirty) Days.   • DRYSOL 20 % external solution Daily.   • escitalopram (LEXAPRO) 10 MG tablet Take 1 tablet by mouth Daily.   • HYDROcodone-acetaminophen (Norco) 7.5-325 MG per tablet Take 1 tablet by mouth Every 6 (Six) Hours As Needed for Moderate Pain.   • levothyroxine (SYNTHROID, LEVOTHROID) 137 MCG tablet Take 1 tablet by mouth Daily.   • lovastatin (MEVACOR) 40 MG tablet Take 1 tablet by mouth Daily.   • metoprolol succinate XL (TOPROL-XL) 100 MG 24 hr tablet Take 1 tablet by mouth Daily.   • olmesartan-hydrochlorothiazide  "(BENICAR HCT) 40-12.5 MG per tablet Take 1 tablet by mouth Daily.   • ondansetron ODT (Zofran ODT) 4 MG disintegrating tablet Place 1 tablet on the tongue Every 8 (Eight) Hours As Needed for Nausea or Vomiting.   • sulfamethoxazole-trimethoprim (Bactrim DS) 800-160 MG per tablet Take 1 tablet by mouth 2 (Two) Times a Day.   • traZODone (DESYREL) 300 MG tablet Take 1 tablet by mouth Every Night.   • [DISCONTINUED] colchicine 0.6 MG tablet 2 tablets by mouth this morning, 1 tablet an hour later.  Then 1 tablet BID   • [DISCONTINUED] famotidine (Pepcid) 40 MG tablet Take 1 tablet by mouth At Night As Needed for Heartburn.   • [DISCONTINUED] Syringe 22G X 1\" 3 ML misc Use to inject B-12 injection into appropriate muscle     No current facility-administered medications on file prior to visit.       Past Surgical History:   Procedure Laterality Date   • APPENDECTOMY     • CHOLECYSTECTOMY     • COLONOSCOPY N/A 1/30/2020    Procedure: COLONOSCOPY;  Surgeon: Ashlyn King MD;  Location: [facility] ENDOSCOPY;  Service: Gastroenterology   • COLONOSCOPY N/A 3/3/2022    Procedure: COLONOSCOPY;  Surgeon: Ashlyn King MD;  Location: [facility] ENDOSCOPY;  Service: Gastroenterology;  Laterality: N/A;   • CYSTOSCOPY, URETEROSCOPY, RETROGRADE PYELOGRAM, STENT INSERTION Left 7/2/2020    Procedure: CYSTOSCOPY LEFT URETEROSCOPY RETROGRADE PYELOGRAM STENT INSERTION;  Surgeon: Johnny Chino MD;  Location: [facility] OR;  Service: Urology;  Laterality: Left;   • ENDOSCOPY N/A 1/30/2020    Procedure: ESOPHAGOGASTRODUODENOSCOPY;  Surgeon: Ashlyn King MD;  Location: [facility] ENDOSCOPY;  Service: Gastroenterology   • ENDOSCOPY N/A 3/3/2022    Procedure: ESOPHAGOGASTRODUODENOSCOPY;  Surgeon: Ashlyn King MD;  Location: [facility] ENDOSCOPY;  Service: Gastroenterology;  Laterality: N/A;   • EXTRACORPOREAL SHOCKWAVE LITHOTRIPSY (ESWL), STENT INSERTION/REMOVAL Left 6/25/2020    Procedure: LEFT EXTRACORPOREAL SHOCKWAVE LITHOTRIPSY;  " "Surgeon: [place], Johnny WOLFF MD;  Location: [facility];  Service: Urology;  Laterality: Left;   • HYSTERECTOMY     • JOINT REPLACEMENT Bilateral    • TOTAL KNEE ARTHROPLASTY Bilateral        No family history on file.    Social History     Socioeconomic History   • Marital status:    Tobacco Use   • Smoking status: Former Smoker   • Smokeless tobacco: Never Used   • Tobacco comment: quit 20 years ago    Vaping Use   • Vaping Use: Never used   Substance and Sexual Activity   • Alcohol use: No   • Drug use: Never   • Sexual activity: Defer            ROS  No fevers or chills.  No chest pain or shortness of air.  No GI or  disturbances.  Other than left hip pain, all other systems reviewed is negative.    PHYSICAL EXAMINATION:       Ht 170.2 cm (67\")   Wt 95.3 kg (210 lb)   BMI 32.89 kg/m²     Physical Exam  Vitals reviewed.   Constitutional:       General: She is not in acute distress.     Appearance: Normal appearance. She is well-developed.   Cardiovascular:      Rate and Rhythm: Normal rate and regular rhythm.      Heart sounds: Normal heart sounds.   Pulmonary:      Effort: Pulmonary effort is normal. No respiratory distress.      Breath sounds: Normal breath sounds.   Abdominal:      General: Bowel sounds are normal.      Palpations: Abdomen is soft.   Neurological:      Mental Status: She is alert and oriented to person, place, and time.   Psychiatric:         Behavior: Behavior normal.         Thought Content: Thought content normal.         Judgment: Judgment normal.         GAIT:     []  Normal  [x]  Antalgic    Assistive device: [x]  None  []  Walker     []  Crutches  []  Cane     []  Wheelchair  []  Stretcher    Left Hip Exam     Tenderness   Left hip tenderness location: diffuse.    Range of Motion   Flexion: 110   External rotation: 40   Internal rotation: 10     Muscle Strength   Flexion: 4/5     Tests   JULIUS: positive  Fadir:  Positive FADIR test    Other   Erythema: absent  Sensation: " normal  Pulse: present    Comments:  + logroll test  + stinchfield test                XR Hips Bilateral With or Without Pelvis 3-4 View  Order date: 1/11/2022  Authorizing: Johnny Rutherford MD  Ordered by Johnny Rutherford MD on 1/11/2022.       Narrative & Impression    Ordering Provider:  Johnny Rutherford MD  Ordering Diagnosis/Indication:  Bilateral hip pain     Procedure:  XR HIPS BILATERAL W OR WO PELVIS 3-4 VIEW  Exam Date:  1/11/22     COMPARISON:  Not applicable, no relevant images available.     IMPRESSION: AP standing of the pelvis with AP and lateral of both hips show acceptable position and alignment with no evidence of acute bony abnormality in either hip.  Both hips show moderate arthritic changes with loss of sphericity of the femoral head as well as osteophytes on the superior and inferior margins of the femoral head.  No fracture or dislocation is noted.  Mild arthritic changes noted in the visible portions of the lumbar spine.        Johnny Rutherford MD  1/11/22              ASSESSMENT:    Diagnoses and all orders for this visit:    Primary osteoarthritis of left hip    Essential hypertension    Mild intermittent asthma without complication          PLAN    She has osteoarthritis in both hips.  The left hip is worse than the right.  She has tried activity modification as well as general strength and conditioning exercises.  She has tried anti-inflammatory medication  As well as use of assistive devices.  She has pain with activity of daily living and is unhappy with her quality of life.  I encouraged her to use a cane for support with walking.    The patient voiced understanding of the risks, benefits, and alternative forms of treatment that were discussed and the patient consents to proceed with surgery.  All risks, benefits and alternatives were discussed.  Risks include, but not exclusive to anesthetic complications, including death, MI, CVA, infection, bleeding DVT,  fracture, residual pain and need for future surgery.    This discussion was held with the patient by Johnny Rutherford MD and all questions were answered.    Plan left total hip arthroplasty.    We discussed overnight stay.    Return for Post-operative eval.    Johnny Rutherford MD

## 2022-09-20 NOTE — DISCHARGE INSTRUCTIONS
[facility]  Pre-op Information and Guidelines    You will be called after 2 p.m. the day before your surgery (Friday for Monday surgery) and notified of your time for arrival and approximate surgery time.  If you have not received a call by 4P.M., please contact Same Day Surgery at [phone] of if outside [facility] call [phone].    Please Follow these Important Safety Guidelines:    The morning of your procedure, take only the medications listed below with   A sip of water:_____________________________________________       ______________________________________________    DO NOT eat or drink anything after 12:00 midnight the night before surgery  Specific instructions concerning drinking clear liquids will be discussed during  the pre-surgery instruction call the day before your surgery.    If you take a blood thinner (ex. Plavix, Coumadin, aspirin), ask your doctor when to stop it before surgery  STOP DATE: _________________    Only 2 visitors are allowed in patient rooms at a time  Your visitors will be asked to wait in the lobby until the admission process is complete with the exception of a parent with a child and patients in need of special assistance.    YOU CANNOT DRIVE YOURSELF HOME  You must be accompanied by someone who will be responsible for driving you home after surgery and for your care at home.    DO NOT chew gum, use breath mints, hard candy, or smoke the day of surgery  DO NOT drink alcohol for at least 24 hours before your surgery  DO NOT wear any jewelry and remove all body piercing before coming to the hospital  DO NOT wear make-up to the hospital  If you are having surgery on an extremity (arm/leg/foot) remove nail polish/artificial nails on the surgical side  Clothing, glasses, contacts, dentures, and hairpieces must be removed before surgery  Bathe the night before or the morning of your surgery and do not use powders/lotions on skin.    Nothing  solid/dairy after midnight the night before. Can have clear liquids up until 3 hours prior to procedure time. Drink 20 ounces (or until you feel full) of Gatorade and complete 3 hours prior to procedure start time.

## 2022-09-25 ENCOUNTER — ANESTHESIA EVENT (OUTPATIENT)
Dept: PERIOP | Facility: HOSPITAL | Age: 72
End: 2022-09-25

## 2022-09-26 ENCOUNTER — APPOINTMENT (OUTPATIENT)
Dept: GENERAL RADIOLOGY | Facility: HOSPITAL | Age: 72
End: 2022-09-26

## 2022-09-26 ENCOUNTER — HOSPITAL ENCOUNTER (OUTPATIENT)
Facility: HOSPITAL | Age: 72
Discharge: HOME HEALTH | End: 2022-09-29
Attending: ORTHOPAEDIC SURGERY | Admitting: ORTHOPAEDIC SURGERY

## 2022-09-26 ENCOUNTER — ANESTHESIA (OUTPATIENT)
Dept: PERIOP | Facility: HOSPITAL | Age: 72
End: 2022-09-26

## 2022-09-26 DIAGNOSIS — I10 ESSENTIAL HYPERTENSION: ICD-10-CM

## 2022-09-26 DIAGNOSIS — Z74.09 IMPAIRED MOBILITY AND ACTIVITIES OF DAILY LIVING: ICD-10-CM

## 2022-09-26 DIAGNOSIS — M16.11 PRIMARY OSTEOARTHRITIS OF RIGHT HIP: ICD-10-CM

## 2022-09-26 DIAGNOSIS — Z78.9 IMPAIRED MOBILITY AND ACTIVITIES OF DAILY LIVING: ICD-10-CM

## 2022-09-26 DIAGNOSIS — M16.12 PRIMARY OSTEOARTHRITIS OF LEFT HIP: Primary | ICD-10-CM

## 2022-09-26 DIAGNOSIS — Z74.09 IMPAIRED FUNCTIONAL MOBILITY, BALANCE, GAIT, AND ENDURANCE: ICD-10-CM

## 2022-09-26 DIAGNOSIS — M48.061 SPINAL STENOSIS OF LUMBAR REGION WITHOUT NEUROGENIC CLAUDICATION: ICD-10-CM

## 2022-09-26 DIAGNOSIS — J45.20 MILD INTERMITTENT ASTHMA WITHOUT COMPLICATION: ICD-10-CM

## 2022-09-26 LAB
ABO GROUP BLD: NORMAL
BLD GP AB SCN SERPL QL: NEGATIVE
HCT VFR BLD AUTO: 35.2 % (ref 34–46.6)
HGB BLD-MCNC: 11.6 G/DL (ref 12–15.9)
Lab: NORMAL
RH BLD: POSITIVE
T&S EXPIRATION DATE: NORMAL

## 2022-09-26 PROCEDURE — 25010000002 DEXAMETHASONE PER 1 MG: Performed by: ANESTHESIOLOGY

## 2022-09-26 PROCEDURE — 25010000002 CEFAZOLIN PER 500 MG: Performed by: ORTHOPAEDIC SURGERY

## 2022-09-26 PROCEDURE — 27130 TOTAL HIP ARTHROPLASTY: CPT | Performed by: ORTHOPAEDIC SURGERY

## 2022-09-26 PROCEDURE — 63710000001 OXYCODONE 5 MG TABLET: Performed by: ORTHOPAEDIC SURGERY

## 2022-09-26 PROCEDURE — A9270 NON-COVERED ITEM OR SERVICE: HCPCS | Performed by: ORTHOPAEDIC SURGERY

## 2022-09-26 PROCEDURE — 63710000001 ACETAMINOPHEN 500 MG TABLET: Performed by: ORTHOPAEDIC SURGERY

## 2022-09-26 PROCEDURE — 63710000001 SENNOSIDES-DOCUSATE 8.6-50 MG TABLET: Performed by: ORTHOPAEDIC SURGERY

## 2022-09-26 PROCEDURE — 63710000001 HYDROCHLOROTHIAZIDE 12.5 MG TABLET 100 EACH BOTTLE: Performed by: ORTHOPAEDIC SURGERY

## 2022-09-26 PROCEDURE — 0 BUPIVACAINE LIPOSOME 1.3 % SUSPENSION: Performed by: ORTHOPAEDIC SURGERY

## 2022-09-26 PROCEDURE — 76000 FLUOROSCOPY <1 HR PHYS/QHP: CPT

## 2022-09-26 PROCEDURE — 63710000001 FAMOTIDINE 40 MG TABLET: Performed by: ORTHOPAEDIC SURGERY

## 2022-09-26 PROCEDURE — 85014 HEMATOCRIT: CPT | Performed by: ORTHOPAEDIC SURGERY

## 2022-09-26 PROCEDURE — 63710000001 TRAZODONE 150 MG TABLET: Performed by: ORTHOPAEDIC SURGERY

## 2022-09-26 PROCEDURE — 25010000002 HYDROMORPHONE 1 MG/ML SOLUTION: Performed by: ANESTHESIOLOGY

## 2022-09-26 PROCEDURE — 25010000002 ONDANSETRON PER 1 MG: Performed by: ORTHOPAEDIC SURGERY

## 2022-09-26 PROCEDURE — 63710000001 ESCITALOPRAM 10 MG TABLET: Performed by: ORTHOPAEDIC SURGERY

## 2022-09-26 PROCEDURE — 97162 PT EVAL MOD COMPLEX 30 MIN: CPT

## 2022-09-26 PROCEDURE — 94799 UNLISTED PULMONARY SVC/PX: CPT

## 2022-09-26 PROCEDURE — 86850 RBC ANTIBODY SCREEN: CPT | Performed by: ANESTHESIOLOGY

## 2022-09-26 PROCEDURE — 25010000002 PROPOFOL 10 MG/ML EMULSION: Performed by: ANESTHESIOLOGY

## 2022-09-26 PROCEDURE — 97166 OT EVAL MOD COMPLEX 45 MIN: CPT

## 2022-09-26 PROCEDURE — 85018 HEMOGLOBIN: CPT | Performed by: ORTHOPAEDIC SURGERY

## 2022-09-26 PROCEDURE — C9290 INJ, BUPIVACAINE LIPOSOME: HCPCS | Performed by: ORTHOPAEDIC SURGERY

## 2022-09-26 PROCEDURE — 63710000001 LOSARTAN 50 MG TABLET 1 EACH BLISTER: Performed by: ORTHOPAEDIC SURGERY

## 2022-09-26 PROCEDURE — 86900 BLOOD TYPING SEROLOGIC ABO: CPT | Performed by: ANESTHESIOLOGY

## 2022-09-26 PROCEDURE — 25010000002 MIDAZOLAM PER 1 MG: Performed by: ANESTHESIOLOGY

## 2022-09-26 PROCEDURE — C1776 JOINT DEVICE (IMPLANTABLE): HCPCS | Performed by: ORTHOPAEDIC SURGERY

## 2022-09-26 PROCEDURE — 88300 SURGICAL PATH GROSS: CPT

## 2022-09-26 PROCEDURE — 25010000002 ROPIVACAINE PER 1 MG: Performed by: ANESTHESIOLOGY

## 2022-09-26 PROCEDURE — 76942 ECHO GUIDE FOR BIOPSY: CPT | Performed by: ORTHOPAEDIC SURGERY

## 2022-09-26 PROCEDURE — 25010000002 FENTANYL CITRATE (PF) 50 MCG/ML SOLUTION: Performed by: ANESTHESIOLOGY

## 2022-09-26 PROCEDURE — 25010000002 HYDROMORPHONE 1 MG/ML SOLUTION: Performed by: ORTHOPAEDIC SURGERY

## 2022-09-26 PROCEDURE — 86901 BLOOD TYPING SEROLOGIC RH(D): CPT | Performed by: ANESTHESIOLOGY

## 2022-09-26 DEVICE — PINNACLE CANCELLOUS BONE SCREW 6.5MM X 30MM
Type: IMPLANTABLE DEVICE | Site: HIP | Status: FUNCTIONAL
Brand: PINNACLE

## 2022-09-26 DEVICE — BIOLOX DELTA CERAMIC FEMORAL HEAD +1.5 36MM DIA 12/14 TAPER
Type: IMPLANTABLE DEVICE | Site: HIP | Status: FUNCTIONAL
Brand: BIOLOX DELTA

## 2022-09-26 DEVICE — CORAIL HIP SYSTEM CEMENTLESS FEMORAL STEM 12/14 AMT 135 DEGREES KA SIZE 13 HA COATED STANDARD COLLAR
Type: IMPLANTABLE DEVICE | Site: HIP | Status: FUNCTIONAL
Brand: CORAIL

## 2022-09-26 DEVICE — DEV CONTRL TISS STRATAFIXSPIRAL PDO BIDIR MH 2/0 24X24CM: Type: IMPLANTABLE DEVICE | Site: HIP | Status: FUNCTIONAL

## 2022-09-26 DEVICE — PINNACLE GRIPTION ACETABULAR SHELL SECTOR 52MM OD
Type: IMPLANTABLE DEVICE | Site: HIP | Status: FUNCTIONAL
Brand: PINNACLE GRIPTION

## 2022-09-26 DEVICE — TOTL HIP COP DEPUY 9641334: Type: IMPLANTABLE DEVICE | Site: HIP | Status: FUNCTIONAL

## 2022-09-26 DEVICE — DEV CONTRL TISS STRATAFIXSPIRALMNCRYL PLSPS2 REV4/0 30CM: Type: IMPLANTABLE DEVICE | Site: HIP | Status: FUNCTIONAL

## 2022-09-26 DEVICE — PINNACLE HIP SOLUTIONS ALTRX POLYETHYLENE ACETABULAR LINER +4 NEUTRAL 36MM ID 52MM OD
Type: IMPLANTABLE DEVICE | Site: HIP | Status: FUNCTIONAL
Brand: PINNACLE ALTRX

## 2022-09-26 DEVICE — DEV CONTRL TISS STRATAFIX SPIRAL PDO DBLARM 0 24X24CM: Type: IMPLANTABLE DEVICE | Site: HIP | Status: FUNCTIONAL

## 2022-09-26 RX ORDER — PROMETHAZINE HYDROCHLORIDE 25 MG/1
25 TABLET ORAL ONCE AS NEEDED
Status: DISCONTINUED | OUTPATIENT
Start: 2022-09-26 | End: 2022-09-26 | Stop reason: HOSPADM

## 2022-09-26 RX ORDER — SODIUM CHLORIDE 9 MG/ML
100 INJECTION, SOLUTION INTRAVENOUS CONTINUOUS
Status: ACTIVE | OUTPATIENT
Start: 2022-09-26 | End: 2022-09-27

## 2022-09-26 RX ORDER — SODIUM CHLORIDE 0.9 % (FLUSH) 0.9 %
3-10 SYRINGE (ML) INJECTION AS NEEDED
Status: DISCONTINUED | OUTPATIENT
Start: 2022-09-26 | End: 2022-09-29 | Stop reason: HOSPADM

## 2022-09-26 RX ORDER — MIDAZOLAM HYDROCHLORIDE 1 MG/ML
INJECTION INTRAMUSCULAR; INTRAVENOUS AS NEEDED
Status: DISCONTINUED | OUTPATIENT
Start: 2022-09-26 | End: 2022-09-26 | Stop reason: SURG

## 2022-09-26 RX ORDER — ACETAMINOPHEN 650 MG/1
650 SUPPOSITORY RECTAL ONCE AS NEEDED
Status: DISCONTINUED | OUTPATIENT
Start: 2022-09-26 | End: 2022-09-26 | Stop reason: HOSPADM

## 2022-09-26 RX ORDER — AMLODIPINE BESYLATE 5 MG/1
5 TABLET ORAL DAILY
Status: DISCONTINUED | OUTPATIENT
Start: 2022-09-27 | End: 2022-09-29 | Stop reason: HOSPADM

## 2022-09-26 RX ORDER — ONDANSETRON 4 MG/1
4 TABLET, FILM COATED ORAL EVERY 6 HOURS PRN
Status: DISCONTINUED | OUTPATIENT
Start: 2022-09-26 | End: 2022-09-29 | Stop reason: HOSPADM

## 2022-09-26 RX ORDER — BUPIVACAINE HCL/0.9 % NACL/PF 0.1 %
2 PLASTIC BAG, INJECTION (ML) EPIDURAL EVERY 8 HOURS
Status: COMPLETED | OUTPATIENT
Start: 2022-09-26 | End: 2022-09-27

## 2022-09-26 RX ORDER — FAMOTIDINE 40 MG/1
40 TABLET, FILM COATED ORAL DAILY
Status: DISCONTINUED | OUTPATIENT
Start: 2022-09-26 | End: 2022-09-29 | Stop reason: HOSPADM

## 2022-09-26 RX ORDER — BUPIVACAINE HYDROCHLORIDE 7.5 MG/ML
INJECTION, SOLUTION EPIDURAL; RETROBULBAR
Status: COMPLETED | OUTPATIENT
Start: 2022-09-26 | End: 2022-09-26

## 2022-09-26 RX ORDER — AMOXICILLIN 250 MG
2 CAPSULE ORAL NIGHTLY
Status: DISCONTINUED | OUTPATIENT
Start: 2022-09-26 | End: 2022-09-29 | Stop reason: HOSPADM

## 2022-09-26 RX ORDER — 0.9 % SODIUM CHLORIDE 0.9 %
VIAL (ML) INJECTION AS NEEDED
Status: DISCONTINUED | OUTPATIENT
Start: 2022-09-26 | End: 2022-09-26 | Stop reason: HOSPADM

## 2022-09-26 RX ORDER — FENTANYL CITRATE 50 UG/ML
INJECTION, SOLUTION INTRAMUSCULAR; INTRAVENOUS AS NEEDED
Status: DISCONTINUED | OUTPATIENT
Start: 2022-09-26 | End: 2022-09-26 | Stop reason: SURG

## 2022-09-26 RX ORDER — PROMETHAZINE HYDROCHLORIDE 25 MG/1
25 SUPPOSITORY RECTAL ONCE AS NEEDED
Status: DISCONTINUED | OUTPATIENT
Start: 2022-09-26 | End: 2022-09-26 | Stop reason: HOSPADM

## 2022-09-26 RX ORDER — ONDANSETRON 2 MG/ML
4 INJECTION INTRAMUSCULAR; INTRAVENOUS EVERY 6 HOURS PRN
Status: DISCONTINUED | OUTPATIENT
Start: 2022-09-26 | End: 2022-09-29 | Stop reason: HOSPADM

## 2022-09-26 RX ORDER — EPHEDRINE SULFATE 50 MG/ML
5 INJECTION, SOLUTION INTRAVENOUS ONCE AS NEEDED
Status: DISCONTINUED | OUTPATIENT
Start: 2022-09-26 | End: 2022-09-26 | Stop reason: HOSPADM

## 2022-09-26 RX ORDER — OXYCODONE HYDROCHLORIDE 5 MG/1
5 TABLET ORAL EVERY 4 HOURS PRN
Status: DISCONTINUED | OUTPATIENT
Start: 2022-09-26 | End: 2022-09-29 | Stop reason: HOSPADM

## 2022-09-26 RX ORDER — PREGABALIN 75 MG/1
75 CAPSULE ORAL ONCE
Status: COMPLETED | OUTPATIENT
Start: 2022-09-26 | End: 2022-09-26

## 2022-09-26 RX ORDER — FERROUS SULFATE TAB EC 324 MG (65 MG FE EQUIVALENT) 324 (65 FE) MG
324 TABLET DELAYED RESPONSE ORAL
Status: DISCONTINUED | OUTPATIENT
Start: 2022-09-27 | End: 2022-09-29 | Stop reason: HOSPADM

## 2022-09-26 RX ORDER — SODIUM CHLORIDE, SODIUM GLUCONATE, SODIUM ACETATE, POTASSIUM CHLORIDE AND MAGNESIUM CHLORIDE 526; 502; 368; 37; 30 MG/100ML; MG/100ML; MG/100ML; MG/100ML; MG/100ML
1000 INJECTION, SOLUTION INTRAVENOUS CONTINUOUS PRN
Status: DISCONTINUED | OUTPATIENT
Start: 2022-09-26 | End: 2022-09-29 | Stop reason: HOSPADM

## 2022-09-26 RX ORDER — ACETAMINOPHEN 500 MG
1000 TABLET ORAL 4 TIMES DAILY
Status: DISCONTINUED | OUTPATIENT
Start: 2022-09-26 | End: 2022-09-29 | Stop reason: HOSPADM

## 2022-09-26 RX ORDER — FLUMAZENIL 0.1 MG/ML
0.2 INJECTION INTRAVENOUS AS NEEDED
Status: DISCONTINUED | OUTPATIENT
Start: 2022-09-26 | End: 2022-09-26 | Stop reason: HOSPADM

## 2022-09-26 RX ORDER — ACETAMINOPHEN 325 MG/1
650 TABLET ORAL ONCE AS NEEDED
Status: DISCONTINUED | OUTPATIENT
Start: 2022-09-26 | End: 2022-09-26 | Stop reason: HOSPADM

## 2022-09-26 RX ORDER — SODIUM CHLORIDE 0.9 % (FLUSH) 0.9 %
3 SYRINGE (ML) INJECTION EVERY 12 HOURS SCHEDULED
Status: DISCONTINUED | OUTPATIENT
Start: 2022-09-26 | End: 2022-09-29 | Stop reason: HOSPADM

## 2022-09-26 RX ORDER — NALOXONE HCL 0.4 MG/ML
0.1 VIAL (ML) INJECTION
Status: DISCONTINUED | OUTPATIENT
Start: 2022-09-26 | End: 2022-09-29 | Stop reason: HOSPADM

## 2022-09-26 RX ORDER — ONDANSETRON 2 MG/ML
4 INJECTION INTRAMUSCULAR; INTRAVENOUS ONCE AS NEEDED
Status: DISCONTINUED | OUTPATIENT
Start: 2022-09-26 | End: 2022-09-26 | Stop reason: HOSPADM

## 2022-09-26 RX ORDER — TRAZODONE HYDROCHLORIDE 150 MG/1
150 TABLET ORAL NIGHTLY
Status: DISCONTINUED | OUTPATIENT
Start: 2022-09-26 | End: 2022-09-29 | Stop reason: HOSPADM

## 2022-09-26 RX ORDER — ACETAMINOPHEN 500 MG
1000 TABLET ORAL ONCE
Status: COMPLETED | OUTPATIENT
Start: 2022-09-26 | End: 2022-09-26

## 2022-09-26 RX ORDER — ALBUTEROL SULFATE 2.5 MG/3ML
2.5 SOLUTION RESPIRATORY (INHALATION) EVERY 4 HOURS PRN
Status: DISCONTINUED | OUTPATIENT
Start: 2022-09-26 | End: 2022-09-29 | Stop reason: HOSPADM

## 2022-09-26 RX ORDER — PROPOFOL 10 MG/ML
VIAL (ML) INTRAVENOUS AS NEEDED
Status: DISCONTINUED | OUTPATIENT
Start: 2022-09-26 | End: 2022-09-26 | Stop reason: SURG

## 2022-09-26 RX ORDER — MEPERIDINE HYDROCHLORIDE 25 MG/ML
12.5 INJECTION INTRAMUSCULAR; INTRAVENOUS; SUBCUTANEOUS
Status: DISCONTINUED | OUTPATIENT
Start: 2022-09-26 | End: 2022-09-26 | Stop reason: HOSPADM

## 2022-09-26 RX ORDER — METOPROLOL SUCCINATE 50 MG/1
100 TABLET, EXTENDED RELEASE ORAL DAILY
Status: DISCONTINUED | OUTPATIENT
Start: 2022-09-27 | End: 2022-09-29 | Stop reason: HOSPADM

## 2022-09-26 RX ORDER — NALOXONE HCL 0.4 MG/ML
0.4 VIAL (ML) INJECTION AS NEEDED
Status: DISCONTINUED | OUTPATIENT
Start: 2022-09-26 | End: 2022-09-26 | Stop reason: HOSPADM

## 2022-09-26 RX ORDER — ESCITALOPRAM OXALATE 10 MG/1
10 TABLET ORAL NIGHTLY
Status: DISCONTINUED | OUTPATIENT
Start: 2022-09-26 | End: 2022-09-29 | Stop reason: HOSPADM

## 2022-09-26 RX ORDER — BUPIVACAINE HCL/0.9 % NACL/PF 0.1 %
2 PLASTIC BAG, INJECTION (ML) EPIDURAL ONCE
Status: COMPLETED | OUTPATIENT
Start: 2022-09-26 | End: 2022-09-26

## 2022-09-26 RX ORDER — DEXAMETHASONE SODIUM PHOSPHATE 4 MG/ML
INJECTION, SOLUTION INTRA-ARTICULAR; INTRALESIONAL; INTRAMUSCULAR; INTRAVENOUS; SOFT TISSUE AS NEEDED
Status: DISCONTINUED | OUTPATIENT
Start: 2022-09-26 | End: 2022-09-26 | Stop reason: SURG

## 2022-09-26 RX ORDER — BUPIVACAINE HYDROCHLORIDE 2.5 MG/ML
INJECTION, SOLUTION EPIDURAL; INFILTRATION; INTRACAUDAL AS NEEDED
Status: DISCONTINUED | OUTPATIENT
Start: 2022-09-26 | End: 2022-09-26 | Stop reason: HOSPADM

## 2022-09-26 RX ORDER — DIPHENHYDRAMINE HYDROCHLORIDE 50 MG/ML
12.5 INJECTION INTRAMUSCULAR; INTRAVENOUS
Status: DISCONTINUED | OUTPATIENT
Start: 2022-09-26 | End: 2022-09-26 | Stop reason: HOSPADM

## 2022-09-26 RX ORDER — MELOXICAM 15 MG/1
15 TABLET ORAL ONCE
Status: COMPLETED | OUTPATIENT
Start: 2022-09-26 | End: 2022-09-26

## 2022-09-26 RX ORDER — OXYCODONE HCL 10 MG/1
10 TABLET, FILM COATED, EXTENDED RELEASE ORAL ONCE
Status: COMPLETED | OUTPATIENT
Start: 2022-09-26 | End: 2022-09-26

## 2022-09-26 RX ORDER — ROPIVACAINE HYDROCHLORIDE 5 MG/ML
INJECTION, SOLUTION EPIDURAL; INFILTRATION; PERINEURAL
Status: COMPLETED | OUTPATIENT
Start: 2022-09-26 | End: 2022-09-26

## 2022-09-26 RX ADMIN — ROPIVACAINE HYDROCHLORIDE 30 ML: 5 INJECTION, SOLUTION EPIDURAL; INFILTRATION; PERINEURAL at 08:55

## 2022-09-26 RX ADMIN — HYDROMORPHONE HYDROCHLORIDE 0.5 MG: 1 INJECTION, SOLUTION INTRAMUSCULAR; INTRAVENOUS; SUBCUTANEOUS at 13:43

## 2022-09-26 RX ADMIN — CEFAZOLIN 2 G: 10 INJECTION, POWDER, FOR SOLUTION INTRAVENOUS; PARENTERAL at 18:04

## 2022-09-26 RX ADMIN — PROPOFOL 40 MCG/KG/MIN: 10 INJECTION, EMULSION INTRAVENOUS at 11:13

## 2022-09-26 RX ADMIN — TRAZODONE HYDROCHLORIDE 150 MG: 150 TABLET ORAL at 19:42

## 2022-09-26 RX ADMIN — HYDROMORPHONE HYDROCHLORIDE 0.5 MG: 1 INJECTION, SOLUTION INTRAMUSCULAR; INTRAVENOUS; SUBCUTANEOUS at 14:28

## 2022-09-26 RX ADMIN — FENTANYL CITRATE 25 MCG: 50 INJECTION INTRAMUSCULAR; INTRAVENOUS at 11:13

## 2022-09-26 RX ADMIN — TRANEXAMIC ACID 1000 MG: 100 INJECTION, SOLUTION INTRAVENOUS at 07:49

## 2022-09-26 RX ADMIN — PROPOFOL 20 MG: 10 INJECTION, EMULSION INTRAVENOUS at 11:28

## 2022-09-26 RX ADMIN — PREGABALIN 75 MG: 75 CAPSULE ORAL at 07:47

## 2022-09-26 RX ADMIN — ACETAMINOPHEN 1000 MG: 500 TABLET ORAL at 19:46

## 2022-09-26 RX ADMIN — FAMOTIDINE 40 MG: 40 TABLET, FILM COATED ORAL at 15:07

## 2022-09-26 RX ADMIN — BUPIVACAINE HYDROCHLORIDE 1.8 ML: 7.5 INJECTION, SOLUTION EPIDURAL; RETROBULBAR at 10:35

## 2022-09-26 RX ADMIN — ACETAMINOPHEN 1000 MG: 500 TABLET ORAL at 14:28

## 2022-09-26 RX ADMIN — Medication 2 G: at 10:38

## 2022-09-26 RX ADMIN — MIDAZOLAM HYDROCHLORIDE 2 MG: 1 INJECTION, SOLUTION INTRAMUSCULAR; INTRAVENOUS at 10:29

## 2022-09-26 RX ADMIN — DOCUSATE SODIUM 50 MG AND SENNOSIDES 8.6 MG 2 TABLET: 8.6; 5 TABLET, FILM COATED ORAL at 19:42

## 2022-09-26 RX ADMIN — ONDANSETRON 4 MG: 2 INJECTION INTRAMUSCULAR; INTRAVENOUS at 14:27

## 2022-09-26 RX ADMIN — Medication 3 ML: at 19:43

## 2022-09-26 RX ADMIN — SODIUM CHLORIDE, SODIUM GLUCONATE, SODIUM ACETATE, POTASSIUM CHLORIDE AND MAGNESIUM CHLORIDE: 526; 502; 368; 37; 30 INJECTION, SOLUTION INTRAVENOUS at 12:46

## 2022-09-26 RX ADMIN — ESCITALOPRAM OXALATE 10 MG: 10 TABLET ORAL at 19:42

## 2022-09-26 RX ADMIN — OXYCODONE 5 MG: 5 TABLET ORAL at 14:28

## 2022-09-26 RX ADMIN — DEXAMETHASONE SODIUM PHOSPHATE 4 MG: 4 INJECTION, SOLUTION INTRAMUSCULAR; INTRAVENOUS at 11:17

## 2022-09-26 RX ADMIN — MELOXICAM 15 MG: 15 TABLET ORAL at 07:47

## 2022-09-26 RX ADMIN — ACETAMINOPHEN 1000 MG: 500 TABLET ORAL at 07:47

## 2022-09-26 RX ADMIN — TRANEXAMIC ACID 1000 MG: 100 INJECTION, SOLUTION INTRAVENOUS at 12:23

## 2022-09-26 RX ADMIN — HYDROMORPHONE HYDROCHLORIDE 0.5 MG: 1 INJECTION, SOLUTION INTRAMUSCULAR; INTRAVENOUS; SUBCUTANEOUS at 13:29

## 2022-09-26 RX ADMIN — BUPIVACAINE 266 MG: 13.3 INJECTION, SUSPENSION, LIPOSOMAL INFILTRATION at 13:03

## 2022-09-26 RX ADMIN — SODIUM CHLORIDE, SODIUM GLUCONATE, SODIUM ACETATE, POTASSIUM CHLORIDE AND MAGNESIUM CHLORIDE 1000 ML: 526; 502; 368; 37; 30 INJECTION, SOLUTION INTRAVENOUS at 07:49

## 2022-09-26 RX ADMIN — LOSARTAN POTASSIUM: 50 TABLET, FILM COATED ORAL at 18:04

## 2022-09-26 RX ADMIN — OXYCODONE HYDROCHLORIDE 10 MG: 10 TABLET, FILM COATED, EXTENDED RELEASE ORAL at 07:47

## 2022-09-26 RX ADMIN — OXYCODONE 5 MG: 5 TABLET ORAL at 19:42

## 2022-09-26 NOTE — ANESTHESIA PROCEDURE NOTES
Peripheral Block      Patient reassessed immediately prior to procedure    Patient location during procedure: holding area  Stop time: 9/26/2022 8:56 AM  Reason for block: at surgeon's request and post-op pain management  Performed by  Anesthesiologist: Devon Roach MD  Assisted by: Tasneem Garcia SRNA  Preanesthetic Checklist  Completed: patient identified, IV checked, site marked, risks and benefits discussed, surgical consent, monitors and equipment checked, pre-op evaluation and timeout performed  Prep:  Pt Position: supine  Sterile barriers:cap, gloves, mask and washed/disinfected hands  Prep: ChloraPrep  Patient monitoring: blood pressure monitoring and continuous pulse oximetry  Procedure    Sedation: no  Performed under: local infiltration  Guidance:ultrasound guided    ULTRASOUND INTERPRETATION.  Using ultrasound guidance a 21 G gauge needle was placed in close proximity to the femoral nerve, at which point, under ultrasound guidance anesthetic was injected in the area of the nerve and spread of the anesthesia was seen on ultrasound in close proximity thereto.  There were no abnormalities seen on ultrasound; a digital image was taken; and the patient tolerated the procedure with no complications. Images:still images obtained, printed/placed on chart    Laterality:left  Block Type:fascia iliaca compartment  Injection Technique:single-shot  Needle Type:echogenic  Needle Gauge:21 G      Medications Used: ropivacaine (NAROPIN) 0.5 % injection, 30 mL  Med administered at 9/26/2022 8:55 AM      Medications  Comment:Pt ID'd  Ultrasound guided  Needle seen throughout  Local infiltration appropriate    Post Assessment  Injection Assessment: negative aspiration for heme, no paresthesia on injection and incremental injection  Patient Tolerance:comfortable throughout block  Complications:no

## 2022-09-26 NOTE — ANESTHESIA PREPROCEDURE EVALUATION
Anesthesia Evaluation     Patient summary reviewed and Nursing notes reviewed   no history of anesthetic complications:  NPO Solid Status: > 8 hours  NPO Liquid Status: > 2 hours           Airway   Mallampati: II  TM distance: >3 FB  Neck ROM: full  Possible difficult intubation  Dental    (+) edentulous, lower dentures and upper dentures    Pulmonary - normal exam    breath sounds clear to auscultation  (+) asthma,  (-) sleep apnea, not a smoker    ROS comment: snores  Cardiovascular   Exercise tolerance: poor (<4 METS)    ECG reviewed  Patient on routine beta blocker and Beta blocker given within 24 hours of surgery  Rhythm: regular  Rate: abnormal    (+) hypertension well controlled 2 medications or greater, hyperlipidemia,   (-) valvular problems/murmurs, dysrhythmias, angina, murmur, cardiac stents, DVT    ROS comment: Sinus bradycardia  Otherwise normal ECG  When compared with ECG of 19-JUN-2020 10:08,  No significant change was found    Neuro/Psych  (+) TIA, 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  Both hips show moderate arthritic changes with loss of sphericity of the femoral head as well as osteophytes on the superior and inferior margins of the femoral head.  No fracture or   dislocation is noted.  Mild arthritic changes noted in the visible portions of the lumbar spine.   Abdominal   (+) obese,    Substance History - negative use  (-) alcohol use, drug use     OB/GYN negative ob/gyn ROS         Other   arthritis (hips),       (-) history of cancer                Anesthesia Plan    ASA 3     MAC, regional and spinal     (Fascia iliaca block and spinal discussed and patient agrees to proceed)  intravenous induction     Anesthetic plan, risks, benefits, and alternatives have been provided, discussed and informed consent has been obtained with: patient and child.    Use of blood products discussed with patient  Consented to blood products.       CODE STATUS:

## 2022-09-26 NOTE — ANESTHESIA PROCEDURE NOTES
Spinal Block      Patient reassessed immediately prior to procedure    Patient location during procedure: OR  Start Time: 9/26/2022 10:35 AM  Stop Time: 9/26/2022 10:38 AM  Indication:procedure for pain  Performed By  Anesthesiologist: Pedro David MDSRNA: Tasneem Garcia SRNA  Preanesthetic Checklist  Completed: patient identified, IV checked, site marked, risks and benefits discussed, surgical consent, monitors and equipment checked, pre-op evaluation and timeout performed  Spinal Block Prep:  Patient Position:sitting  Sterile Tech:gloves and sterile barriers  Prep:Betadine  Patient Monitoring:blood pressure monitoring, continuous pulse oximetry and EKG  Spinal Block Procedure  Approach:midline  Guidance:palpation technique  Location:L4-L5  Needle Type:Rosa  Needle Gauge:22 G  Placement of Spinal needle event:cerebrospinal fluid aspirated and paresthesia  Paresthesia: Persistent  Fluid Appearance:clear  Medications: bupivacaine PF (MARCAINE) 0.75 % injection, 1.8 mL  Med Administered at 9/26/2022 10:35 AM   Post Assessment  Patient Tolerance:patient tolerated the procedure well with no apparent complications  Complications no

## 2022-09-26 NOTE — ANESTHESIA POSTPROCEDURE EVALUATION
Patient: Marielle Hansen    Procedure Summary     Date: 09/26/22 Room / Location: [facility] OR [address] [place] [address] OR    Anesthesia Start: 1028 Anesthesia Stop: 1301    Procedure: LEFT TOTAL HIP ARTHROPLASTY ANTERIOR (Left Hip) Diagnosis:       Primary osteoarthritis of left hip      Spinal stenosis of lumbar region without neurogenic claudication      Essential hypertension      Primary osteoarthritis of right hip      Mild intermittent asthma without complication      (Primary osteoarthritis of left hip [M16.12])      (Spinal stenosis of lumbar region without neurogenic claudication [M48.061])      (Essential hypertension [I10])      (Primary osteoarthritis of right hip [M16.11])      (Mild intermittent asthma without complication [J45.20])    Surgeons: Johnny Rutherford MD Provider: Pedro David MD    Anesthesia Type: MAC, regional, spinal ASA Status: 3          Anesthesia Type: MAC, regional, spinal    Vitals  No vitals data found for the desired time range.          Post Anesthesia Care and Evaluation    Patient location during evaluation: PACU  Patient participation: complete - patient participated  Level of consciousness: awake and alert  Pain score: 0  Pain management: adequate    Airway patency: patent  Anesthetic complications: No anesthetic complications  PONV Status: controlled  Cardiovascular status: acceptable  Respiratory status: acceptable  Hydration status: acceptable  Post Neuraxial Block status: No signs or symptoms of PDPH  Comments: 99.3, 55, 16, 99%

## 2022-09-27 LAB — REF LAB TEST METHOD: NORMAL

## 2022-09-27 PROCEDURE — 63710000001 LOSARTAN 50 MG TABLET 1 EACH BLISTER: Performed by: ORTHOPAEDIC SURGERY

## 2022-09-27 PROCEDURE — 63710000001 ACETAMINOPHEN 500 MG TABLET: Performed by: ORTHOPAEDIC SURGERY

## 2022-09-27 PROCEDURE — 97530 THERAPEUTIC ACTIVITIES: CPT

## 2022-09-27 PROCEDURE — A9270 NON-COVERED ITEM OR SERVICE: HCPCS | Performed by: ORTHOPAEDIC SURGERY

## 2022-09-27 PROCEDURE — 63710000001 HYDROCHLOROTHIAZIDE 12.5 MG TABLET 100 EACH BOTTLE: Performed by: ORTHOPAEDIC SURGERY

## 2022-09-27 PROCEDURE — 25010000002 CEFAZOLIN PER 500 MG: Performed by: ORTHOPAEDIC SURGERY

## 2022-09-27 PROCEDURE — 25010000002 HYDROMORPHONE 1 MG/ML SOLUTION: Performed by: ORTHOPAEDIC SURGERY

## 2022-09-27 PROCEDURE — 63710000001 FAMOTIDINE 40 MG TABLET: Performed by: ORTHOPAEDIC SURGERY

## 2022-09-27 PROCEDURE — 97110 THERAPEUTIC EXERCISES: CPT

## 2022-09-27 PROCEDURE — 97535 SELF CARE MNGMENT TRAINING: CPT

## 2022-09-27 PROCEDURE — 63710000001 FERROUS SULFATE 324 (65 FE) MG TABLET DELAYED-RELEASE: Performed by: ORTHOPAEDIC SURGERY

## 2022-09-27 PROCEDURE — 63710000001 AMLODIPINE 5 MG TABLET: Performed by: ORTHOPAEDIC SURGERY

## 2022-09-27 PROCEDURE — 97116 GAIT TRAINING THERAPY: CPT

## 2022-09-27 PROCEDURE — 63710000001 SENNOSIDES-DOCUSATE 8.6-50 MG TABLET: Performed by: ORTHOPAEDIC SURGERY

## 2022-09-27 PROCEDURE — 63710000001 LEVOTHYROXINE 112 MCG TABLET: Performed by: ORTHOPAEDIC SURGERY

## 2022-09-27 PROCEDURE — 63710000001 OXYCODONE 5 MG TABLET: Performed by: ORTHOPAEDIC SURGERY

## 2022-09-27 PROCEDURE — 99024 POSTOP FOLLOW-UP VISIT: CPT | Performed by: ORTHOPAEDIC SURGERY

## 2022-09-27 PROCEDURE — 25010000002 ONDANSETRON PER 1 MG: Performed by: ORTHOPAEDIC SURGERY

## 2022-09-27 PROCEDURE — 63710000001 ONDANSETRON PER 8 MG: Performed by: ORTHOPAEDIC SURGERY

## 2022-09-27 PROCEDURE — 63710000001 TRAZODONE 150 MG TABLET: Performed by: ORTHOPAEDIC SURGERY

## 2022-09-27 PROCEDURE — 63710000001 ESCITALOPRAM 10 MG TABLET: Performed by: ORTHOPAEDIC SURGERY

## 2022-09-27 PROCEDURE — 63710000001 APIXABAN 2.5 MG TABLET: Performed by: ORTHOPAEDIC SURGERY

## 2022-09-27 PROCEDURE — 63710000001 METOPROLOL SUCCINATE XL 50 MG TABLET SUSTAINED-RELEASE 24 HOUR: Performed by: ORTHOPAEDIC SURGERY

## 2022-09-27 PROCEDURE — 63710000001 LEVOTHYROXINE 25 MCG TABLET: Performed by: ORTHOPAEDIC SURGERY

## 2022-09-27 RX ADMIN — LOSARTAN POTASSIUM: 50 TABLET, FILM COATED ORAL at 16:08

## 2022-09-27 RX ADMIN — ONDANSETRON 4 MG: 2 INJECTION INTRAMUSCULAR; INTRAVENOUS at 01:26

## 2022-09-27 RX ADMIN — FAMOTIDINE 40 MG: 40 TABLET, FILM COATED ORAL at 10:01

## 2022-09-27 RX ADMIN — ESCITALOPRAM OXALATE 10 MG: 10 TABLET ORAL at 20:18

## 2022-09-27 RX ADMIN — OXYCODONE 5 MG: 5 TABLET ORAL at 22:42

## 2022-09-27 RX ADMIN — CEFAZOLIN 2 G: 10 INJECTION, POWDER, FOR SOLUTION INTRAVENOUS; PARENTERAL at 03:15

## 2022-09-27 RX ADMIN — METOPROLOL SUCCINATE 100 MG: 50 TABLET, EXTENDED RELEASE ORAL at 16:08

## 2022-09-27 RX ADMIN — AMLODIPINE BESYLATE 5 MG: 5 TABLET ORAL at 16:09

## 2022-09-27 RX ADMIN — OXYCODONE 5 MG: 5 TABLET ORAL at 06:21

## 2022-09-27 RX ADMIN — HYDROMORPHONE HYDROCHLORIDE 0.5 MG: 1 INJECTION, SOLUTION INTRAMUSCULAR; INTRAVENOUS; SUBCUTANEOUS at 03:20

## 2022-09-27 RX ADMIN — OXYCODONE 5 MG: 5 TABLET ORAL at 11:49

## 2022-09-27 RX ADMIN — OXYCODONE 5 MG: 5 TABLET ORAL at 01:21

## 2022-09-27 RX ADMIN — HYDROMORPHONE HYDROCHLORIDE 0.5 MG: 1 INJECTION, SOLUTION INTRAMUSCULAR; INTRAVENOUS; SUBCUTANEOUS at 12:53

## 2022-09-27 RX ADMIN — Medication 10 ML: at 20:18

## 2022-09-27 RX ADMIN — LEVOTHYROXINE SODIUM 137 MCG: 25 TABLET ORAL at 05:16

## 2022-09-27 RX ADMIN — ACETAMINOPHEN 1000 MG: 500 TABLET ORAL at 10:01

## 2022-09-27 RX ADMIN — DOCUSATE SODIUM 50 MG AND SENNOSIDES 8.6 MG 2 TABLET: 8.6; 5 TABLET, FILM COATED ORAL at 20:18

## 2022-09-27 RX ADMIN — Medication 3 ML: at 09:00

## 2022-09-27 RX ADMIN — Medication 3 ML: at 20:19

## 2022-09-27 RX ADMIN — ONDANSETRON HYDROCHLORIDE 4 MG: 4 TABLET, FILM COATED ORAL at 22:42

## 2022-09-27 RX ADMIN — ACETAMINOPHEN 1000 MG: 500 TABLET ORAL at 20:18

## 2022-09-27 RX ADMIN — TRAZODONE HYDROCHLORIDE 150 MG: 150 TABLET ORAL at 20:18

## 2022-09-27 RX ADMIN — FERROUS SULFATE TAB EC 324 MG (65 MG FE EQUIVALENT) 324 MG: 324 (65 FE) TABLET DELAYED RESPONSE at 10:02

## 2022-09-27 RX ADMIN — ACETAMINOPHEN 1000 MG: 500 TABLET ORAL at 18:21

## 2022-09-27 RX ADMIN — APIXABAN 2.5 MG: 2.5 TABLET, FILM COATED ORAL at 10:01

## 2022-09-27 RX ADMIN — APIXABAN 2.5 MG: 2.5 TABLET, FILM COATED ORAL at 20:18

## 2022-09-28 PROCEDURE — 63710000001 ESCITALOPRAM 10 MG TABLET: Performed by: ORTHOPAEDIC SURGERY

## 2022-09-28 PROCEDURE — A9270 NON-COVERED ITEM OR SERVICE: HCPCS | Performed by: ORTHOPAEDIC SURGERY

## 2022-09-28 PROCEDURE — 63710000001 APIXABAN 2.5 MG TABLET: Performed by: ORTHOPAEDIC SURGERY

## 2022-09-28 PROCEDURE — 97116 GAIT TRAINING THERAPY: CPT

## 2022-09-28 PROCEDURE — 63710000001 TRAZODONE 150 MG TABLET: Performed by: ORTHOPAEDIC SURGERY

## 2022-09-28 PROCEDURE — 63710000001 ACETAMINOPHEN 500 MG TABLET: Performed by: ORTHOPAEDIC SURGERY

## 2022-09-28 PROCEDURE — 63710000001 LEVOTHYROXINE 25 MCG TABLET: Performed by: ORTHOPAEDIC SURGERY

## 2022-09-28 PROCEDURE — 63710000001 ONDANSETRON PER 8 MG: Performed by: ORTHOPAEDIC SURGERY

## 2022-09-28 PROCEDURE — 97530 THERAPEUTIC ACTIVITIES: CPT

## 2022-09-28 PROCEDURE — 63710000001 METOPROLOL SUCCINATE XL 50 MG TABLET SUSTAINED-RELEASE 24 HOUR: Performed by: ORTHOPAEDIC SURGERY

## 2022-09-28 PROCEDURE — 63710000001 LEVOTHYROXINE 112 MCG TABLET: Performed by: ORTHOPAEDIC SURGERY

## 2022-09-28 PROCEDURE — 63710000001 SENNOSIDES-DOCUSATE 8.6-50 MG TABLET: Performed by: ORTHOPAEDIC SURGERY

## 2022-09-28 PROCEDURE — 63710000001 AMLODIPINE 5 MG TABLET: Performed by: ORTHOPAEDIC SURGERY

## 2022-09-28 PROCEDURE — 63710000001 FERROUS SULFATE 324 (65 FE) MG TABLET DELAYED-RELEASE: Performed by: ORTHOPAEDIC SURGERY

## 2022-09-28 PROCEDURE — 63710000001 FAMOTIDINE 40 MG TABLET: Performed by: ORTHOPAEDIC SURGERY

## 2022-09-28 PROCEDURE — 63710000001 HYDROCHLOROTHIAZIDE 12.5 MG TABLET 100 EACH BOTTLE: Performed by: ORTHOPAEDIC SURGERY

## 2022-09-28 PROCEDURE — 63710000001 OXYCODONE 5 MG TABLET: Performed by: ORTHOPAEDIC SURGERY

## 2022-09-28 PROCEDURE — 99024 POSTOP FOLLOW-UP VISIT: CPT | Performed by: ORTHOPAEDIC SURGERY

## 2022-09-28 PROCEDURE — 97110 THERAPEUTIC EXERCISES: CPT

## 2022-09-28 PROCEDURE — 63710000001 LOSARTAN 50 MG TABLET 1 EACH BLISTER: Performed by: ORTHOPAEDIC SURGERY

## 2022-09-28 PROCEDURE — 97535 SELF CARE MNGMENT TRAINING: CPT

## 2022-09-28 RX ADMIN — OXYCODONE 5 MG: 5 TABLET ORAL at 04:08

## 2022-09-28 RX ADMIN — LOSARTAN POTASSIUM: 50 TABLET, FILM COATED ORAL at 09:30

## 2022-09-28 RX ADMIN — ESCITALOPRAM OXALATE 10 MG: 10 TABLET ORAL at 20:04

## 2022-09-28 RX ADMIN — DOCUSATE SODIUM 50 MG AND SENNOSIDES 8.6 MG 2 TABLET: 8.6; 5 TABLET, FILM COATED ORAL at 20:04

## 2022-09-28 RX ADMIN — OXYCODONE 5 MG: 5 TABLET ORAL at 20:04

## 2022-09-28 RX ADMIN — LEVOTHYROXINE SODIUM 137 MCG: 25 TABLET ORAL at 05:44

## 2022-09-28 RX ADMIN — FAMOTIDINE 40 MG: 40 TABLET, FILM COATED ORAL at 09:30

## 2022-09-28 RX ADMIN — APIXABAN 2.5 MG: 2.5 TABLET, FILM COATED ORAL at 20:04

## 2022-09-28 RX ADMIN — ACETAMINOPHEN 1000 MG: 500 TABLET ORAL at 20:04

## 2022-09-28 RX ADMIN — AMLODIPINE BESYLATE 5 MG: 5 TABLET ORAL at 09:30

## 2022-09-28 RX ADMIN — TRAZODONE HYDROCHLORIDE 150 MG: 150 TABLET ORAL at 20:04

## 2022-09-28 RX ADMIN — FERROUS SULFATE TAB EC 324 MG (65 MG FE EQUIVALENT) 324 MG: 324 (65 FE) TABLET DELAYED RESPONSE at 09:31

## 2022-09-28 RX ADMIN — METOPROLOL SUCCINATE 100 MG: 50 TABLET, EXTENDED RELEASE ORAL at 09:31

## 2022-09-28 RX ADMIN — ACETAMINOPHEN 1000 MG: 500 TABLET ORAL at 12:36

## 2022-09-28 RX ADMIN — ONDANSETRON HYDROCHLORIDE 4 MG: 4 TABLET, FILM COATED ORAL at 20:04

## 2022-09-28 RX ADMIN — APIXABAN 2.5 MG: 2.5 TABLET, FILM COATED ORAL at 09:31

## 2022-09-28 RX ADMIN — ACETAMINOPHEN 1000 MG: 500 TABLET ORAL at 09:31

## 2022-09-29 ENCOUNTER — HOME HEALTH ADMISSION (OUTPATIENT)
Dept: HOME HEALTH SERVICES | Facility: HOME HEALTHCARE | Age: 72
End: 2022-09-29

## 2022-09-29 ENCOUNTER — APPOINTMENT (OUTPATIENT)
Dept: GENERAL RADIOLOGY | Facility: HOSPITAL | Age: 72
End: 2022-09-29

## 2022-09-29 VITALS
BODY MASS INDEX: 35.31 KG/M2 | TEMPERATURE: 97.4 F | DIASTOLIC BLOOD PRESSURE: 58 MMHG | OXYGEN SATURATION: 92 % | SYSTOLIC BLOOD PRESSURE: 127 MMHG | HEART RATE: 70 BPM | RESPIRATION RATE: 18 BRPM | HEIGHT: 67 IN | WEIGHT: 225 LBS

## 2022-09-29 PROCEDURE — 63710000001 FERROUS SULFATE 324 (65 FE) MG TABLET DELAYED-RELEASE: Performed by: ORTHOPAEDIC SURGERY

## 2022-09-29 PROCEDURE — 99024 POSTOP FOLLOW-UP VISIT: CPT | Performed by: ORTHOPAEDIC SURGERY

## 2022-09-29 PROCEDURE — 63710000001 OXYCODONE 5 MG TABLET: Performed by: ORTHOPAEDIC SURGERY

## 2022-09-29 PROCEDURE — 73502 X-RAY EXAM HIP UNI 2-3 VIEWS: CPT

## 2022-09-29 PROCEDURE — 63710000001 FAMOTIDINE 40 MG TABLET: Performed by: ORTHOPAEDIC SURGERY

## 2022-09-29 PROCEDURE — 63710000001 LEVOTHYROXINE 25 MCG TABLET: Performed by: ORTHOPAEDIC SURGERY

## 2022-09-29 PROCEDURE — A9270 NON-COVERED ITEM OR SERVICE: HCPCS | Performed by: ORTHOPAEDIC SURGERY

## 2022-09-29 PROCEDURE — 63710000001 ACETAMINOPHEN 500 MG TABLET: Performed by: ORTHOPAEDIC SURGERY

## 2022-09-29 PROCEDURE — 63710000001 METOPROLOL SUCCINATE XL 50 MG TABLET SUSTAINED-RELEASE 24 HOUR: Performed by: ORTHOPAEDIC SURGERY

## 2022-09-29 PROCEDURE — 97530 THERAPEUTIC ACTIVITIES: CPT

## 2022-09-29 PROCEDURE — 63710000001 LEVOTHYROXINE 112 MCG TABLET: Performed by: ORTHOPAEDIC SURGERY

## 2022-09-29 PROCEDURE — 63710000001 APIXABAN 2.5 MG TABLET: Performed by: ORTHOPAEDIC SURGERY

## 2022-09-29 PROCEDURE — 63710000001 LOSARTAN 50 MG TABLET 1 EACH BLISTER: Performed by: ORTHOPAEDIC SURGERY

## 2022-09-29 PROCEDURE — 63710000001 AMLODIPINE 5 MG TABLET: Performed by: ORTHOPAEDIC SURGERY

## 2022-09-29 PROCEDURE — 97116 GAIT TRAINING THERAPY: CPT

## 2022-09-29 PROCEDURE — 97110 THERAPEUTIC EXERCISES: CPT

## 2022-09-29 PROCEDURE — 63710000001 HYDROCHLOROTHIAZIDE 12.5 MG TABLET 100 EACH BOTTLE: Performed by: ORTHOPAEDIC SURGERY

## 2022-09-29 RX ORDER — AMOXICILLIN 250 MG
2 CAPSULE ORAL NIGHTLY
Qty: 34 TABLET | Refills: 0 | Status: SHIPPED | OUTPATIENT
Start: 2022-09-29 | End: 2022-10-16

## 2022-09-29 RX ORDER — FERROUS SULFATE TAB EC 324 MG (65 MG FE EQUIVALENT) 324 (65 FE) MG
324 TABLET DELAYED RESPONSE ORAL
Qty: 27 TABLET | Refills: 0 | Status: SHIPPED | OUTPATIENT
Start: 2022-09-30 | End: 2022-10-27

## 2022-09-29 RX ORDER — OXYCODONE AND ACETAMINOPHEN 7.5; 325 MG/1; MG/1
1 TABLET ORAL EVERY 6 HOURS PRN
Qty: 30 TABLET | Refills: 0 | Status: SHIPPED | OUTPATIENT
Start: 2022-09-29 | End: 2022-10-12 | Stop reason: SDUPTHER

## 2022-09-29 RX ADMIN — LEVOTHYROXINE SODIUM 137 MCG: 25 TABLET ORAL at 05:22

## 2022-09-29 RX ADMIN — AMLODIPINE BESYLATE 5 MG: 5 TABLET ORAL at 10:22

## 2022-09-29 RX ADMIN — APIXABAN 2.5 MG: 2.5 TABLET, FILM COATED ORAL at 10:22

## 2022-09-29 RX ADMIN — FAMOTIDINE 40 MG: 40 TABLET, FILM COATED ORAL at 10:21

## 2022-09-29 RX ADMIN — ACETAMINOPHEN 1000 MG: 500 TABLET ORAL at 10:21

## 2022-09-29 RX ADMIN — OXYCODONE 5 MG: 5 TABLET ORAL at 10:21

## 2022-09-29 RX ADMIN — METOPROLOL SUCCINATE 100 MG: 50 TABLET, EXTENDED RELEASE ORAL at 10:22

## 2022-09-29 RX ADMIN — FERROUS SULFATE TAB EC 324 MG (65 MG FE EQUIVALENT) 324 MG: 324 (65 FE) TABLET DELAYED RESPONSE at 10:24

## 2022-09-29 RX ADMIN — LOSARTAN POTASSIUM: 50 TABLET, FILM COATED ORAL at 10:21

## 2022-09-30 ENCOUNTER — TELEPHONE (OUTPATIENT)
Dept: ORTHOPEDIC SURGERY | Facility: CLINIC | Age: 72
End: 2022-09-30

## 2022-09-30 ENCOUNTER — HOME CARE VISIT (OUTPATIENT)
Dept: HOME HEALTH SERVICES | Facility: CLINIC | Age: 72
End: 2022-09-30

## 2022-09-30 PROCEDURE — G0151 HHCP-SERV OF PT,EA 15 MIN: HCPCS

## 2022-09-30 NOTE — TELEPHONE ENCOUNTER
----- Message from Johnny Rutherford MD sent at 9/29/2022  5:03 PM CDT -----  We have had this discussion each morning since surgery.  It could be nerve pain.  It could be muscle pain from the surgery.  The xray today was negative for a fracture - which we also discussed.  We discussed, at length, weightbearing as tolerated and continuing to use a walker for support.  If she continues having the pain after 7 to 10 days then we could start Neurontin but that I did not want to start that right now because it can make some people a little bit wobbly and we did not want her to have any increased risk for falling.  It should get better.  Continue ice and elevation for swelling control.  ----- Message -----  From: Aarti Hassan  Sent: 9/29/2022   4:06 PM CDT  To: MD DR ROS Crocker.  PATIENT HAD A LEFT TOTAL HIP DONE ON Monday, 09/26/2022.  THE DAUGHTER, TANIYA STORY, CALLED, STATING THE PATIENT WANTS TO KNOW WHY HER LEG IS STILL BURNING DOWN HER FEMUR?  [phone]

## 2022-09-30 NOTE — TELEPHONE ENCOUNTER
Spoke with daughter and relayed message, she voiced understanding. She is going to try to make it to 15 days before calling back.

## 2022-10-02 VITALS
DIASTOLIC BLOOD PRESSURE: 58 MMHG | TEMPERATURE: 97.8 F | SYSTOLIC BLOOD PRESSURE: 128 MMHG | OXYGEN SATURATION: 97 % | HEART RATE: 70 BPM | RESPIRATION RATE: 20 BRPM

## 2022-10-02 NOTE — HOME HEALTH
"Patient is a 73 y/o female who was seen for OASIS SOC with physical therapy evaluation on 9/30/22 secondary to elective L MAGO performed by Dr. Rutherford on 9/26/22.    Focus of care: The PT focus of care for this patient will include but but be limited to: L LE strength training to improve functional mobility, preserve muscle tone and increase muscular endurance to help reduce levels of fatigue and improve activity tolerance, gait and transfer training to aid in reduction of fall risk and improve patient safety w/ mobility, balance training to prevent falls over course of care and improve functional balance outcome scores, ROM and manual therapy to improve hip mobility resulting in functional transfers and ambulation w/o joint restrictions, stair navigation to safely enter and exit home and use proper sequencing for safe mobility       PMH includes:       CKD (chronic kidney disease)      Disease of thyroid gland      Diverticulitis      GERD (gastroesophageal reflux disease)      Hiatal hernia      Hyperlipidemia      Hypertension      Stroke (HCC)      TIA (transient ischemic attack)     Patient goals: \"get back to doing everything myself, get leg stronger, get back to driving again\"    PLOF: indep w/ all activities and driving regularly    Upon physical therapy evalaution, patient presents with strength, transfer, balance, endurance, and gait deficits as noted in physical therapy assessment. These deficits are limiting patient's overall functional capacity w/ independence, activity, ADLs, and IADLs. Patient will benefit from skilled physical therapy to address the defcitis noted above and recorded over course of care to allow patient to return to PLOF and max level of independence.    Subjective: Patient reports that she is doing well other than having a burning feeling in L leg when ambulating or moving leg in certain positions. Reporting feeling fine near surgical site but burning is causing most limitation at this " time. Pt explained that this could be residual effect from nerves following surgery and will require more time to heal and should resolve over time. Patient states she is doing well otherwise and was able to get up and make herself some coffee this morning. Staying on her own at this time but has son and daughter that live close by and can assist with whatever she needs when requested. No other issues to report at this time.

## 2022-10-04 ENCOUNTER — HOME CARE VISIT (OUTPATIENT)
Dept: HOME HEALTH SERVICES | Facility: CLINIC | Age: 72
End: 2022-10-04

## 2022-10-04 PROCEDURE — G0157 HHC PT ASSISTANT EA 15: HCPCS

## 2022-10-04 NOTE — CASE COMMUNICATION
"[facility]  / Case Conference Note  Medical Necessity and focus of care:Patient is a 73 y/o female who was seen for OASIS SOC with physical therapy evaluation on 9/30/22 secondary to elective L MAGO performed by Dr. Rutherford on 9/26/22. PT focus of care for this patient will include but but be limited to: L LE strength training to improve functional mobility, preserve muscle tone and increase muscular endurance to help reduce levels of fatigue  and improve activity tolerance, gait and transfer training to aid in reduction of fall risk and improve patient safety w/ mobility, balance training to prevent falls over course of care and improve functional balance outcome scores, ROM and manual therapy to improve hip mobility resulting in functional transfers and ambulation w/o joint restrictions, stair navigation to safely enter and exit home and use proper sequencing for safe mobil ity Caregiver Status:  Patient's goal(s): \"get back to doing everything myself, get leg stronger, get back to driving again\"  GG Discharge Goal: 6  Medication Issus: none noted upon admission; all medication present in home and DIL helping patient manage medications   Environmental/ Physical issues: L MAGO., 7 stiars to enter and exit home w/ hand rails, cluttered living room, narrow bathroom causing difficulty getting device in and out  of bathroom  Any additional needs: none at this time    Based upon record review and collaboration conference, the recommended frequency for this patient is   SN-----  PT-----1w1, 2w3  OT----  ST-----  HHA---  MSW---  Provider___Dr. Rutherford_______ Notified @ ___9/30/22______ and agrees with POC     Please verify your eval  scores: (Answer the scores  that pertain to your area of focus remove the others)    3   2      "

## 2022-10-05 VITALS
TEMPERATURE: 96.6 F | DIASTOLIC BLOOD PRESSURE: 62 MMHG | HEART RATE: 66 BPM | RESPIRATION RATE: 18 BRPM | OXYGEN SATURATION: 95 % | SYSTOLIC BLOOD PRESSURE: 137 MMHG

## 2022-10-05 DIAGNOSIS — M16.12 PRIMARY OSTEOARTHRITIS OF LEFT HIP: Primary | ICD-10-CM

## 2022-10-05 NOTE — DISCHARGE SUMMARY
Patient Name: Marielle Hansen  Patient YOB: 1950    Date of Admission:  9/26/2022  Date of Discharge:  9/29/2022  Discharge Diagnosis:   Primary osteoarthritis of left hip    Essential hypertension    Spinal stenosis of lumbar region without neurogenic claudication    Primary osteoarthritis of right hip    Mild intermittent asthma without complication    Impaired functional mobility, balance, gait, and endurance    Impaired mobility and activities of daily living    Presenting Problem/History of Present Illness: Primary osteoarthritis of left hip [M16.12]  Spinal stenosis of lumbar region without neurogenic claudication [M48.061]  Essential hypertension [I10]  Primary osteoarthritis of right hip [M16.11]  Mild intermittent asthma without complication [J45.20]    Procedure:  Procedure(s) (LRB):  LEFT TOTAL HIP ARTHROPLASTY ANTERIOR (Left)    Admitting Physician:  Johnny Rutherford MD    Consults:   Consults     No orders found from 8/28/2022 to 9/27/2022.          DETAILS OF HOSPITAL STAY:  Patient is a 72 y.o. female was admitted to the floor following the above procedure and underwent an uncomplicated hospital stay.  Patient did well with physical therapy and was ambulating well without problems.  On the day of discharge the wound was clean, dry and intact and calf was soft and non tender and Homans sign was negative.  Patient was tolerating diet without problems.  Patient was discharged home.    Condition on Discharge:  Stable      LABS:      Admission on 09/26/2022, Discharged on 09/29/2022   Component Date Value Ref Range Status   • ABO Type 09/26/2022 O   Final   • RH type 09/26/2022 Positive   Final   • Antibody Screen 09/26/2022 Negative   Final   • T&S Expiration Date 09/26/2022 9/29/2022 11:59:59 PM   Final   • Previous History 09/26/2022 Previous Record on File   Final   • Reference Lab Report 09/26/2022    Final                    Value:[facility]  [address] [place] [address]  Phone: [phone] or [phone]  Fax: [phone]  Ronnell Santiago M.D., Medical Director    PATIENT NAME                           LABORATORY NO.  HARRY ALEXIS.                [id number]  [id number]                         AGE              SEX  SSN           [facility]  [facility]           72      1950  F    [id number]   [id number]    Henrietta                       REQUESTING M.D.     ATTENDING MKIANA     COPY TO[address]                 DERRELL SOMMER KRISTY  [place] [id number]             DATE COLLECTED      DATE RECEIVED      DATE REPORTED  2022    DIAGNOSIS:  FEMORAL HEAD, GROSS ONLY, LEFT:  GROSS DIAGNOSIS:  Consistent with osteoarthritis    BLAKE/sm    CLINICAL HISTORY:  Primary osteoarthritis of left hip, spinal stenosis of lumbar region without  neurogenic claudication,                           essential hypertension, primary osteoarthritis of right  hip, mild intermittent asthma without complication    SPECIMENS RECEIVED:  FEMORAL HEAD, GROSS ONLY, LEFT    Professional interpretation rendered by Vineet Gutierrez M.D., F.C.A.P. at [facility], [address] [place] [address], [place] [address].    GROSS DESCRIPTION:  Received in formalin labeled femoral head left hip is a 4.7 x 4.5 x 4 cm femoral  head with an attached 4 x 2.7 x 1 cm portion of femoral neck.  Also received  freely floating in the container is a 2 cm aggregate of bone fragments.  The  articular surface is diffusely eroded and eburnated.  The cut surface is firm,  brown and trabecular.  Moderate osteophyte formation is present.  No gross  lesions or areas of softening are identified.  No sections are submitted.  Gross  diagnosis only.  LDP    REVIEWED, DIAGNOSED AND ELECTRONICALLY  SIGNED BY:    Vineet Gutierrez M.D., F.C.A.P.  CPT CODES:  69869     • Hemoglobin 2022 11.6 (A) 12.0 - 15.9  g/dL Final   • Hematocrit 09/26/2022 35.2  34.0 - 46.6 % Final       Peripheral Block    Result Date: 9/26/2022  Narrative: Devon Roach MD     9/26/2022  9:05 AM Peripheral Block Patient reassessed immediately prior to procedure Patient location during procedure: holding area Stop time: 9/26/2022 8:56 AM Reason for block: at surgeon's request and post-op pain management Performed by Anesthesiologist: Devon Roach MD Assisted by: Tasneem Garcia SRNA Preanesthetic Checklist Completed: patient identified, IV checked, site marked, risks and benefits discussed, surgical consent, monitors and equipment checked, pre-op evaluation and timeout performed Prep: Pt Position: supine Sterile barriers:cap, gloves, mask and washed/disinfected hands Prep: ChloraPrep Patient monitoring: blood pressure monitoring and continuous pulse oximetry Procedure Sedation: no Performed under: local infiltration Guidance:ultrasound guided ULTRASOUND INTERPRETATION.  Using ultrasound guidance a 21 G gauge needle was placed in close proximity to the femoral nerve, at which point, under ultrasound guidance anesthetic was injected in the area of the nerve and spread of the anesthesia was seen on ultrasound in close proximity thereto.  There were no abnormalities seen on ultrasound; a digital image was taken; and the patient tolerated the procedure with no complications. Images:still images obtained, printed/placed on chart Laterality:left Block Type:fascia iliaca compartment Injection Technique:single-shot Needle Type:echogenic Needle Gauge:21 G Medications Used: ropivacaine (NAROPIN) 0.5 % injection, 30 mL Med administered at 9/26/2022 8:55 AM Medications Comment:Pt ID'd Ultrasound guided Needle seen throughout Local infiltration appropriate Post Assessment Injection Assessment: negative aspiration for heme, no paresthesia on injection and incremental injection Patient Tolerance:comfortable throughout block Complications:no     XR  Hip With or Without Pelvis 2 - 3 View Left    Result Date: 9/29/2022  Narrative: Procedure:  Left hip    Indication:  Left hip implant   . Technique:  2 views   . Prior relevant exam: Hips January 11, 2022. Interval placement of a left total hip arthroplasty Acetabular and femoral components of the prosthesis demonstrate a radiographically satisfactory appearance.     Impression: As above. Electronically signed by:  Neno Maria MD  9/29/2022 8:24 AM CDT Workstation: [id number]      Discharge Medications     Discharge Medications      New Medications      Instructions Start Date   Eliquis 2.5 MG tablet tablet  Generic drug: apixaban   Take 1 tablet by mouth Every 12 (Twelve) Hours for 25 doses.      ferrous sulfate 324 (65 Fe) MG tablet delayed-release EC tablet   324 mg, Oral, Daily With Breakfast      oxyCODONE-acetaminophen 7.5-325 MG per tablet  Commonly known as: PERCOCET   1 tablet, Oral, Every 6 Hours PRN      Stool Softener Plus Laxative 8.6-50 MG per tablet  Generic drug: sennosides-docusate   2 tablets, Oral, Nightly         Continue These Medications      Instructions Start Date   albuterol sulfate  (90 Base) MCG/ACT inhaler  Commonly known as: PROVENTIL HFA;VENTOLIN HFA;PROAIR HFA   2 puffs, Inhalation, Every 4 Hours PRN      amLODIPine 5 MG tablet  Commonly known as: NORVASC   5 mg, Oral, Daily      cyanocobalamin 1000 MCG/ML injection   1,000 mcg, Intramuscular, Every 30 Days      Drysol 20 % external solution  Generic drug: aluminum chloride   1 each, Topical, As Needed      escitalopram 10 MG tablet  Commonly known as: LEXAPRO   10 mg, Oral, Nightly      levothyroxine 137 MCG tablet  Commonly known as: SYNTHROID, LEVOTHROID   137 mcg, Oral, Daily      lovastatin 40 MG tablet  Commonly known as: MEVACOR   40 mg, Oral, Nightly      metoprolol succinate  MG 24 hr tablet  Commonly known as: TOPROL-XL   100 mg, Oral, Daily      olmesartan-hydrochlorothiazide 40-12.5 MG per tablet  Commonly known  as: BENICAR HCT   1 tablet, Oral, Daily      ondansetron ODT 4 MG disintegrating tablet  Commonly known as: Zofran ODT   4 mg, Translingual, Every 8 Hours PRN      traZODone 150 MG tablet  Commonly known as: DESYREL   150 mg, Oral, Nightly      Vitamin D3 1.25 MG (80467 UT) capsule   50,000 Units, Oral, 2 Times Weekly         Stop These Medications    HYDROcodone-acetaminophen 7.5-325 MG per tablet  Commonly known as: Norco            Discharge Diet:   Diet Instructions     Diet: Regular      Discharge Diet: Regular          Activity at Discharge:   Activity Instructions     Activity as Tolerated            Discharge Instructions: Patient is to continue with physical therapy exercises daily and continue working with the physical therapist as ordered. Patient may weight bear as tolerated. Continue ice regularly and use ace bandages from the foot toward the knee as needed for swelling. Patient instructed on frequent calf pumping exercises.  Patient also instructed on incentive spirometer during hospitalization and encouraged to continue to use at home regularly. Patient may shower on POD#2. The wound should be gently cleaned with antibacterial soap then allowed to dry.  If there is any drainage then it can be covered with a dry sterile dressing.  If there is drainage, redness or swelling, then the physician should be notified. Follow up appointment in 2 weeks - patient to call the office at [phone] to schedule.  Eliquis for 2 weeks followed by aspirin 81mg BID for 30 days.  All other meds per the discharge med/rec    Discharge Diagnosis:    Patient Active Problem List   Diagnosis   • Essential hypertension   • Hyperlipidemia   • Hypothyroidism   • Right hip pain   • Current moderate episode of major depressive disorder without prior episode (HCC)   • Vitamin D deficiency   • Vitamin B12 deficiency   • Spinal stenosis of lumbar region   • Low back pain   • Primary osteoarthritis of right hip   • Obesity (BMI  30.0-34.9)   • Epigastric pain   • Nausea and vomiting   • Diarrhea   • Rectal bleeding   • Bloating   • Insomnia   • Calculus of kidney   • CARMEN (acute kidney injury) (HCC)   • Ureteral stone with hydronephrosis   • Carpal tunnel syndrome   • Left hip pain   • Change in bowel habit   • Mucus in stool   • Personal history of colonic polyps   • Generalized abdominal pain   • Primary osteoarthritis of left hip   • Mild intermittent asthma without complication       Follow-up Appointments  Future Appointments   Date Time Provider Department Center   10/7/2022 To Be Determined Walker Eligio, PTA [facility]   10/11/2022 To Be Determined Walker, Eligio, PTA [facility]   10/12/2022  3:20 PM Hortencia Correa APRN [facility]   10/14/2022 To Be Determined Walker, Eligio, PTA [facility]   10/18/2022 To Be Determined Walker, Eligio, PTA [facility]   10/21/2022 To Be Determined Walker Eligio, PTA [facility]   10/24/2022 10:30 AM Yoselin Rubio MD [facility]   10/26/2022 11:00 AM Perla Carty APRN [facility]     Additional Instructions for the Follow-ups that You Need to Schedule     Ambulatory Referral to Home Health   As directed      S/p MAGO  No true restrictions  Swelling control  Slowly progress as tolerated  Assistive device as needed.    Order Comments: S/p MAGO No true restrictions Swelling control Slowly progress as tolerated Assistive device as needed.     Face to Face Visit Date: 9/29/2022    Follow-up provider for Plan of Care?: I will be treating the patient on an ongoing basis.  Please send me the Plan of Care for signature.    Follow-up provider: DERRELL SOMMER [[id number]]    Reason/Clinical Findings: total hip arthroplasty    Describe mobility limitations that make leaving home difficult: limited mobility and limited transportation    Nursing/Therapeutic Services Requested: Physical Therapy    Frequency: 1 Week 1         Call MD With Problems / Concerns   As directed       Instructions:   Call the office with questions, problems, or concerns.  Be sure to call before taking last pain pill if a refill will be needed.    Order Comments: Instructions: Call the office with questions, problems, or concerns. Be sure to call before taking last pain pill if a refill will be needed.          Discharge Follow-up with Specified Provider: POLINA Winter T Young/Sherrell; 2 Weeks   As directed      To: POLINA Winter T Young/Burns    Follow Up: 2 Weeks                  Johnny Rutherford MD  10/05/22  10:13 CDT

## 2022-10-06 ENCOUNTER — HOME CARE VISIT (OUTPATIENT)
Dept: HOME HEALTH SERVICES | Facility: CLINIC | Age: 72
End: 2022-10-06

## 2022-10-06 PROCEDURE — G0157 HHC PT ASSISTANT EA 15: HCPCS

## 2022-10-07 VITALS
DIASTOLIC BLOOD PRESSURE: 62 MMHG | TEMPERATURE: 97.9 F | HEART RATE: 66 BPM | OXYGEN SATURATION: 95 % | SYSTOLIC BLOOD PRESSURE: 140 MMHG | RESPIRATION RATE: 18 BRPM

## 2022-10-09 ENCOUNTER — HOME CARE VISIT (OUTPATIENT)
Dept: HOME HEALTH SERVICES | Facility: CLINIC | Age: 72
End: 2022-10-09

## 2022-10-09 VITALS
HEART RATE: 56 BPM | SYSTOLIC BLOOD PRESSURE: 120 MMHG | RESPIRATION RATE: 18 BRPM | TEMPERATURE: 97.6 F | OXYGEN SATURATION: 96 % | DIASTOLIC BLOOD PRESSURE: 60 MMHG

## 2022-10-09 PROCEDURE — G0157 HHC PT ASSISTANT EA 15: HCPCS

## 2022-10-11 PROCEDURE — G0180 MD CERTIFICATION HHA PATIENT: HCPCS | Performed by: ORTHOPAEDIC SURGERY

## 2022-10-12 ENCOUNTER — OFFICE VISIT (OUTPATIENT)
Dept: ORTHOPEDIC SURGERY | Facility: CLINIC | Age: 72
End: 2022-10-12

## 2022-10-12 VITALS — BODY MASS INDEX: 32.65 KG/M2 | HEIGHT: 67 IN | WEIGHT: 208 LBS

## 2022-10-12 DIAGNOSIS — J45.20 MILD INTERMITTENT ASTHMA WITHOUT COMPLICATION: ICD-10-CM

## 2022-10-12 DIAGNOSIS — Z74.09 IMPAIRED FUNCTIONAL MOBILITY, BALANCE, GAIT, AND ENDURANCE: ICD-10-CM

## 2022-10-12 DIAGNOSIS — M16.11 PRIMARY OSTEOARTHRITIS OF RIGHT HIP: ICD-10-CM

## 2022-10-12 DIAGNOSIS — Z74.09 IMPAIRED MOBILITY AND ACTIVITIES OF DAILY LIVING: ICD-10-CM

## 2022-10-12 DIAGNOSIS — Z78.9 IMPAIRED MOBILITY AND ACTIVITIES OF DAILY LIVING: ICD-10-CM

## 2022-10-12 DIAGNOSIS — M16.12 PRIMARY OSTEOARTHRITIS OF LEFT HIP: ICD-10-CM

## 2022-10-12 DIAGNOSIS — I10 ESSENTIAL HYPERTENSION: ICD-10-CM

## 2022-10-12 DIAGNOSIS — Z96.642 STATUS POST TOTAL HIP REPLACEMENT, LEFT: Primary | ICD-10-CM

## 2022-10-12 PROCEDURE — 99024 POSTOP FOLLOW-UP VISIT: CPT | Performed by: NURSE PRACTITIONER

## 2022-10-12 RX ORDER — OXYCODONE AND ACETAMINOPHEN 7.5; 325 MG/1; MG/1
1 TABLET ORAL EVERY 8 HOURS PRN
Qty: 30 TABLET | Refills: 0 | Status: SHIPPED | OUTPATIENT
Start: 2022-10-12 | End: 2022-10-13 | Stop reason: SDUPTHER

## 2022-10-12 NOTE — TELEPHONE ENCOUNTER
Case Time:  Procedures:  Surgeons:    09/26/22 1110 LEFT TOTAL HIP ARTHROPLASTY ANTERIOR         In office today for follow-up.

## 2022-10-12 NOTE — PROGRESS NOTES
Marielle Hansen is a 72 y.o. female is s/p       Chief Complaint   Patient presents with   • Left Hip - Post-op Follow-up       HISTORY OF PRESENT ILLNESS: This 72-year-old female patient presents today for a 2-week 1 day follow-up status post left total hip arthroplasty anterior approach.  This patient has no unusual complaints and is progressing well.  Patient reports some mild swelling and pain to her hip radiating down her anterior thigh.  The patient reports she is participating in home health PT.  Patient reports she is still using a walker for ambulation.  The patient reports her range of motion and strength are improving however, she has decreased strength and limited extension of the hip.  Patient reports she has completed her Eliquis, still taking her ferrous sulfate and stool softener.  The patient reports she had to add an adjunct medication for constipation.  Patient reports bowel movement yesterday.  Patient reports her pain is 8 out of 10 today but due to increased activity, riding in the car and sitting in the chair today.  Patient denies signs and symptoms of infection.  Patient denies signs and symptoms of a DVT.  Denies needing a refill for her oxycodone.  Denies numbness and tingling.  Sent for x-ray of her left hip and pelvis upon arrival.       Allergies   Allergen Reactions   • Codeine Hives   • Famotidine GI Intolerance         Current Outpatient Medications:   •  albuterol sulfate  (90 Base) MCG/ACT inhaler, Inhale 2 puffs Every 4 (Four) Hours As Needed for Wheezing., Disp: 18 g, Rfl: 5  •  amLODIPine (NORVASC) 5 MG tablet, Take 1 tablet by mouth Daily., Disp: 90 tablet, Rfl: 2  •  Cholecalciferol (Vitamin D3) 1.25 MG (02681 UT) capsule, Take 1 capsule by mouth 2 (Two) Times a Week., Disp: 8 capsule, Rfl: 5  •  cyanocobalamin 1000 MCG/ML injection, Inject 1 mL into the appropriate muscle as directed by prescriber Every 30 (Thirty) Days., Disp: 1 mL, Rfl: 5  •  DRYSOL 20 %  external solution, Apply 1 each topically to the appropriate area as directed As Needed., Disp: , Rfl:   •  escitalopram (LEXAPRO) 10 MG tablet, TAKE 1 TABLET BY MOUTH EVERY NIGHT AT BEDTIME per pt, Disp: 90 tablet, Rfl: 0  •  ferrous sulfate 324 (65 Fe) MG tablet delayed-release EC tablet, Take 1 tablet by mouth Daily With Breakfast for 27 doses., Disp: 27 tablet, Rfl: 0  •  levothyroxine (SYNTHROID, LEVOTHROID) 137 MCG tablet, Take 1 tablet by mouth Daily., Disp: 90 tablet, Rfl: 2  •  lovastatin (MEVACOR) 40 MG tablet, Take 40 mg by mouth Every Night., Disp: , Rfl:   •  metoprolol succinate XL (TOPROL-XL) 100 MG 24 hr tablet, Take 1 tablet by mouth Daily., Disp: 90 tablet, Rfl: 2  •  olmesartan-hydrochlorothiazide (BENICAR HCT) 40-12.5 MG per tablet, TAKE 1 TABLET BY MOUTH EVERY DAY, Disp: 90 tablet, Rfl: 0  •  ondansetron ODT (Zofran ODT) 4 MG disintegrating tablet, Place 1 tablet on the tongue Every 8 (Eight) Hours As Needed for Nausea or Vomiting., Disp: 30 tablet, Rfl: 5  •  oxyCODONE-acetaminophen (PERCOCET) 7.5-325 MG per tablet, Take 1 tablet by mouth Every 8 (Eight) Hours As Needed for Severe Pain., Disp: 30 tablet, Rfl: 0  •  traZODone (DESYREL) 150 MG tablet, Take 150 mg by mouth Every Night., Disp: , Rfl:     No fevers or chills.  No nausea or vomiting.      PHYSICAL EXAMINATION:       Marielle Hansen is a 72 y.o. female    Patient is awake and alert, answers questions appropriately and is in no apparent distress.    GAIT:     []  Normal  [x]  Antalgic    Assistive device: []  None  [x]  Walker     []  Crutches  []  Cane     []  Wheelchair  []  Stretcher    Left Hip Exam     Tests   JULIUS: negative  Fadir:  Negative FADIR test    Other   Erythema: absent  Scars: present  Sensation: normal  Pulse: present    Comments:  Mild tenderness over the anterior lateral aspect of the hip and radiating to the anterior thigh.  Surgical incision healing is affected.  Surrounding skin warm, dry and intact.  No  erythema, streaking or increased warmth.  The surgical mesh is no longer intact.  Patient has some limitations with leg lift and decreased strength.  Neurovascular intact.  Mild edema to the lower extremity.  Calf nontender with a negative Homans' sign.                Peripheral Block    Result Date: 9/26/2022  Narrative: Devon Roach MD     9/26/2022  9:05 AM Peripheral Block Patient reassessed immediately prior to procedure Patient location during procedure: holding area Stop time: 9/26/2022 8:56 AM Reason for block: at surgeon's request and post-op pain management Performed by Anesthesiologist: Devon Roach MD Assisted by: Tasneem Garcia SRNA Preanesthetic Checklist Completed: patient identified, IV checked, site marked, risks and benefits discussed, surgical consent, monitors and equipment checked, pre-op evaluation and timeout performed Prep: Pt Position: supine Sterile barriers:cap, gloves, mask and washed/disinfected hands Prep: ChloraPrep Patient monitoring: blood pressure monitoring and continuous pulse oximetry Procedure Sedation: no Performed under: local infiltration Guidance:ultrasound guided ULTRASOUND INTERPRETATION.  Using ultrasound guidance a 21 G gauge needle was placed in close proximity to the femoral nerve, at which point, under ultrasound guidance anesthetic was injected in the area of the nerve and spread of the anesthesia was seen on ultrasound in close proximity thereto.  There were no abnormalities seen on ultrasound; a digital image was taken; and the patient tolerated the procedure with no complications. Images:still images obtained, printed/placed on chart Laterality:left Block Type:fascia iliaca compartment Injection Technique:single-shot Needle Type:echogenic Needle Gauge:21 G Medications Used: ropivacaine (NAROPIN) 0.5 % injection, 30 mL Med administered at 9/26/2022 8:55 AM Medications Comment:Pt ID'd Ultrasound guided Needle seen throughout Local infiltration  appropriate Post Assessment Injection Assessment: negative aspiration for heme, no paresthesia on injection and incremental injection Patient Tolerance:comfortable throughout block Complications:no     XR Hip With or Without Pelvis 2 - 3 View Left    Result Date: 10/14/2022  Narrative: Two view left hip with single view pelvis. HISTORY: Pain. Follow-up arthroplasty. AP and frog-leg lateral views of the left hip and AP film of the pelvis obtained. COMPARISON: September 29, 2022. FINDINGS: No fracture or dislocation. Stable left total hip prosthesis. Degenerative changes right hip with small osteophytes and narrowing of the joint space superiorly and laterally. Minimal degenerative change each iliac crest. Degenerative changes lumbar spine with degenerative disc disease L4-5. No other osseous or articular abnormality. Pelvic phleboliths.     Impression: CONCLUSION: Stable left total hip prosthesis. Degenerative changes right hip with small osteophytes and narrowing of the joint space superiorly and laterally. [id number] Electronically signed by:  Holden Rodriguez MD  10/14/2022 6:35 PM CDT Workstation: [id number]    XR Hip With or Without Pelvis 2 - 3 View Left    Result Date: 9/29/2022  Narrative: Procedure:  Left hip    Indication:  Left hip implant   . Technique:  2 views   . Prior relevant exam: Hips January 11, 2022. Interval placement of a left total hip arthroplasty Acetabular and femoral components of the prosthesis demonstrate a radiographically satisfactory appearance.     Impression: As above. Electronically signed by:  Neno Maria MD  9/29/2022 8:24 AM CDT Workstation: [id number]          ASSESSMENT:    Diagnoses and all orders for this visit:    Status post total hip replacement, left    Primary osteoarthritis of left hip          PLAN  Sent for x-ray of the left hip upon arrival.  Reviewed and discussed with patient.  No sign of lucency noted.  The prosthesis appears well-seated with acceptable anatomical alignment.   Mild soft tissue swelling noted.  No acute bony abnormalities noted.  Recommended the patient start aspirin 81 mg x 2 weeks.  Reviewed signs and symptoms of a DVT and infection.  Advised patient if she has any symptoms we discussed, she should call the office or go to the ER.  Surgical incision care education provided.  Educated to wash with soap and water, rinse and dry well.  Discussed with patient to avoid wearing undergarments that are tight on her incision.  Advised to continue ferrous sulfate until gone.  Continue using the stool softener as well as an adjunct for constipation as needed such as MiraLAX, Metamucil or the Fleet suppositories.  Advised patient to continue using an ambulatory device as needed.  May progress to a cane as tolerated.  Advised patient she should not transition if she still feels weak or as concerned about falling.  Recommended patient to continue physical therapy with home health.  If discontinued, she should start outpatient therapy.  Patient is concerned about having transportation to therapy and hopes to continue home health PT.  Patient seems to be progressing slow with strength and conditioning.  Discussed with patient she has no real restrictions however controlling her swelling and increasing her strength are her priorities.  Discussed hip precautions such as pivoting, hyperextension or external rotation should be avoided.  Demonstrated maneuvers to use at home instead of bending, squatting or pivoting.  Advised patient she may call for oxycodone refill as needed.  Follow-up in 4 weeks or sooner as needed if symptoms change or worsen.  Discussed with patient she may follow-up with Dr. Rutherford for her 6-week postop follow-up.    All questions and concerns are addressed with understanding noted. They are aware and are in agreement to this plan.    Return in about 4 weeks (around 11/9/2022).    BBII Garrett      This document has been electronically signed by Hortencia MICHEL  BIBI Correa on October 17, 2022 12:32 CDT      EMR Dragon/Transciption Disclaimer: Some of this note may be an electronic transcription/translation of spoken language to printed text using the Dragon Dictation System.

## 2022-10-13 ENCOUNTER — HOME CARE VISIT (OUTPATIENT)
Dept: HOME HEALTH SERVICES | Facility: CLINIC | Age: 72
End: 2022-10-13

## 2022-10-13 DIAGNOSIS — J45.20 MILD INTERMITTENT ASTHMA WITHOUT COMPLICATION: ICD-10-CM

## 2022-10-13 DIAGNOSIS — Z74.09 IMPAIRED MOBILITY AND ACTIVITIES OF DAILY LIVING: ICD-10-CM

## 2022-10-13 DIAGNOSIS — I10 ESSENTIAL HYPERTENSION: ICD-10-CM

## 2022-10-13 DIAGNOSIS — Z74.09 IMPAIRED FUNCTIONAL MOBILITY, BALANCE, GAIT, AND ENDURANCE: ICD-10-CM

## 2022-10-13 DIAGNOSIS — Z78.9 IMPAIRED MOBILITY AND ACTIVITIES OF DAILY LIVING: ICD-10-CM

## 2022-10-13 DIAGNOSIS — M16.12 PRIMARY OSTEOARTHRITIS OF LEFT HIP: ICD-10-CM

## 2022-10-13 DIAGNOSIS — M16.11 PRIMARY OSTEOARTHRITIS OF RIGHT HIP: ICD-10-CM

## 2022-10-13 PROCEDURE — G0157 HHC PT ASSISTANT EA 15: HCPCS

## 2022-10-13 RX ORDER — OXYCODONE AND ACETAMINOPHEN 7.5; 325 MG/1; MG/1
1 TABLET ORAL EVERY 8 HOURS PRN
Qty: 30 TABLET | Refills: 0 | Status: SHIPPED | OUTPATIENT
Start: 2022-10-13 | End: 2022-11-10

## 2022-10-13 RX ORDER — OXYCODONE AND ACETAMINOPHEN 7.5; 325 MG/1; MG/1
1 TABLET ORAL EVERY 8 HOURS PRN
Qty: 30 TABLET | Refills: 0 | Status: SHIPPED | OUTPATIENT
Start: 2022-10-13 | End: 2022-10-13 | Stop reason: SDUPTHER

## 2022-10-13 NOTE — TELEPHONE ENCOUNTER
ROS,    Patient called stating pharmacy did not receive Oxycodone RX. It was sent to their long term side and they can not fill those prescriptions or switch it to the retail side of pharmacy. The pharmacy needs the order for the Oxycodone faxed to [facility] in [place] at [phone]. If need to call and place order [phone].

## 2022-10-13 NOTE — TELEPHONE ENCOUNTER
ROS,    Patients Oxycodone was called into the wrong pharmacy. It needs to be called into [facility] in [place], [place].

## 2022-10-16 VITALS
HEART RATE: 57 BPM | DIASTOLIC BLOOD PRESSURE: 80 MMHG | TEMPERATURE: 97.6 F | RESPIRATION RATE: 18 BRPM | SYSTOLIC BLOOD PRESSURE: 150 MMHG | OXYGEN SATURATION: 97 %

## 2022-10-16 NOTE — HOME HEALTH
Patient states I over did it yesterday going to the doctor and walking to much. I even tried walking without my walker and now I'm hurting more today.

## 2022-10-17 DIAGNOSIS — E55.9 VITAMIN D DEFICIENCY: ICD-10-CM

## 2022-10-17 RX ORDER — TRAZODONE HYDROCHLORIDE 150 MG/1
TABLET ORAL
Qty: 60 TABLET | Refills: 5 | Status: SHIPPED | OUTPATIENT
Start: 2022-10-17

## 2022-10-17 RX ORDER — METHOCARBAMOL 750 MG/1
TABLET ORAL
Qty: 8 CAPSULE | Refills: 5 | Status: SHIPPED | OUTPATIENT
Start: 2022-10-17

## 2022-10-17 RX ORDER — OLMESARTAN MEDOXOMIL AND HYDROCHLOROTHIAZIDE 40/12.5 40; 12.5 MG/1; MG/1
TABLET ORAL
Qty: 90 TABLET | Refills: 0 | Status: SHIPPED | OUTPATIENT
Start: 2022-10-17 | End: 2023-01-13

## 2022-10-17 RX ORDER — ESCITALOPRAM OXALATE 10 MG/1
TABLET ORAL
Qty: 90 TABLET | Refills: 0 | Status: SHIPPED | OUTPATIENT
Start: 2022-10-17 | End: 2023-01-13

## 2022-10-18 ENCOUNTER — HOME CARE VISIT (OUTPATIENT)
Dept: HOME HEALTH SERVICES | Facility: CLINIC | Age: 72
End: 2022-10-18

## 2022-10-18 PROCEDURE — G0157 HHC PT ASSISTANT EA 15: HCPCS

## 2022-10-19 ENCOUNTER — HOME CARE VISIT (OUTPATIENT)
Dept: HOME HEALTH SERVICES | Facility: HOME HEALTHCARE | Age: 72
End: 2022-10-19

## 2022-10-19 ENCOUNTER — HOME CARE VISIT (OUTPATIENT)
Dept: HOME HEALTH SERVICES | Facility: CLINIC | Age: 72
End: 2022-10-19

## 2022-10-19 VITALS
TEMPERATURE: 97.4 F | RESPIRATION RATE: 18 BRPM | HEART RATE: 52 BPM | SYSTOLIC BLOOD PRESSURE: 120 MMHG | OXYGEN SATURATION: 96 % | DIASTOLIC BLOOD PRESSURE: 64 MMHG

## 2022-10-19 PROCEDURE — G0151 HHCP-SERV OF PT,EA 15 MIN: HCPCS

## 2022-10-19 NOTE — CASE COMMUNICATION
Patient is progressing towards all goals achieved.  Patient is still having left thigh pain during weight bearing but feels safe and able to manage independently without concern.  Patient would benefit from 1x visit to finalize and ensure independence with HEP and MMT. MCNC signed.

## 2022-10-19 NOTE — HOME HEALTH
Patient states she still hurts with walking and weight bearing in left thigh and didn't even do much over the weekend.

## 2022-10-20 VITALS
HEART RATE: 58 BPM | RESPIRATION RATE: 20 BRPM | SYSTOLIC BLOOD PRESSURE: 134 MMHG | OXYGEN SATURATION: 97 % | TEMPERATURE: 98.1 F | DIASTOLIC BLOOD PRESSURE: 78 MMHG

## 2022-10-20 NOTE — HOME HEALTH
"Cooper reports that she is not doing well today. States that after getting out last Wednesday for her follow up appointment, she was very tired when she got home and had some increased soreness in L hips. States that she was feeling better agian on Thursday morning but midday reported ambulating to bathroom and felt sharp, burning pain in L anterior and lateral thigh. Reports that this caused her to need to sit down on ottoman in living room and had very difficult time getting back up from ottoman but was able to complete and returned back to her living room chair. States that she was unable to lift L LE or complete any of her exercises for next two days w/o pain and weakness. Patient reports that she has gotten a little better each day since this incident b ut still unable to place full weight on L LE w/o feeling that leg is going to \"give out\". States that she has been walking al ot less and needing more support in standing. States that she walks on her toes when ambulating at this time and has not gotten back to her previous level of function since this incident. NO reports of fall or specific incident that led to her curret status. Will continue to monitor and ensure hip continues to progress back towards FWB and pain reduction. No other issues reported this session."

## 2022-10-20 NOTE — CASE COMMUNICATION
"Patient reported these concerns during PT reassessment visit on 10/19/22:    Patient reports that she is not doing well today. States that after getting out last Wednesday for her follow up appointment, she was very tired when she got home and had some increased soreness in L hips. States that she was feeling better agian on Thursday morning but midday reported ambulating to bathroom and felt sharp, burning pain in L anterior and latera l thigh. Reports that this caused her to need to sit down on ottoman in living room and had very difficult time getting back up from ottoman but was able to complete and returned back to her living room chair. States that she was unable to lift L LE or complete any of her exercises for next two days w/o pain and weakness. Patient reports that she has gotten a little better each day since this incident but still unable to place full weig ht on L LE w/o feeling that leg is going to \"give out and significant burning and pain\". States that she has been walking al ot less and needing more support in standing. States that she walks on her toes when ambulating at this time and has not gotten back to her previous level of function since this incident. NO reports of fall or specific incident that led to her curret status other than stating that she \"might have overdone it with  her daily chores\" Patient only reports these issues with WB. Will continue to monitor and ensure hip continues to progress back towards FWB and pain reduction. No other issues reported this session.    Please advise on communication to patient if you have concerns about regression. PT will continue to work with patient in home care setting at this time.     Thank you,    Aman Luna, PT, DPT"

## 2022-10-25 ENCOUNTER — HOME CARE VISIT (OUTPATIENT)
Dept: HOME HEALTH SERVICES | Facility: CLINIC | Age: 72
End: 2022-10-25

## 2022-10-25 PROCEDURE — G0157 HHC PT ASSISTANT EA 15: HCPCS

## 2022-10-27 VITALS
OXYGEN SATURATION: 95 % | TEMPERATURE: 96.8 F | DIASTOLIC BLOOD PRESSURE: 70 MMHG | HEART RATE: 57 BPM | SYSTOLIC BLOOD PRESSURE: 140 MMHG | RESPIRATION RATE: 18 BRPM

## 2022-10-28 ENCOUNTER — HOME CARE VISIT (OUTPATIENT)
Dept: HOME HEALTH SERVICES | Facility: CLINIC | Age: 72
End: 2022-10-28

## 2022-10-28 PROCEDURE — G0157 HHC PT ASSISTANT EA 15: HCPCS

## 2022-10-31 ENCOUNTER — OFFICE VISIT (OUTPATIENT)
Dept: ORTHOPEDIC SURGERY | Facility: CLINIC | Age: 72
End: 2022-10-31

## 2022-10-31 VITALS — HEIGHT: 67 IN | WEIGHT: 210.9 LBS | BODY MASS INDEX: 33.1 KG/M2

## 2022-10-31 VITALS
OXYGEN SATURATION: 95 % | DIASTOLIC BLOOD PRESSURE: 77 MMHG | TEMPERATURE: 97.9 F | SYSTOLIC BLOOD PRESSURE: 150 MMHG | RESPIRATION RATE: 18 BRPM | HEART RATE: 55 BPM

## 2022-10-31 DIAGNOSIS — Z96.652 HISTORY OF TOTAL LEFT KNEE REPLACEMENT: ICD-10-CM

## 2022-10-31 DIAGNOSIS — M25.562 LEFT KNEE PAIN, UNSPECIFIED CHRONICITY: Primary | ICD-10-CM

## 2022-10-31 DIAGNOSIS — M25.562 ACUTE PAIN OF LEFT KNEE: Primary | ICD-10-CM

## 2022-10-31 PROCEDURE — 99214 OFFICE O/P EST MOD 30 MIN: CPT | Performed by: NURSE PRACTITIONER

## 2022-10-31 PROCEDURE — 96372 THER/PROPH/DIAG INJ SC/IM: CPT | Performed by: NURSE PRACTITIONER

## 2022-10-31 RX ORDER — TRIAMCINOLONE ACETONIDE 40 MG/ML
80 INJECTION, SUSPENSION INTRA-ARTICULAR; INTRAMUSCULAR ONCE
Status: COMPLETED | OUTPATIENT
Start: 2022-10-31 | End: 2022-10-31

## 2022-10-31 RX ADMIN — TRIAMCINOLONE ACETONIDE 80 MG: 40 INJECTION, SUSPENSION INTRA-ARTICULAR; INTRAMUSCULAR at 14:04

## 2022-10-31 NOTE — PROGRESS NOTES
"  Marielle Hansen is a 72 y.o. female   Primary provider:  Yoselin Rubio MD       Chief Complaint   Patient presents with   • Left Knee - Pain       HISTORY OF PRESENT ILLNESS: This 72-year-old female patient presents today with new complaints of left knee pain.  This patient has a history of a total left knee, she believes it was performed in 2015.  This patient recently had a total left hip 4 weeks ago.  This procedure was performed by Dr. Rutherford on 9/29/2022.  The patient indicates that since her day of surgery and she started ambulating, she has had left knee pain.  The patient describes her pain as radiating down the front of her thigh to the top of her knee.  The patient indicates that she cannot walk without her knee buckling.  The patient states that she feels like she will fall when she is ambulating.  The patient is using a walker at today's visit.  The patient son is present with her.  He indicates that the patient cannot walk without her walker.  The patient denies hip pain or back pain.  Patient indicates that the only pain she has is in her knee.  The patient describes her pain as severe, intermittent to constant with stabbing, burning, swelling and bruising.  Patient reports is worse with WBA.  The patient reports she has tried rest.  Patient denies relief with any intervention.  This patient denies injury or fall.  Denies buckling or lucency but \"feels something\".    Pain  This is a new problem. The current episode started more than 1 month ago. The problem occurs constantly. The problem has been gradually worsening. Associated symptoms include arthralgias and joint swelling. The symptoms are aggravated by walking and standing. She has tried rest for the symptoms.        CONCURRENT MEDICAL HISTORY:    Past Medical History:   Diagnosis Date   • Arthritis    • CKD (chronic kidney disease)    • Disease of thyroid gland    • Diverticulitis    • GERD (gastroesophageal reflux disease)    • Hiatal " hernia    • Hyperlipidemia    • Hypertension    • Stroke (HCC)    • TIA (transient ischemic attack)        Allergies   Allergen Reactions   • Codeine Hives   • Famotidine GI Intolerance         Current Outpatient Medications:   •  albuterol sulfate  (90 Base) MCG/ACT inhaler, Inhale 2 puffs Every 4 (Four) Hours As Needed for Wheezing., Disp: 18 g, Rfl: 5  •  amLODIPine (NORVASC) 5 MG tablet, Take 1 tablet by mouth Daily., Disp: 90 tablet, Rfl: 2  •  cyanocobalamin 1000 MCG/ML injection, Inject 1 mL into the appropriate muscle as directed by prescriber Every 30 (Thirty) Days., Disp: 1 mL, Rfl: 5  •  D3-50 1.25 MG (63052 UT) capsule, TAKE 1 CAPSULE BY MOUTH EVERY MONDAY FRIDAY, Disp: 8 capsule, Rfl: 5  •  DRYSOL 20 % external solution, Apply 1 each topically to the appropriate area as directed As Needed., Disp: , Rfl:   •  escitalopram (LEXAPRO) 10 MG tablet, TAKE 1 TABLET BY MOUTH EVERY NIGHT AT BEDTIME per pt, Disp: 90 tablet, Rfl: 0  •  levothyroxine (SYNTHROID, LEVOTHROID) 137 MCG tablet, Take 1 tablet by mouth Daily., Disp: 90 tablet, Rfl: 2  •  lovastatin (MEVACOR) 40 MG tablet, Take 40 mg by mouth Every Night., Disp: , Rfl:   •  metoprolol succinate XL (TOPROL-XL) 100 MG 24 hr tablet, Take 1 tablet by mouth Daily., Disp: 90 tablet, Rfl: 2  •  olmesartan-hydrochlorothiazide (BENICAR HCT) 40-12.5 MG per tablet, TAKE 1 TABLET BY MOUTH EVERY DAY, Disp: 90 tablet, Rfl: 0  •  ondansetron ODT (Zofran ODT) 4 MG disintegrating tablet, Place 1 tablet on the tongue Every 8 (Eight) Hours As Needed for Nausea or Vomiting., Disp: 30 tablet, Rfl: 5  •  oxyCODONE-acetaminophen (PERCOCET) 7.5-325 MG per tablet, Take 1 tablet by mouth Every 8 (Eight) Hours As Needed for Severe Pain., Disp: 30 tablet, Rfl: 0  •  traZODone (DESYREL) 150 MG tablet, TAKE 2 TABLETS BY MOUTH EVERY NIGHT AT BEDTIME, Disp: 60 tablet, Rfl: 5  No current facility-administered medications for this visit.    Past Surgical History:   Procedure  Laterality Date   • APPENDECTOMY     • CHOLECYSTECTOMY     • COLONOSCOPY N/A 2020    Procedure: COLONOSCOPY;  Surgeon: Ashlyn King MD;  Location: [facility] ENDOSCOPY;  Service: Gastroenterology   • COLONOSCOPY N/A 2022    Procedure: COLONOSCOPY;  Surgeon: Ashlyn King MD;  Location: [facility] ENDOSCOPY;  Service: Gastroenterology;  Laterality: N/A;   • CYSTOSCOPY, URETEROSCOPY, RETROGRADE PYELOGRAM, STENT INSERTION Left 2020    Procedure: CYSTOSCOPY LEFT URETEROSCOPY RETROGRADE PYELOGRAM STENT INSERTION;  Surgeon: Johnny Chino MD;  Location: [facility] OR;  Service: Urology;  Laterality: Left;   • ENDOSCOPY N/A 2020    Procedure: ESOPHAGOGASTRODUODENOSCOPY;  Surgeon: Ashlyn King MD;  Location: [facility] ENDOSCOPY;  Service: Gastroenterology   • ENDOSCOPY N/A 2022    Procedure: ESOPHAGOGASTRODUODENOSCOPY;  Surgeon: Ashlyn King MD;  Location: [facility] ENDOSCOPY;  Service: Gastroenterology;  Laterality: N/A;   • EXTRACORPOREAL SHOCKWAVE LITHOTRIPSY (ESWL), STENT INSERTION/REMOVAL Left 2020    Procedure: LEFT EXTRACORPOREAL SHOCKWAVE LITHOTRIPSY;  Surgeon: Johnny Chino MD;  Location: [facility] OR;  Service: Urology;  Laterality: Left;   • HYSTERECTOMY     • TOTAL HIP ARTHROPLASTY Left 2022    Procedure: LEFT TOTAL HIP ARTHROPLASTY ANTERIOR;  Surgeon: Johnny Rutherford MD;  Location: [facility];  Service: Orthopedics;  Laterality: Left;   • TOTAL KNEE ARTHROPLASTY Bilateral        No family history on file.    Social History     Socioeconomic History   • Marital status:    Tobacco Use   • Smoking status: Former     Types: Cigarettes     Quit date: 2000     Years since quittin.8   • Smokeless tobacco: Never   Vaping Use   • Vaping Use: Never used   Substance and Sexual Activity   • Alcohol use: No   • Drug use: Never   • Sexual activity: Defer        Review of Systems   Constitutional: Negative.    Eyes: Negative.    Respiratory: Negative.   "  Cardiovascular: Negative.    Gastrointestinal: Negative.    Endocrine: Negative.    Genitourinary: Negative.    Musculoskeletal: Positive for arthralgias and joint swelling.   Skin: Negative.    Allergic/Immunologic: Negative.    Neurological: Negative.    Hematological: Negative.    Psychiatric/Behavioral: Negative.        PHYSICAL EXAMINATION:       Ht 170.2 cm (67\")   Wt 95.7 kg (210 lb 14.4 oz)   BMI 33.03 kg/m²     Physical Exam  Musculoskeletal:      Left knee: No effusion.         GAIT:     []  Normal  [x]  Antalgic    Assistive device: []  None  [x]  Walker     []  Crutches  []  Cane     []  Wheelchair  []  Stretcher    Left Knee Exam     Tenderness   Left knee tenderness location: Diffuse tenderness at the anterior knee above the joint line.  The patient indicates that radiates from the middle of the thigh down to her knee.    Range of Motion   Extension: normal   Flexion: 110     Other   Erythema: absent  Scars: present  Sensation: normal  Pulse: present  Swelling: mild  Effusion: no effusion present    Comments:  Mild pain with arc of motion.  Pain more significant with WBA.  Distal pulse palpable.  Skin warm, dry and intact.  No signs of infection.  Negative Homans' sign.  Calf nontender.                  XR Hip With or Without Pelvis 2 - 3 View Left    Result Date: 10/14/2022  Narrative: Two view left hip with single view pelvis. HISTORY: Pain. Follow-up arthroplasty. AP and frog-leg lateral views of the left hip and AP film of the pelvis obtained. COMPARISON: September 29, 2022. FINDINGS: No fracture or dislocation. Stable left total hip prosthesis. Degenerative changes right hip with small osteophytes and narrowing of the joint space superiorly and laterally. Minimal degenerative change each iliac crest. Degenerative changes lumbar spine with degenerative disc disease L4-5. No other osseous or articular abnormality. Pelvic phleboliths.     Impression: CONCLUSION: Stable left total hip prosthesis. " Degenerative changes right hip with small osteophytes and narrowing of the joint space superiorly and laterally. [id number] Electronically signed by:  Holden Rodrgiuez MD  10/14/2022 6:35 PM CDT Workstation: [id number]          ASSESSMENT:    Diagnoses and all orders for this visit:    Acute pain of left knee  -     NM Bone Scan 3 Phase  -     triamcinolone acetonide (KENALOG-40) injection 80 mg    History of total left knee replacement  -     NM Bone Scan 3 Phase  -     triamcinolone acetonide (KENALOG-40) injection 80 mg          PLAN  Sent for x-ray upon arrival.  Reviewed x-ray and discussed with patient and son.  Also attempted to discuss that her pain may be related to her history of degenerative disc disease in her lumbar spine.  Her son and herself are sure it is not related to her back pain.  This patient indicates that she is still in therapy for her hip.  The patient indicates that her knee pain is made it difficult to perform therapy as recommended.  Due to patient's complaint and inability to ambulate without significant pain and feeling like she is going to fall, I recommend a bone scan of the left knee to evaluate for lucency of the prostheses.  Also recommended the patient wear a knee immobilizer for support.  Patient provided a knee immobilizer at this visit.  Also recommended the patient continue to use her walker to give her support and prevent her from falling.  Encouraged RICE therapy.  Advised she would follow-up after the bone scan is completed.  May follow-up sooner as needed if symptoms change or worsen.    All questions and concerns are addressed with understanding noted. They are aware and are in agreement to this plan.    Return for for bone scan results.    BIBI Garrett     This document has been electronically signed by BIBI Garrett on October 31, 2022 16:49 CDT      EMR Dragon/Transciption Disclaimer: Some of this note may be an electronic transcription/translation of  spoken language to printed text using the Dragon Dictation System.     Time spent of a minimum of 30 minutes including the face to face evaluation, reviewing of medical history and prior medial records, reviewing of diagnostic studies, ordering additional tests, documentation, patient education and coordination of care.

## 2022-11-01 ENCOUNTER — TELEPHONE (OUTPATIENT)
Dept: FAMILY MEDICINE CLINIC | Facility: CLINIC | Age: 72
End: 2022-11-01

## 2022-11-01 ENCOUNTER — HOME CARE VISIT (OUTPATIENT)
Dept: HOME HEALTH SERVICES | Facility: CLINIC | Age: 72
End: 2022-11-01

## 2022-11-01 PROCEDURE — G0157 HHC PT ASSISTANT EA 15: HCPCS

## 2022-11-02 DIAGNOSIS — M54.41 CHRONIC BILATERAL LOW BACK PAIN WITH BILATERAL SCIATICA: Primary | ICD-10-CM

## 2022-11-02 DIAGNOSIS — G89.29 CHRONIC BILATERAL LOW BACK PAIN WITH BILATERAL SCIATICA: Primary | ICD-10-CM

## 2022-11-02 DIAGNOSIS — M54.42 CHRONIC BILATERAL LOW BACK PAIN WITH BILATERAL SCIATICA: Primary | ICD-10-CM

## 2022-11-02 RX ORDER — HYDROCODONE BITARTRATE AND ACETAMINOPHEN 7.5; 325 MG/1; MG/1
1 TABLET ORAL EVERY 6 HOURS PRN
Qty: 120 TABLET | Refills: 0 | Status: SHIPPED | OUTPATIENT
Start: 2022-11-02 | End: 2022-11-10 | Stop reason: SDUPTHER

## 2022-11-02 NOTE — TELEPHONE ENCOUNTER
She is needing refills on norco.. she had knee surgery last month and dr monahan gave her percocet after surgery but she is needing her norco.  She just had a short supply of the percocet

## 2022-11-03 VITALS
OXYGEN SATURATION: 97 % | RESPIRATION RATE: 18 BRPM | SYSTOLIC BLOOD PRESSURE: 140 MMHG | DIASTOLIC BLOOD PRESSURE: 66 MMHG | HEART RATE: 77 BPM | TEMPERATURE: 96.6 F

## 2022-11-03 NOTE — HOME HEALTH
Patient states she is tired from Sunday going to a reception and going to MD yesterday. Still difficult to walk on left knee. L hip is doing fine.

## 2022-11-04 ENCOUNTER — HOME CARE VISIT (OUTPATIENT)
Dept: HOME HEALTH SERVICES | Facility: CLINIC | Age: 72
End: 2022-11-04

## 2022-11-04 PROCEDURE — G0157 HHC PT ASSISTANT EA 15: HCPCS

## 2022-11-05 VITALS
HEART RATE: 62 BPM | RESPIRATION RATE: 18 BRPM | SYSTOLIC BLOOD PRESSURE: 138 MMHG | DIASTOLIC BLOOD PRESSURE: 82 MMHG | TEMPERATURE: 96.6 F | OXYGEN SATURATION: 96 %

## 2022-11-05 NOTE — CASE COMMUNICATION
Patient is progressing toward all goals achieved.  Patient feels safe and able to manage independently without concern.  Patient would benefit from 1x visit to finalize and ensure independence with HEP and MMT. Patient has been challenged with all activity and functional mobility due to left knee pain, which has limited PT progression.

## 2022-11-08 ENCOUNTER — HOME CARE VISIT (OUTPATIENT)
Dept: HOME HEALTH SERVICES | Facility: CLINIC | Age: 72
End: 2022-11-08

## 2022-11-08 PROCEDURE — G0151 HHCP-SERV OF PT,EA 15 MIN: HCPCS

## 2022-11-08 NOTE — HOME HEALTH
Patient states that she is doing much better at this time. States that she is walking short distances in home w/o use of RW and has been able to tolerate FWB on L LE w/o increased pain level. States that she continues to use walker outside the home but not having significnat pain issues over the last few days. Feels that she is doing well with all her activities and does not wish to conitnue at an outpatient level at this time. Patient agreeable to DC and reporting being able to complete all her daily tasks indep.

## 2022-11-10 ENCOUNTER — TELEMEDICINE (OUTPATIENT)
Dept: FAMILY MEDICINE CLINIC | Facility: CLINIC | Age: 72
End: 2022-11-10

## 2022-11-10 VITALS — BODY MASS INDEX: 32.96 KG/M2 | HEIGHT: 67 IN | WEIGHT: 210 LBS

## 2022-11-10 DIAGNOSIS — Z96.642 STATUS POST LEFT HIP REPLACEMENT: ICD-10-CM

## 2022-11-10 DIAGNOSIS — G89.29 CHRONIC PAIN OF LEFT KNEE: ICD-10-CM

## 2022-11-10 DIAGNOSIS — I10 ESSENTIAL HYPERTENSION: ICD-10-CM

## 2022-11-10 DIAGNOSIS — N39.3 STRESS INCONTINENCE OF URINE: ICD-10-CM

## 2022-11-10 DIAGNOSIS — G89.29 CHRONIC BILATERAL LOW BACK PAIN WITH BILATERAL SCIATICA: Primary | ICD-10-CM

## 2022-11-10 DIAGNOSIS — M54.41 CHRONIC BILATERAL LOW BACK PAIN WITH BILATERAL SCIATICA: Primary | ICD-10-CM

## 2022-11-10 DIAGNOSIS — M54.42 CHRONIC BILATERAL LOW BACK PAIN WITH BILATERAL SCIATICA: Primary | ICD-10-CM

## 2022-11-10 DIAGNOSIS — M25.562 CHRONIC PAIN OF LEFT KNEE: ICD-10-CM

## 2022-11-10 PROCEDURE — 99214 OFFICE O/P EST MOD 30 MIN: CPT | Performed by: FAMILY MEDICINE

## 2022-11-10 RX ORDER — HYDROCODONE BITARTRATE AND ACETAMINOPHEN 7.5; 325 MG/1; MG/1
1 TABLET ORAL EVERY 6 HOURS PRN
Qty: 120 TABLET | Refills: 0 | Status: SHIPPED | OUTPATIENT
Start: 2022-11-10 | End: 2022-12-12 | Stop reason: SDUPTHER

## 2022-11-10 NOTE — PROGRESS NOTES
Subjective    Seen today by video visit due to the Covid-19 quarantine.   You have chosen to receive care through a telehealth visit.  Do you consent to use a video/audio connection for your medical care today? Yes  Patient with hypertension, hypothyroidism, chronic low back pain, who recently had her left hip replaced.  She has been released from PT.  She had a lot of trouble with her left knee after the surgery but she can walk some now without her walker.  She tells me that orthopedics has ordered a bone scan to further evaluate the knee issues.    Her blood pressure has been staying at goal.  She has been keeping an eye on it.      She is having some urinary incontinence.  She notices it when she goes to sit down or stand up.  She was prescribed some medicine for overactive bladder several years ago but never took it so does not know if it helped or not    She had been on hydrocodone for the chronic low back pain.  Orthopedics gave her Percocet in the perioperative time which worked well for her but she has not gone back to the hydrocodone for the back pain and will need a refill    History of Present Illness   The following portions of the patient's history were reviewed and updated as appropriate: allergies, current medications, past family history, past medical history, past social history, past surgical history and problem list.  Back Pain   This is a chronic problem. The current episode started more than 1 year ago. The problem occurs constantly. The problem is unchanged. The pain is present in the lumbar spine. The quality of the pain is described as shooting, stabbing and aching. The pain radiates to the right knee and left knee. The pain is at a severity of 8/10. The pain is severe. The pain is the same all the time. The symptoms are aggravated by standing, twisting, position, bending and sitting. Stiffness is present at night, all day and in the morning. Associated symptoms include leg pain and  "tingling. Pertinent negatives include no abdominal pain, bladder incontinence, bowel incontinence, chest pain, dysuria, fever, headaches, numbness, paresis, paresthesias, pelvic pain, perianal numbness, weakness or weight loss. Risk factors include sedentary lifestyle. Patient has tried analgesics, NSAIDs, muscle relaxant and heat for the symptoms. The treatment provided mild relief.     Review of Systems   Respiratory: Negative.    Cardiovascular: Negative.    Genitourinary: Negative for hematuria and vaginal bleeding.   Musculoskeletal: Positive for gait problem.   Skin: Negative.    Allergic/Immunologic: Negative for immunocompromised state.   Neurological: Negative for dizziness, tremors, seizures, syncope and light-headedness.   Hematological: Negative.    Psychiatric/Behavioral: Positive for sleep disturbance. Negative for agitation and confusion. The patient is nervous/anxious.    All other systems reviewed and are negative.      Objective    Vitals:    11/10/22 1047   Weight: 95.3 kg (210 lb)   Height: 170.2 cm (67\")   PainSc: 0-No pain   Body mass index is 32.89 kg/m².    Physical Exam   Constitutional: She is oriented to person, place, and time. She appears well-developed and well-nourished. No distress.   HENT:   Head: Normocephalic and atraumatic.   Eyes: Pupils are equal, round, and reactive to light. EOM are normal. Right eye exhibits no discharge. Left eye exhibits no discharge.   Pulmonary/Chest: Effort normal.   Neurological: She is alert and oriented to person, place, and time.   Psychiatric: She has a normal mood and affect. Her behavior is normal. Judgment and thought content normal.       Assessment & Plan   Diagnoses and all orders for this visit:    1. Chronic bilateral low back pain with bilateral sciatica (Primary)  -     HYDROcodone-acetaminophen (NORCO) 7.5-325 MG per tablet; Take 1 tablet by mouth Every 6 (Six) Hours As Needed for Moderate Pain.  Dispense: 120 tablet; Refill: 0    2. " Essential hypertension    3. Stress incontinence of urine    4. Chronic pain of left knee    5. Status post left hip replacement    The patient has read and signed the [facility] Controlled Substance Contract.  I will continue to see patient for regular follow up appointments. Patient is well controlled on the medication.  MARY has been reviewed by me and is updated every 3 months. The patient is aware of the potential for addiction and dependence.     Will continue on hydrocodone for the chronic low back pain, and it should help also with the knee pain and postoperative hip pain.    She will follow-up with orthopedics regarding the knee pain issues as well    Continue current medications for hypertension and continue to monitor blood pressures regularly with goal of 130/80 or less    Have advised her to come into the office for an exam.  Suspect that the stress incontinence may be due to bladder prolapse and we can discuss options for treatment        This document has been electronically signed by Yoselin Rubio MD on November 10, 2022 11:10 CST

## 2022-11-11 RX ORDER — CYANOCOBALAMIN 1000 UG/ML
INJECTION, SOLUTION INTRAMUSCULAR; SUBCUTANEOUS
Qty: 3 ML | Refills: 0 | Status: SHIPPED | OUTPATIENT
Start: 2022-11-11 | End: 2023-02-03

## 2022-11-14 VITALS
DIASTOLIC BLOOD PRESSURE: 76 MMHG | HEART RATE: 67 BPM | SYSTOLIC BLOOD PRESSURE: 132 MMHG | RESPIRATION RATE: 20 BRPM | TEMPERATURE: 98.3 F | OXYGEN SATURATION: 96 %

## 2022-11-14 NOTE — CASE COMMUNICATION
Home Health Physical Therapy Agency Discharge (11/8/22)    CONDITION/PROBLEMS AT TIME OF ADMISSION (REASON FOR REFERRAL):  Patient is a 73 y/o female who was seen for OASIS SOC with physical therapy evaluation on 9/30/22 secondary to elective L MAGO performed by Dr. Rutherford on 9/26/22.  SUMMARY OF CARE PROVIDED:  gait training, ROM, strength training, transfer training, balance, stair navigation, AD use, safety education, fall risk educati on, HEP  PROGRESSION TOWARDS GOALS: all goals met upon DC from skilled home care at this time; indep w/ all activtiies in the home, ambulating in home w/o use of AD, navigating 12 steps to enter and exit home indep  ANY FALLS/ED VISITS/HOSPITALIZATION(S): none  DISCHARGE REASON:  goals met; indep w/ all home activities and gait activities, indep w/ HEP  UPCOMING MD APPTS/COUMADIN CLINIC:  11/10/22 with Dr. Rubio    PT attempted to re ziyad for outpatient but refusing outpatient PT services. Edcuated patient to contact Dr. Darden's office for referral if wanting to persue outpatient PT following home care DC.

## 2022-11-15 ENCOUNTER — HOSPITAL ENCOUNTER (OUTPATIENT)
Dept: NUCLEAR MEDICINE | Facility: HOSPITAL | Age: 72
Discharge: HOME | End: 2022-11-15

## 2022-11-15 PROCEDURE — 78315 BONE IMAGING 3 PHASE: CPT

## 2022-11-15 PROCEDURE — A9503 TC99M MEDRONATE: HCPCS | Performed by: NURSE PRACTITIONER

## 2022-11-15 PROCEDURE — 0 TECHNETIUM MEDRONATE KIT: Performed by: NURSE PRACTITIONER

## 2022-11-15 RX ORDER — TC 99M MEDRONATE 20 MG/10ML
21 INJECTION, POWDER, LYOPHILIZED, FOR SOLUTION INTRAVENOUS
Status: COMPLETED | OUTPATIENT
Start: 2022-11-15 | End: 2022-11-15

## 2022-11-15 RX ADMIN — Medication 21 MILLICURIE: at 09:34

## 2022-11-16 ENCOUNTER — APPOINTMENT (OUTPATIENT)
Dept: NUCLEAR MEDICINE | Facility: HOSPITAL | Age: 72
End: 2022-11-16

## 2022-11-17 ENCOUNTER — TELEPHONE (OUTPATIENT)
Dept: ORTHOPEDIC SURGERY | Facility: CLINIC | Age: 72
End: 2022-11-17

## 2022-11-17 NOTE — TELEPHONE ENCOUNTER
----- Message from BIBI Garrett sent at 11/17/2022  2:02 PM CST -----  Please advise patient her bone scan shows no sign of loosening of her implant. I recommend she follow up with Dr. Rutherford if her knee pain persist. If he is not available, she can follow up with me on a day he is available or with Dr. Morgan.     Thanks,

## 2022-12-05 ENCOUNTER — OFFICE VISIT (OUTPATIENT)
Dept: FAMILY MEDICINE CLINIC | Facility: CLINIC | Age: 72
End: 2022-12-05

## 2022-12-05 ENCOUNTER — LAB (OUTPATIENT)
Dept: LAB | Facility: OTHER | Age: 72
End: 2022-12-05

## 2022-12-05 VITALS
OXYGEN SATURATION: 98 % | WEIGHT: 210 LBS | TEMPERATURE: 98.7 F | BODY MASS INDEX: 32.96 KG/M2 | DIASTOLIC BLOOD PRESSURE: 68 MMHG | SYSTOLIC BLOOD PRESSURE: 128 MMHG | HEART RATE: 70 BPM | HEIGHT: 67 IN

## 2022-12-05 DIAGNOSIS — G89.29 CHRONIC BILATERAL LOW BACK PAIN WITH BILATERAL SCIATICA: ICD-10-CM

## 2022-12-05 DIAGNOSIS — I10 ESSENTIAL HYPERTENSION: ICD-10-CM

## 2022-12-05 DIAGNOSIS — R32 URINARY INCONTINENCE, UNSPECIFIED TYPE: ICD-10-CM

## 2022-12-05 DIAGNOSIS — R32 URINARY INCONTINENCE, UNSPECIFIED TYPE: Primary | ICD-10-CM

## 2022-12-05 DIAGNOSIS — N32.81 OAB (OVERACTIVE BLADDER): ICD-10-CM

## 2022-12-05 DIAGNOSIS — M54.41 CHRONIC BILATERAL LOW BACK PAIN WITH BILATERAL SCIATICA: ICD-10-CM

## 2022-12-05 DIAGNOSIS — M54.42 CHRONIC BILATERAL LOW BACK PAIN WITH BILATERAL SCIATICA: ICD-10-CM

## 2022-12-05 DIAGNOSIS — Z51.81 MEDICATION MONITORING ENCOUNTER: ICD-10-CM

## 2022-12-05 LAB
BILIRUB UR QL STRIP: NEGATIVE
CLARITY UR: ABNORMAL
COLOR UR: YELLOW
GLUCOSE UR STRIP-MCNC: NEGATIVE MG/DL
HGB UR QL STRIP.AUTO: NEGATIVE
KETONES UR QL STRIP: NEGATIVE
LEUKOCYTE ESTERASE UR QL STRIP.AUTO: NEGATIVE
NITRITE UR QL STRIP: NEGATIVE
PH UR STRIP.AUTO: 5.5 [PH] (ref 5.5–8)
PROT UR QL STRIP: NEGATIVE
SP GR UR STRIP: 1.01 (ref 1–1.03)
UROBILINOGEN UR QL STRIP: ABNORMAL

## 2022-12-05 PROCEDURE — 81003 URINALYSIS AUTO W/O SCOPE: CPT | Performed by: FAMILY MEDICINE

## 2022-12-05 PROCEDURE — 80307 DRUG TEST PRSMV CHEM ANLYZR: CPT | Performed by: FAMILY MEDICINE

## 2022-12-05 PROCEDURE — G0481 DRUG TEST DEF 8-14 CLASSES: HCPCS | Performed by: FAMILY MEDICINE

## 2022-12-05 PROCEDURE — 99214 OFFICE O/P EST MOD 30 MIN: CPT | Performed by: FAMILY MEDICINE

## 2022-12-05 NOTE — PROGRESS NOTES
Subjective    Patient with hypertension, chronic low back pain, vitamin deficiencies here today for follow-up and with concern about urinary issues.  She has urinary frequency and urgency.  If she cannot get to the bathroom soon enough she will sometimes leak.  She also experiences leaking of urine with coughing sneezing or anything that increases intra-abdominal pressure.  This has gone on for over a year, seems to be getting worse.  She feels the need to void very frequently and in fact is having to get up 5 or 6 times a night to urinate.    History of Present Illness   The following portions of the patient's history were reviewed and updated as appropriate: allergies, current medications, past family history, past medical history, past social history, past surgical history and problem list.  Back Pain   This is a chronic problem. The current episode started more than 1 year ago. The problem occurs constantly. The problem is unchanged. The pain is present in the lumbar spine. The quality of the pain is described as shooting, stabbing and aching. The pain radiates to the right knee and left knee. The pain is at a severity of 8/10. The pain is severe. The pain is the same all the time. The symptoms are aggravated by standing, twisting, position, bending and sitting. Stiffness is present at night, all day and in the morning. Associated symptoms include leg pain and tingling. Pertinent negatives include no abdominal pain, bladder incontinence, bowel incontinence, chest pain, dysuria, fever, headaches, numbness, paresis, paresthesias, pelvic pain, perianal numbness, weakness or weight loss. Risk factors include sedentary lifestyle. Patient has tried analgesics, NSAIDs, muscle relaxant and heat for the symptoms. The treatment provided mild relief.     Review of Systems   Respiratory: Negative.    Cardiovascular: Negative.    Genitourinary: Negative for hematuria and vaginal bleeding.   Musculoskeletal: Positive for gait  "problem.   Skin: Negative.    Allergic/Immunologic: Negative for immunocompromised state.   Neurological: Negative for dizziness, tremors, seizures, syncope and light-headedness.   Hematological: Negative.    Psychiatric/Behavioral: Positive for sleep disturbance. Negative for agitation and confusion. The patient is nervous/anxious.    All other systems reviewed and are negative.      Objective    Vitals:    12/05/22 1055   BP: 128/68   Pulse: 70   Temp: 98.7 °F (37.1 °C)   SpO2: 98%   Weight: 95.3 kg (210 lb)   Height: 170.2 cm (67\")   PainSc: 0-No pain   Body mass index is 32.89 kg/m².    Physical Exam   Constitutional: She is oriented to person, place, and time. She appears well-developed. No distress.   HENT:   Head: Normocephalic and atraumatic.   Eyes: Pupils are equal, round, and reactive to light. Right eye exhibits no discharge. Left eye exhibits no discharge.   Pulmonary/Chest: Effort normal.   Genitourinary:    Genitourinary Comments: Pelvic exam reveals atrophic appearing vaginal mucosa, surgical absence of uterus.  No significant cystocele or bladder prolapse.     Neurological: She is alert and oriented to person, place, and time.   Psychiatric: Her behavior is normal. Judgment and thought content normal.       Assessment & Plan   Diagnoses and all orders for this visit:    1. Urinary incontinence, unspecified type (Primary)  -     Urinalysis With Culture If Indicated -; Future    2. OAB (overactive bladder)  -     Mirabegron ER (Myrbetriq) 25 MG tablet sustained-release 24 hour 24 hr tablet; Take 1 tablet by mouth Daily.  Dispense: 30 tablet; Refill: 5    3. Essential hypertension    4. Chronic bilateral low back pain with bilateral sciatica    Will try Myrbetriq.  If this does not work of discussed possibly referring the patient for physical therapy for incontinence and she will consider this.  UA is negative for infection today.    Continue current medications for hypertension and continue to monitor " blood pressures regularly with goal of 130/80 or less    Continue hydrocodone when needed for chronic low back pain, let me know when refills due    The patient has read and signed the [facility] Controlled Substance Contract.  I will continue to see patient for regular follow up appointments. Patient is well controlled on the medication.  MARY has been reviewed by me and is updated every 3 months. The patient is aware of the potential for addiction and dependence.           This document has been electronically signed by Yoselin Rubio MD on December 5, 2022 11:06 CST

## 2022-12-06 ENCOUNTER — TELEPHONE (OUTPATIENT)
Dept: FAMILY MEDICINE CLINIC | Facility: CLINIC | Age: 72
End: 2022-12-06

## 2022-12-06 NOTE — TELEPHONE ENCOUNTER
Called pt about neg UA results. Pt voiced understanding    ----- Message from Yoselin Rubio MD sent at 12/5/2022  5:03 PM CST -----  Notify patient test results are ok, Urine test negative for infection

## 2022-12-12 DIAGNOSIS — M54.41 CHRONIC BILATERAL LOW BACK PAIN WITH BILATERAL SCIATICA: ICD-10-CM

## 2022-12-12 DIAGNOSIS — G89.29 CHRONIC BILATERAL LOW BACK PAIN WITH BILATERAL SCIATICA: ICD-10-CM

## 2022-12-12 DIAGNOSIS — M54.42 CHRONIC BILATERAL LOW BACK PAIN WITH BILATERAL SCIATICA: ICD-10-CM

## 2022-12-12 RX ORDER — HYDROCODONE BITARTRATE AND ACETAMINOPHEN 7.5; 325 MG/1; MG/1
1 TABLET ORAL EVERY 6 HOURS PRN
Qty: 120 TABLET | Refills: 0 | Status: SHIPPED | OUTPATIENT
Start: 2022-12-12 | End: 2023-01-09 | Stop reason: SDUPTHER

## 2022-12-14 LAB — DRUGS UR: NORMAL

## 2023-01-09 DIAGNOSIS — G89.29 CHRONIC BILATERAL LOW BACK PAIN WITH BILATERAL SCIATICA: ICD-10-CM

## 2023-01-09 DIAGNOSIS — M54.41 CHRONIC BILATERAL LOW BACK PAIN WITH BILATERAL SCIATICA: ICD-10-CM

## 2023-01-09 DIAGNOSIS — M54.42 CHRONIC BILATERAL LOW BACK PAIN WITH BILATERAL SCIATICA: ICD-10-CM

## 2023-01-09 RX ORDER — HYDROCODONE BITARTRATE AND ACETAMINOPHEN 7.5; 325 MG/1; MG/1
1 TABLET ORAL EVERY 6 HOURS PRN
Qty: 120 TABLET | Refills: 0 | Status: SHIPPED | OUTPATIENT
Start: 2023-01-09 | End: 2023-02-13 | Stop reason: SDUPTHER

## 2023-01-13 DIAGNOSIS — I10 ESSENTIAL HYPERTENSION: ICD-10-CM

## 2023-01-13 DIAGNOSIS — E03.9 HYPOTHYROIDISM, UNSPECIFIED TYPE: ICD-10-CM

## 2023-01-13 RX ORDER — OLMESARTAN MEDOXOMIL AND HYDROCHLOROTHIAZIDE 40/12.5 40; 12.5 MG/1; MG/1
TABLET ORAL
Qty: 90 TABLET | Refills: 0 | Status: SHIPPED | OUTPATIENT
Start: 2023-01-13

## 2023-01-13 RX ORDER — ESCITALOPRAM OXALATE 10 MG/1
TABLET ORAL
Qty: 90 TABLET | Refills: 0 | Status: SHIPPED | OUTPATIENT
Start: 2023-01-13

## 2023-01-16 RX ORDER — LEVOTHYROXINE SODIUM 137 UG/1
TABLET ORAL
Qty: 90 TABLET | Refills: 2 | Status: SHIPPED | OUTPATIENT
Start: 2023-01-16

## 2023-01-16 RX ORDER — METOPROLOL SUCCINATE 100 MG/1
TABLET, EXTENDED RELEASE ORAL
Qty: 90 TABLET | Refills: 2 | Status: SHIPPED | OUTPATIENT
Start: 2023-01-16

## 2023-01-16 RX ORDER — LOVASTATIN 40 MG/1
TABLET ORAL
Qty: 90 TABLET | Refills: 2 | Status: SHIPPED | OUTPATIENT
Start: 2023-01-16

## 2023-01-16 RX ORDER — AMLODIPINE BESYLATE 5 MG/1
TABLET ORAL
Qty: 90 TABLET | Refills: 2 | Status: SHIPPED | OUTPATIENT
Start: 2023-01-16

## 2023-02-03 RX ORDER — CYANOCOBALAMIN 1000 UG/ML
INJECTION, SOLUTION INTRAMUSCULAR; SUBCUTANEOUS
Qty: 3 ML | Refills: 0 | Status: SHIPPED | OUTPATIENT
Start: 2023-02-03

## 2023-02-03 RX ORDER — SYRINGE W-NEEDLE,DISPOSAB,3 ML 25GX5/8"
SYRINGE, EMPTY DISPOSABLE MISCELLANEOUS
Qty: 3 EACH | Refills: 0 | Status: SHIPPED | OUTPATIENT
Start: 2023-02-03

## 2023-02-13 DIAGNOSIS — G89.29 CHRONIC BILATERAL LOW BACK PAIN WITH BILATERAL SCIATICA: ICD-10-CM

## 2023-02-13 DIAGNOSIS — Z74.09 IMPAIRED MOBILITY AND ACTIVITIES OF DAILY LIVING: ICD-10-CM

## 2023-02-13 DIAGNOSIS — M16.12 PRIMARY OSTEOARTHRITIS OF LEFT HIP: ICD-10-CM

## 2023-02-13 DIAGNOSIS — M16.11 PRIMARY OSTEOARTHRITIS OF RIGHT HIP: ICD-10-CM

## 2023-02-13 DIAGNOSIS — Z74.09 IMPAIRED FUNCTIONAL MOBILITY, BALANCE, GAIT, AND ENDURANCE: ICD-10-CM

## 2023-02-13 DIAGNOSIS — M54.42 CHRONIC BILATERAL LOW BACK PAIN WITH BILATERAL SCIATICA: ICD-10-CM

## 2023-02-13 DIAGNOSIS — I10 ESSENTIAL HYPERTENSION: ICD-10-CM

## 2023-02-13 DIAGNOSIS — Z78.9 IMPAIRED MOBILITY AND ACTIVITIES OF DAILY LIVING: ICD-10-CM

## 2023-02-13 DIAGNOSIS — J45.20 MILD INTERMITTENT ASTHMA WITHOUT COMPLICATION: ICD-10-CM

## 2023-02-13 DIAGNOSIS — M54.41 CHRONIC BILATERAL LOW BACK PAIN WITH BILATERAL SCIATICA: ICD-10-CM

## 2023-02-13 RX ORDER — OXYCODONE AND ACETAMINOPHEN 7.5; 325 MG/1; MG/1
TABLET ORAL
Qty: 30 TABLET | Refills: 0 | OUTPATIENT
Start: 2023-02-13

## 2023-02-13 RX ORDER — HYDROCODONE BITARTRATE AND ACETAMINOPHEN 7.5; 325 MG/1; MG/1
1 TABLET ORAL EVERY 6 HOURS PRN
Qty: 120 TABLET | Refills: 0 | Status: SHIPPED | OUTPATIENT
Start: 2023-02-13 | End: 2023-03-13 | Stop reason: SDUPTHER

## 2023-03-01 ENCOUNTER — OFFICE VISIT (OUTPATIENT)
Dept: GASTROENTEROLOGY | Facility: CLINIC | Age: 73
End: 2023-03-01
Payer: MEDICARE

## 2023-03-01 VITALS
WEIGHT: 216.4 LBS | HEART RATE: 68 BPM | SYSTOLIC BLOOD PRESSURE: 136 MMHG | HEIGHT: 67 IN | BODY MASS INDEX: 33.97 KG/M2 | DIASTOLIC BLOOD PRESSURE: 74 MMHG

## 2023-03-01 DIAGNOSIS — Z12.11 ENCOUNTER FOR COLONOSCOPY DUE TO HISTORY OF ADENOMATOUS COLONIC POLYPS: Primary | ICD-10-CM

## 2023-03-01 DIAGNOSIS — K22.70 BARRETT'S ESOPHAGUS WITHOUT DYSPLASIA: ICD-10-CM

## 2023-03-01 DIAGNOSIS — Z86.010 ENCOUNTER FOR COLONOSCOPY DUE TO HISTORY OF ADENOMATOUS COLONIC POLYPS: Primary | ICD-10-CM

## 2023-03-01 PROCEDURE — 99213 OFFICE O/P EST LOW 20 MIN: CPT | Performed by: NURSE PRACTITIONER

## 2023-03-01 RX ORDER — DEXTROSE AND SODIUM CHLORIDE 5; .45 G/100ML; G/100ML
30 INJECTION, SOLUTION INTRAVENOUS CONTINUOUS PRN
Status: CANCELLED | OUTPATIENT
Start: 2023-03-17

## 2023-03-01 RX ORDER — SODIUM CHLORIDE 9 MG/ML
40 INJECTION, SOLUTION INTRAVENOUS AS NEEDED
Status: CANCELLED | OUTPATIENT
Start: 2023-03-17

## 2023-03-01 RX ORDER — PEG-3350, SODIUM SULFATE, SODIUM CHLORIDE, POTASSIUM CHLORIDE, SODIUM ASCORBATE AND ASCORBIC ACID 7.5-2.691G
1000 KIT ORAL EVERY 12 HOURS
Qty: 2000 ML | Refills: 0 | Status: SHIPPED | OUTPATIENT
Start: 2023-03-01 | End: 2023-03-03

## 2023-03-01 NOTE — PROGRESS NOTES
Chief Complaint   Patient presents with   • Heartburn   • Diverticulosis     Recall Letter       Subjective    Marielle Hansen is a 72 y.o. female. she is here today for follow-up.                                                                  Assessment & Plan                                     1. Encounter for colonoscopy due to history of adenomatous colonic polyps    2. Milligan's esophagus without dysplasia      Plan; schedule patient for EGD for surveillance of Milligan's esophagus.  Colonoscopy surveillance of multiple adenomatous colonic polyps removed March 2022    Follow-up: Return in about 4 weeks (around 3/29/2023) for Recheck, After test.     HPI    72-year-old female presents for follow-up to plan EGD and colonoscopy.  Reports she received recall letter several months ago but had orthopedic surgery and could not follow-up until now.  States bowel movements are still the same alternating between diarrhea and constipation.  Reports she has not remember to take her fiber recently.  She denies any melena or hematochezia.  Takes a probiotic has been following a new diet with her daughter that is gluten and dairy free and cut out all processed foods.  States she has lost 14 pounds since December and is doing very well.  Her previous EGD was completed March 2022 noted hiatal hernia grade 2 esophagitis and normal duodenum.  GE junction biopsy noted Milligan's mucosa negative for dysplasia.  Colonoscopy noted 15 mm polyp in the cecum and multiple sessile polyps in the sigmoid, transverse and ascending colon and cecum diverticulosis was also seen in the sigmoid repeat was recommended in 6 months for review polypectomy site however patient failed to follow-up at that time.    Review of Systems  Review of Systems   Constitutional: Negative for activity change, appetite change, chills,  "diaphoresis, fatigue, fever and unexpected weight change.   HENT: Negative for sore throat and trouble swallowing.    Respiratory: Negative for shortness of breath.    Gastrointestinal: Positive for constipation and diarrhea. Negative for abdominal distention, abdominal pain, anal bleeding, blood in stool, nausea, rectal pain and vomiting.        Bowel habits still go back and forth has not been taking fiber    Musculoskeletal: Negative for arthralgias.   Skin: Negative for pallor.   Neurological: Negative for light-headedness.       /74   Pulse 68   Ht 170.2 cm (67\")   Wt 98.2 kg (216 lb 6.4 oz)   BMI 33.89 kg/m²     Objective      Physical Exam  Constitutional:       General: She is not in acute distress.     Appearance: Normal appearance. She is normal weight. She is not ill-appearing.   HENT:      Head: Normocephalic and atraumatic.   Pulmonary:      Effort: Pulmonary effort is normal.   Abdominal:      General: Abdomen is flat. Bowel sounds are normal. There is no distension.      Palpations: Abdomen is soft. There is no mass.      Tenderness: There is no abdominal tenderness.   Neurological:      Mental Status: She is alert.               The following portions of the patient's history were reviewed and updated as appropriate:   Past Medical History:   Diagnosis Date   • Arthritis    • CKD (chronic kidney disease)    • Disease of thyroid gland    • Diverticulitis    • GERD (gastroesophageal reflux disease)    • Hiatal hernia    • Hyperlipidemia    • Hypertension    • Stroke (HCC)    • TIA (transient ischemic attack)      Past Surgical History:   Procedure Laterality Date   • APPENDECTOMY     • CHOLECYSTECTOMY     • COLONOSCOPY N/A 01/30/2020    Procedure: COLONOSCOPY;  Surgeon: Ashlyn King MD;  Location: [facility] ENDOSCOPY;  Service: Gastroenterology   • COLONOSCOPY N/A 03/03/2022    Procedure: COLONOSCOPY;  Surgeon: Ashlyn King MD;  Location: [facility] ENDOSCOPY;  Service: Gastroenterology; "  Laterality: N/A;   • CYSTOSCOPY, URETEROSCOPY, RETROGRADE PYELOGRAM, STENT INSERTION Left 2020    Procedure: CYSTOSCOPY LEFT URETEROSCOPY RETROGRADE PYELOGRAM STENT INSERTION;  Surgeon: Johnny Chino MD;  Location: [facility] OR;  Service: Urology;  Laterality: Left;   • ENDOSCOPY N/A 2020    Procedure: ESOPHAGOGASTRODUODENOSCOPY;  Surgeon: Ashlyn King MD;  Location: [facility] ENDOSCOPY;  Service: Gastroenterology   • ENDOSCOPY N/A 2022    Procedure: ESOPHAGOGASTRODUODENOSCOPY;  Surgeon: Ashlyn King MD;  Location: [facility] ENDOSCOPY;  Service: Gastroenterology;  Laterality: N/A;   • EXTRACORPOREAL SHOCKWAVE LITHOTRIPSY (ESWL), STENT INSERTION/REMOVAL Left 2020    Procedure: LEFT EXTRACORPOREAL SHOCKWAVE LITHOTRIPSY;  Surgeon: Johnny Chino MD;  Location: [facility] OR;  Service: Urology;  Laterality: Left;   • HYSTERECTOMY     • TOTAL HIP ARTHROPLASTY Left 2022    Procedure: LEFT TOTAL HIP ARTHROPLASTY ANTERIOR;  Surgeon: Johnny Rutherford MD;  Location: [facility];  Service: Orthopedics;  Laterality: Left;   • TOTAL KNEE ARTHROPLASTY Bilateral      History reviewed. No pertinent family history.  OB History    No obstetric history on file.       Allergies   Allergen Reactions   • Codeine Hives   • Famotidine GI Intolerance     Social History     Socioeconomic History   • Marital status:    Tobacco Use   • Smoking status: Former     Types: Cigarettes     Quit date:      Years since quittin.1   • Smokeless tobacco: Never   Vaping Use   • Vaping Use: Never used   Substance and Sexual Activity   • Alcohol use: No   • Drug use: Never   • Sexual activity: Defer     Current Medications:  Prior to Admission medications    Medication Sig Start Date End Date Taking? Authorizing Provider   albuterol sulfate  (90 Base) MCG/ACT inhaler Inhale 2 puffs Every 4 (Four) Hours As Needed for Wheezing. 20  Yes Yoselin Rubio MD   amLODIPine (NORVASC) 5 MG tablet  "TAKE 1 TABLET BY MOUTH EVERY DAY 1/16/23  Yes Yoselin Rubio MD   cyanocobalamin 1000 MCG/ML injection INJECT 1 ML INTRAMUSCULARLY ONCE EVERY 30 DAYS 2/3/23  Yes Yoselin Rubio MD   D3-50 1.25 MG (06177 UT) capsule TAKE 1 CAPSULE BY MOUTH EVERY MONDAY FRIDAY 10/17/22  Yes Yoselin Rubio MD   DRYSOL 20 % external solution Apply 1 each topically to the appropriate area as directed As Needed. 2/6/20  Yes Hilario Jenkins MD   escitalopram (LEXAPRO) 10 MG tablet TAKE 1 TABLET BY MOUTH EVERY NIGHT AT BEDTIME per pt 1/13/23  Yes Yoselin Rubio MD   HYDROcodone-acetaminophen (NORCO) 7.5-325 MG per tablet Take 1 tablet by mouth Every 6 (Six) Hours As Needed for Moderate Pain. 2/13/23  Yes Yoselin Rubio MD   levothyroxine (SYNTHROID, LEVOTHROID) 137 MCG tablet TAKE 1 TABLET BY MOUTH EVERY DAY 1/16/23  Yes Yoselin Rubio MD   lovastatin (MEVACOR) 40 MG tablet TAKE 1 TABLET BY MOUTH EVERY EVENING 1/16/23  Yes Yoselin Rubio MD   metoprolol succinate XL (TOPROL-XL) 100 MG 24 hr tablet TAKE 1 TABLET BY MOUTH EVERY DAY 1/16/23  Yes Yoselin Rubio MD   Mirabegron ER (Myrbetriq) 25 MG tablet sustained-release 24 hour 24 hr tablet Take 1 tablet by mouth Daily. 12/5/22  Yes Yoselin Rubio MD   olmesartan-hydrochlorothiazide (BENICAR HCT) 40-12.5 MG per tablet TAKE 1 TABLET BY MOUTH EVERY DAY 1/13/23  Yes Yoselin Rubio MD   ondansetron ODT (Zofran ODT) 4 MG disintegrating tablet Place 1 tablet on the tongue Every 8 (Eight) Hours As Needed for Nausea or Vomiting. 7/20/22  Yes Yoselin Rubio MD   traZODone (DESYREL) 150 MG tablet TAKE 2 TABLETS BY MOUTH EVERY NIGHT AT BEDTIME 10/17/22  Yes Yoselin Rubio MD   VanishPoint Safety Syringe 25G X 1\" 3 ML misc USE AS DIRECTED with b12 2/3/23  Yes Yoselin Rubio MD     Orders placed during this encounter include:  Orders Placed This Encounter   Procedures   • Obtain Informed Consent     Standing Status:   Future     Order Specific Question:   " Informed Consent Given For     Answer:   egd and colonoscopy     ESOPHAGOGASTRODUODENOSCOPY (N/A), COLONOSCOPY (N/A)  New Medications Ordered This Visit   Medications   • PEG-KCl-NaCl-NaSulf-Na Asc-C (MoviPrep) 100 g reconstituted solution powder     Sig: Take 1,000 mL by mouth Every 12 (Twelve) Hours.     Dispense:  2000 mL     Refill:  0         Review and/or summary of lab tests, radiology, procedures, medications. Review and summary of old records and obtaining of history. The risks and benefits of my recommendations, as well as other treatment options were discussed . Any questions/concerned were answered. Patient voiced understanding and agreement.          This document has been electronically signed by BIBI Zazueta on March 1, 2023 11:10 CST                                               Results for orders placed or performed in visit on 12/05/22   ToxASSURE Select 13 (MW) - Urine, Clean Catch    Specimen: Urine, Clean Catch   Result Value Ref Range    Report Summary FINAL    Urinalysis With Culture If Indicated - Urine, Clean Catch    Specimen: Urine, Clean Catch   Result Value Ref Range    Color, UA Yellow Yellow, Straw    Appearance, UA Slightly Cloudy (A) Clear    pH, UA 5.5 5.5 - 8.0    Specific Gravity, UA 1.010 1.005 - 1.030    Glucose, UA Negative Negative    Ketones, UA Negative Negative    Bilirubin, UA Negative Negative    Blood, UA Negative Negative    Protein, UA Negative Negative    Leuk Esterase, UA Negative Negative    Nitrite, UA Negative Negative    Urobilinogen, UA 0.2 E.U./dL 0.2 - 1.0 E.U./dL   Results for orders placed or performed during the hospital encounter of 09/26/22   PREVIOUS HISTORY    Specimen: Blood   Result Value Ref Range    Previous History Previous Record on File    TISSUE EXAM, [facility] ([facility])    Specimen: Hip, Left; Tissue   Result Value Ref Range    Reference Lab Report       [facility]  [address] [place]   [address]  Phone: [phone] or [phone]  Fax: [phone]  Ronnell Santiago M.D., Medical Director    PATIENT NAME                           LABORATORY NO.  HARRY ALEXIS.                [id number]  [id number]                         AGE              SEX  SSN           CLIENT REF #  [facility]           72      1950  F    [id number]   [id number]    [place]                       REQUESTING M.D.     ATTENDING MJose AntonioD.     COPY TO[address]                 [place] [address]             DATE COLLECTED      DATE RECEIVED      DATE REPORTED  2022    DIAGNOSIS:  FEMORAL HEAD, GROSS ONLY, LEFT:  GROSS DIAGNOSIS:  Consistent with osteoarthritis    BLAKE/sm    CLINICAL HISTORY:  Primary osteoarthritis of left hip, spinal stenosis of lumbar region without  neurogenic claudication,  essential hypertension, primary osteoarthritis of right  hip, mild intermittent asthma without complication    SPECIMENS RECEIVED:  FEMORAL HEAD, GROSS ONLY, LEFT    Professional interpretation rendered by Vineet Gutierrez M.D., F.C.A.P. at [facility] [employer], [address] [place] [address].    GROSS DESCRIPTION:  Received in formalin labeled femoral head left hip is a 4.7 x 4.5 x 4 cm femoral  head with an attached 4 x 2.7 x 1 cm portion of femoral neck.  Also received  freely floating in the container is a 2 cm aggregate of bone fragments.  The  articular surface is diffusely eroded and eburnated.  The cut surface is firm,  brown and trabecular.  Moderate osteophyte formation is present.  No gross  lesions or areas of softening are identified.  No sections are submitted.  Gross  diagnosis only.  LDP    REVIEWED, DIAGNOSED AND ELECTRONICALLY  SIGNED BY:    Vineet Gutierrez M.D., F.C.A.P.  CPT CODES:  14813     Hemoglobin & Hematocrit, Blood    Specimen: Blood   Result Value Ref Range    Hemoglobin 11.6 (L)  12.0 - 15.9 g/dL    Hematocrit 35.2 34.0 - 46.6 %   Type & Screen    Specimen: Blood   Result Value Ref Range    ABO Type O     RH type Positive     Antibody Screen Negative     T&S Expiration Date 9/29/2022 11:59:59 PM    Results for orders placed or performed in visit on 09/20/22   PREVIOUS HISTORY    Specimen: Blood   Result Value Ref Range    Previous History Patient needs ABO/RH on day of surgery.    MRSA Screen, PCR (Inpatient) - Swab, Nares    Specimen: Nares; Swab   Result Value Ref Range    MRSA, PCR Negative Negative   Type and screen    Specimen: Blood   Result Value Ref Range    ABO Type O     RH type Positive     Antibody Screen Negative     T&S Expiration Date 9/23/2022 11:59:59 PM    ECG 12 Lead   Result Value Ref Range    QT Interval 444 ms    QTC Interval 428 ms   Results for orders placed or performed in visit on 09/12/22   CBC Auto Differential    Specimen: Blood   Result Value Ref Range    WBC 10.16 3.40 - 10.80 10*3/mm3    RBC 4.37 3.77 - 5.28 10*6/mm3    Hemoglobin 12.8 12.0 - 15.9 g/dL    Hematocrit 39.7 34.0 - 46.6 %    MCV 90.8 79.0 - 97.0 fL    MCH 29.3 26.6 - 33.0 pg    MCHC 32.2 31.5 - 35.7 g/dL    RDW 13.8 12.3 - 15.4 %    RDW-SD 44.7 37.0 - 54.0 fl    MPV 9.5 6.0 - 12.0 fL    Platelets 224 140 - 450 10*3/mm3    Neutrophil % 65.5 42.7 - 76.0 %    Lymphocyte % 25.4 19.6 - 45.3 %    Monocyte % 6.6 5.0 - 12.0 %    Eosinophil % 1.8 0.3 - 6.2 %    Basophil % 0.7 0.0 - 1.5 %    Neutrophils, Absolute 6.66 1.70 - 7.00 10*3/mm3    Lymphocytes, Absolute 2.58 0.70 - 3.10 10*3/mm3    Monocytes, Absolute 0.67 0.10 - 0.90 10*3/mm3    Eosinophils, Absolute 0.18 0.00 - 0.40 10*3/mm3    Basophils, Absolute 0.07 0.00 - 0.20 10*3/mm3   Lipid Panel    Specimen: Blood   Result Value Ref Range    Total Cholesterol 141 (L) 150 - 200 mg/dL    Triglycerides 278 (H) <=150 mg/dL    HDL Cholesterol 30 (L) 40 - 59 mg/dL    LDL Cholesterol  66 <=100 mg/dL    VLDL Cholesterol 45 (H) 5 - 40 mg/dL    LDL/HDL Ratio 1.85  0.00 - 3.22   Results for orders placed or performed in visit on 08/09/22   Vitamin B12 & Folate    Specimen: Blood   Result Value Ref Range    Folate 14.90 4.78 - 24.20 ng/mL    Vitamin B-12 1,007 (H) 211 - 946 pg/mL   Vitamin D 25 Hydroxy    Specimen: Blood   Result Value Ref Range    25 Hydroxy, Vitamin D 146.0 (H) 30.0 - 100.0 ng/ml   TSH    Specimen: Blood   Result Value Ref Range    TSH 1.010 0.270 - 4.200 uIU/mL   T4, Free    Specimen: Blood   Result Value Ref Range    Free T4 1.19 0.93 - 1.70 ng/dL   Comprehensive Metabolic Panel    Specimen: Blood   Result Value Ref Range    Glucose 135 (H) 70 - 99 mg/dL    BUN 21 7 - 23 mg/dL    Creatinine 1.28 (H) 0.52 - 1.04 mg/dL    Sodium 138 137 - 145 mmol/L    Potassium 4.4 3.4 - 5.0 mmol/L    Chloride 102 101 - 112 mmol/L    CO2 27.0 22.0 - 30.0 mmol/L    Calcium 9.4 8.4 - 10.2 mg/dL    Total Protein 7.5 6.3 - 8.6 g/dL    Albumin 4.40 3.50 - 5.00 g/dL    ALT (SGPT) 17 <=35 U/L    AST (SGOT) 21 14 - 36 U/L    Alkaline Phosphatase 48 38 - 126 U/L    Total Bilirubin 0.4 0.2 - 1.3 mg/dL    Globulin 3.1 2.3 - 3.5 gm/dL    A/G Ratio 1.4 1.1 - 1.8 g/dL    BUN/Creatinine Ratio 16.4 7.0 - 25.0    Anion Gap 9.0 5.0 - 15.0 mmol/L    eGFR 44.6 (L) >60.0 mL/min/1.73   Results for orders placed or performed in visit on 07/21/22   Urinalysis, Microscopic Only - Urine, Clean Catch    Specimen: Urine, Clean Catch   Result Value Ref Range    RBC, UA 0-2 (A) None Seen /HPF    WBC, UA 13-20 (A) None Seen /HPF    Bacteria, UA 3+ (A) None Seen /HPF    Squamous Epithelial Cells, UA 3-6 (A) None Seen, 0-2 /HPF    Hyaline Casts, UA None Seen None Seen /LPF    Methodology Manual Light Microscopy    Urinalysis With Culture If Indicated - Urine, Clean Catch    Specimen: Urine, Clean Catch   Result Value Ref Range    Color, UA Yellow Yellow, Straw    Appearance, UA Cloudy (A) Clear    pH, UA 5.5 5.5 - 8.0    Specific Gravity, UA 1.020 1.005 - 1.030    Glucose, UA Negative Negative    Ketones,  UA Negative Negative    Bilirubin, UA Negative Negative    Blood, UA Negative Negative    Protein, UA Negative Negative    Leuk Esterase, UA Trace (A) Negative    Nitrite, UA Positive (A) Negative    Urobilinogen, UA 0.2 E.U./dL 0.2 - 1.0 E.U./dL   Urine Culture - Urine, Urine, Clean Catch    Specimen: Urine, Clean Catch   Result Value Ref Range    Urine Culture >100,000 CFU/mL Escherichia coli (A)        Susceptibility    Escherichia coli - RONY     Ampicillin  Susceptible ug/ml     Ampicillin + Sulbactam  Susceptible ug/ml     Cefazolin  Susceptible ug/ml     Cefepime  Susceptible ug/ml     Ceftazidime  Susceptible ug/ml     Ceftriaxone  Susceptible ug/ml     Gentamicin  Susceptible ug/ml     Levofloxacin  Susceptible ug/ml     Nitrofurantoin  Susceptible ug/ml     Piperacillin + Tazobactam  Susceptible ug/ml     Trimethoprim + Sulfamethoxazole  Susceptible ug/ml   Results for orders placed or performed during the hospital encounter of 03/03/22   Tissue Pathology Exam    Specimen: A: Small Intestine, Duodenum; Tissue    B: Gastric, Antrum; Tissue    C: Esophagus; Tissue    D: Large Intestine, Transverse Colon; Tissue    E: Large Intestine, Cecum; Tissue    F: Large Intestine, Sigmoid Colon; Tissue    G: Large Intestine, Right / Ascending Colon; Tissue   Result Value Ref Range    Case Report       Surgical Pathology Report                         Case: [id number]                                  Authorizing Provider:  Ashlyn King MD      Collected:           03/03/2022 03:13 PM          Ordering Location:     [facility]   Received:            03/04/2022 07:03 AM                                 [facility]                                                     Pathologist:           Cassius Lord MD                                                           Specimens:   1) - Small Intestine, Duodenum, JA                                                                  2) - Gastric,  Antrum, JA                                                                            3) - Esophagus, EGJ   JA                                                                            4) - Large Intestine, Transverse Colon, polyps   JA                                                 5) - Large Intestine, Cecum, polyps   JA                                                             6) - Large Intestine, Sigmoid Colon, polyps   JA                                                    7) - Large Intestine, Right / Ascending Colon, polyps   JA                                 Final Diagnosis       See Scanned Report         *Note: Due to a large number of results and/or encounters for the requested time period, some results have not been displayed. A complete set of results can be found in Results Review.

## 2023-03-01 NOTE — H&P (VIEW-ONLY)
Chief Complaint   Patient presents with   • Heartburn   • Diverticulosis     Recall Letter       Subjective    Marielle Hansen is a 72 y.o. female. she is here today for follow-up.                                                                  Assessment & Plan                                     1. Encounter for colonoscopy due to history of adenomatous colonic polyps    2. Milligan's esophagus without dysplasia      Plan; schedule patient for EGD for surveillance of Milligan's esophagus.  Colonoscopy surveillance of multiple adenomatous colonic polyps removed March 2022    Follow-up: Return in about 4 weeks (around 3/29/2023) for Recheck, After test.     HPI    72-year-old female presents for follow-up to plan EGD and colonoscopy.  Reports she received recall letter several months ago but had orthopedic surgery and could not follow-up until now.  States bowel movements are still the same alternating between diarrhea and constipation.  Reports she has not remember to take her fiber recently.  She denies any melena or hematochezia.  Takes a probiotic has been following a new diet with her daughter that is gluten and dairy free and cut out all processed foods.  States she has lost 14 pounds since December and is doing very well.  Her previous EGD was completed March 2022 noted hiatal hernia grade 2 esophagitis and normal duodenum.  GE junction biopsy noted Milligan's mucosa negative for dysplasia.  Colonoscopy noted 15 mm polyp in the cecum and multiple sessile polyps in the sigmoid, transverse and ascending colon and cecum diverticulosis was also seen in the sigmoid repeat was recommended in 6 months for review polypectomy site however patient failed to follow-up at that time.    Review of Systems  Review of Systems   Constitutional: Negative for activity change, appetite change, chills,  "diaphoresis, fatigue, fever and unexpected weight change.   HENT: Negative for sore throat and trouble swallowing.    Respiratory: Negative for shortness of breath.    Gastrointestinal: Positive for constipation and diarrhea. Negative for abdominal distention, abdominal pain, anal bleeding, blood in stool, nausea, rectal pain and vomiting.        Bowel habits still go back and forth has not been taking fiber    Musculoskeletal: Negative for arthralgias.   Skin: Negative for pallor.   Neurological: Negative for light-headedness.       /74   Pulse 68   Ht 170.2 cm (67\")   Wt 98.2 kg (216 lb 6.4 oz)   BMI 33.89 kg/m²     Objective      Physical Exam  Constitutional:       General: She is not in acute distress.     Appearance: Normal appearance. She is normal weight. She is not ill-appearing.   HENT:      Head: Normocephalic and atraumatic.   Pulmonary:      Effort: Pulmonary effort is normal.   Abdominal:      General: Abdomen is flat. Bowel sounds are normal. There is no distension.      Palpations: Abdomen is soft. There is no mass.      Tenderness: There is no abdominal tenderness.   Neurological:      Mental Status: She is alert.               The following portions of the patient's history were reviewed and updated as appropriate:   Past Medical History:   Diagnosis Date   • Arthritis    • CKD (chronic kidney disease)    • Disease of thyroid gland    • Diverticulitis    • GERD (gastroesophageal reflux disease)    • Hiatal hernia    • Hyperlipidemia    • Hypertension    • Stroke (HCC)    • TIA (transient ischemic attack)      Past Surgical History:   Procedure Laterality Date   • APPENDECTOMY     • CHOLECYSTECTOMY     • COLONOSCOPY N/A 01/30/2020    Procedure: COLONOSCOPY;  Surgeon: Ashlyn King MD;  Location: [facility] ENDOSCOPY;  Service: Gastroenterology   • COLONOSCOPY N/A 03/03/2022    Procedure: COLONOSCOPY;  Surgeon: Ashlyn King MD;  Location: [facility] ENDOSCOPY;  Service: Gastroenterology; "  Laterality: N/A;   • CYSTOSCOPY, URETEROSCOPY, RETROGRADE PYELOGRAM, STENT INSERTION Left 2020    Procedure: CYSTOSCOPY LEFT URETEROSCOPY RETROGRADE PYELOGRAM STENT INSERTION;  Surgeon: Johnny Chino MD;  Location: [facility] OR;  Service: Urology;  Laterality: Left;   • ENDOSCOPY N/A 2020    Procedure: ESOPHAGOGASTRODUODENOSCOPY;  Surgeon: Ashlyn King MD;  Location: [facility] ENDOSCOPY;  Service: Gastroenterology   • ENDOSCOPY N/A 2022    Procedure: ESOPHAGOGASTRODUODENOSCOPY;  Surgeon: Ashlyn King MD;  Location: [facility] ENDOSCOPY;  Service: Gastroenterology;  Laterality: N/A;   • EXTRACORPOREAL SHOCKWAVE LITHOTRIPSY (ESWL), STENT INSERTION/REMOVAL Left 2020    Procedure: LEFT EXTRACORPOREAL SHOCKWAVE LITHOTRIPSY;  Surgeon: Johnny Chino MD;  Location: [facility] OR;  Service: Urology;  Laterality: Left;   • HYSTERECTOMY     • TOTAL HIP ARTHROPLASTY Left 2022    Procedure: LEFT TOTAL HIP ARTHROPLASTY ANTERIOR;  Surgeon: Johnny Rutherford MD;  Location: [facility];  Service: Orthopedics;  Laterality: Left;   • TOTAL KNEE ARTHROPLASTY Bilateral      History reviewed. No pertinent family history.  OB History    No obstetric history on file.       Allergies   Allergen Reactions   • Codeine Hives   • Famotidine GI Intolerance     Social History     Socioeconomic History   • Marital status:    Tobacco Use   • Smoking status: Former     Types: Cigarettes     Quit date:      Years since quittin.1   • Smokeless tobacco: Never   Vaping Use   • Vaping Use: Never used   Substance and Sexual Activity   • Alcohol use: No   • Drug use: Never   • Sexual activity: Defer     Current Medications:  Prior to Admission medications    Medication Sig Start Date End Date Taking? Authorizing Provider   albuterol sulfate  (90 Base) MCG/ACT inhaler Inhale 2 puffs Every 4 (Four) Hours As Needed for Wheezing. 20  Yes Yoselin Rubio MD   amLODIPine (NORVASC) 5 MG tablet  "TAKE 1 TABLET BY MOUTH EVERY DAY 1/16/23  Yes Yoselin Rubio MD   cyanocobalamin 1000 MCG/ML injection INJECT 1 ML INTRAMUSCULARLY ONCE EVERY 30 DAYS 2/3/23  Yes Yoselin Rubio MD   D3-50 1.25 MG (54321 UT) capsule TAKE 1 CAPSULE BY MOUTH EVERY MONDAY FRIDAY 10/17/22  Yes Yoselin Rubio MD   DRYSOL 20 % external solution Apply 1 each topically to the appropriate area as directed As Needed. 2/6/20  Yes Hilario Jenkins MD   escitalopram (LEXAPRO) 10 MG tablet TAKE 1 TABLET BY MOUTH EVERY NIGHT AT BEDTIME per pt 1/13/23  Yes Yoselin Rubio MD   HYDROcodone-acetaminophen (NORCO) 7.5-325 MG per tablet Take 1 tablet by mouth Every 6 (Six) Hours As Needed for Moderate Pain. 2/13/23  Yes Yoselin Rubio MD   levothyroxine (SYNTHROID, LEVOTHROID) 137 MCG tablet TAKE 1 TABLET BY MOUTH EVERY DAY 1/16/23  Yes Yoselin Rubio MD   lovastatin (MEVACOR) 40 MG tablet TAKE 1 TABLET BY MOUTH EVERY EVENING 1/16/23  Yes Yoselin Rubio MD   metoprolol succinate XL (TOPROL-XL) 100 MG 24 hr tablet TAKE 1 TABLET BY MOUTH EVERY DAY 1/16/23  Yes Yoselin Rubio MD   Mirabegron ER (Myrbetriq) 25 MG tablet sustained-release 24 hour 24 hr tablet Take 1 tablet by mouth Daily. 12/5/22  Yes Yoselin Rubio MD   olmesartan-hydrochlorothiazide (BENICAR HCT) 40-12.5 MG per tablet TAKE 1 TABLET BY MOUTH EVERY DAY 1/13/23  Yes Yoselin Rubio MD   ondansetron ODT (Zofran ODT) 4 MG disintegrating tablet Place 1 tablet on the tongue Every 8 (Eight) Hours As Needed for Nausea or Vomiting. 7/20/22  Yes Yoselin Rubio MD   traZODone (DESYREL) 150 MG tablet TAKE 2 TABLETS BY MOUTH EVERY NIGHT AT BEDTIME 10/17/22  Yes Yoselin Rubio MD   VanishPoint Safety Syringe 25G X 1\" 3 ML misc USE AS DIRECTED with b12 2/3/23  Yes Yoselin Rubio MD     Orders placed during this encounter include:  Orders Placed This Encounter   Procedures   • Obtain Informed Consent     Standing Status:   Future     Order Specific Question:   " Informed Consent Given For     Answer:   egd and colonoscopy     ESOPHAGOGASTRODUODENOSCOPY (N/A), COLONOSCOPY (N/A)  New Medications Ordered This Visit   Medications   • PEG-KCl-NaCl-NaSulf-Na Asc-C (MoviPrep) 100 g reconstituted solution powder     Sig: Take 1,000 mL by mouth Every 12 (Twelve) Hours.     Dispense:  2000 mL     Refill:  0         Review and/or summary of lab tests, radiology, procedures, medications. Review and summary of old records and obtaining of history. The risks and benefits of my recommendations, as well as other treatment options were discussed . Any questions/concerned were answered. Patient voiced understanding and agreement.          This document has been electronically signed by BIBI Zazueta on March 1, 2023 11:10 CST                                               Results for orders placed or performed in visit on 12/05/22   ToxASSURE Select 13 (MW) - Urine, Clean Catch    Specimen: Urine, Clean Catch   Result Value Ref Range    Report Summary FINAL    Urinalysis With Culture If Indicated - Urine, Clean Catch    Specimen: Urine, Clean Catch   Result Value Ref Range    Color, UA Yellow Yellow, Straw    Appearance, UA Slightly Cloudy (A) Clear    pH, UA 5.5 5.5 - 8.0    Specific Gravity, UA 1.010 1.005 - 1.030    Glucose, UA Negative Negative    Ketones, UA Negative Negative    Bilirubin, UA Negative Negative    Blood, UA Negative Negative    Protein, UA Negative Negative    Leuk Esterase, UA Negative Negative    Nitrite, UA Negative Negative    Urobilinogen, UA 0.2 E.U./dL 0.2 - 1.0 E.U./dL   Results for orders placed or performed during the hospital encounter of 09/26/22   PREVIOUS HISTORY    Specimen: Blood   Result Value Ref Range    Previous History Previous Record on File    TISSUE EXAM, [facility] ([facility])    Specimen: Hip, Left; Tissue   Result Value Ref Range    Reference Lab Report       [facility]  [address] [place]   [address]  Phone: [phone] or [phone]  Fax: [phone]  Ronnell Santiago M.D., Medical Director    PATIENT NAME                           LABORATORY NO.  HARRY ALEXIS.                [id number]  [id number]                         AGE              SEX  SSN           CLIENT REF #  [facility]           72      1950  F    [id number]   [id number]    [place]                       REQUESTING M.D.     ATTENDING MJose AntonioD.     COPY TO[address]                 [place] [address]             DATE COLLECTED      DATE RECEIVED      DATE REPORTED  2022    DIAGNOSIS:  FEMORAL HEAD, GROSS ONLY, LEFT:  GROSS DIAGNOSIS:  Consistent with osteoarthritis    BLAKE/sm    CLINICAL HISTORY:  Primary osteoarthritis of left hip, spinal stenosis of lumbar region without  neurogenic claudication,  essential hypertension, primary osteoarthritis of right  hip, mild intermittent asthma without complication    SPECIMENS RECEIVED:  FEMORAL HEAD, GROSS ONLY, LEFT    Professional interpretation rendered by Vineet Gutierrez M.D., F.C.A.P. at [facility] [email or web address], [address] [place] [address].    GROSS DESCRIPTION:  Received in formalin labeled femoral head left hip is a 4.7 x 4.5 x 4 cm femoral  head with an attached 4 x 2.7 x 1 cm portion of femoral neck.  Also received  freely floating in the container is a 2 cm aggregate of bone fragments.  The  articular surface is diffusely eroded and eburnated.  The cut surface is firm,  brown and trabecular.  Moderate osteophyte formation is present.  No gross  lesions or areas of softening are identified.  No sections are submitted.  Gross  diagnosis only.  LDP    REVIEWED, DIAGNOSED AND ELECTRONICALLY  SIGNED BY:    Vineet Gutierrez M.D., F.C.A.P.  CPT CODES:  58360     Hemoglobin & Hematocrit, Blood    Specimen: Blood   Result Value Ref Range    Hemoglobin 11.6 (L)  12.0 - 15.9 g/dL    Hematocrit 35.2 34.0 - 46.6 %   Type & Screen    Specimen: Blood   Result Value Ref Range    ABO Type O     RH type Positive     Antibody Screen Negative     T&S Expiration Date 9/29/2022 11:59:59 PM    Results for orders placed or performed in visit on 09/20/22   PREVIOUS HISTORY    Specimen: Blood   Result Value Ref Range    Previous History Patient needs ABO/RH on day of surgery.    MRSA Screen, PCR (Inpatient) - Swab, Nares    Specimen: Nares; Swab   Result Value Ref Range    MRSA, PCR Negative Negative   Type and screen    Specimen: Blood   Result Value Ref Range    ABO Type O     RH type Positive     Antibody Screen Negative     T&S Expiration Date 9/23/2022 11:59:59 PM    ECG 12 Lead   Result Value Ref Range    QT Interval 444 ms    QTC Interval 428 ms   Results for orders placed or performed in visit on 09/12/22   CBC Auto Differential    Specimen: Blood   Result Value Ref Range    WBC 10.16 3.40 - 10.80 10*3/mm3    RBC 4.37 3.77 - 5.28 10*6/mm3    Hemoglobin 12.8 12.0 - 15.9 g/dL    Hematocrit 39.7 34.0 - 46.6 %    MCV 90.8 79.0 - 97.0 fL    MCH 29.3 26.6 - 33.0 pg    MCHC 32.2 31.5 - 35.7 g/dL    RDW 13.8 12.3 - 15.4 %    RDW-SD 44.7 37.0 - 54.0 fl    MPV 9.5 6.0 - 12.0 fL    Platelets 224 140 - 450 10*3/mm3    Neutrophil % 65.5 42.7 - 76.0 %    Lymphocyte % 25.4 19.6 - 45.3 %    Monocyte % 6.6 5.0 - 12.0 %    Eosinophil % 1.8 0.3 - 6.2 %    Basophil % 0.7 0.0 - 1.5 %    Neutrophils, Absolute 6.66 1.70 - 7.00 10*3/mm3    Lymphocytes, Absolute 2.58 0.70 - 3.10 10*3/mm3    Monocytes, Absolute 0.67 0.10 - 0.90 10*3/mm3    Eosinophils, Absolute 0.18 0.00 - 0.40 10*3/mm3    Basophils, Absolute 0.07 0.00 - 0.20 10*3/mm3   Lipid Panel    Specimen: Blood   Result Value Ref Range    Total Cholesterol 141 (L) 150 - 200 mg/dL    Triglycerides 278 (H) <=150 mg/dL    HDL Cholesterol 30 (L) 40 - 59 mg/dL    LDL Cholesterol  66 <=100 mg/dL    VLDL Cholesterol 45 (H) 5 - 40 mg/dL    LDL/HDL Ratio 1.85  0.00 - 3.22   Results for orders placed or performed in visit on 08/09/22   Vitamin B12 & Folate    Specimen: Blood   Result Value Ref Range    Folate 14.90 4.78 - 24.20 ng/mL    Vitamin B-12 1,007 (H) 211 - 946 pg/mL   Vitamin D 25 Hydroxy    Specimen: Blood   Result Value Ref Range    25 Hydroxy, Vitamin D 146.0 (H) 30.0 - 100.0 ng/ml   TSH    Specimen: Blood   Result Value Ref Range    TSH 1.010 0.270 - 4.200 uIU/mL   T4, Free    Specimen: Blood   Result Value Ref Range    Free T4 1.19 0.93 - 1.70 ng/dL   Comprehensive Metabolic Panel    Specimen: Blood   Result Value Ref Range    Glucose 135 (H) 70 - 99 mg/dL    BUN 21 7 - 23 mg/dL    Creatinine 1.28 (H) 0.52 - 1.04 mg/dL    Sodium 138 137 - 145 mmol/L    Potassium 4.4 3.4 - 5.0 mmol/L    Chloride 102 101 - 112 mmol/L    CO2 27.0 22.0 - 30.0 mmol/L    Calcium 9.4 8.4 - 10.2 mg/dL    Total Protein 7.5 6.3 - 8.6 g/dL    Albumin 4.40 3.50 - 5.00 g/dL    ALT (SGPT) 17 <=35 U/L    AST (SGOT) 21 14 - 36 U/L    Alkaline Phosphatase 48 38 - 126 U/L    Total Bilirubin 0.4 0.2 - 1.3 mg/dL    Globulin 3.1 2.3 - 3.5 gm/dL    A/G Ratio 1.4 1.1 - 1.8 g/dL    BUN/Creatinine Ratio 16.4 7.0 - 25.0    Anion Gap 9.0 5.0 - 15.0 mmol/L    eGFR 44.6 (L) >60.0 mL/min/1.73   Results for orders placed or performed in visit on 07/21/22   Urinalysis, Microscopic Only - Urine, Clean Catch    Specimen: Urine, Clean Catch   Result Value Ref Range    RBC, UA 0-2 (A) None Seen /HPF    WBC, UA 13-20 (A) None Seen /HPF    Bacteria, UA 3+ (A) None Seen /HPF    Squamous Epithelial Cells, UA 3-6 (A) None Seen, 0-2 /HPF    Hyaline Casts, UA None Seen None Seen /LPF    Methodology Manual Light Microscopy    Urinalysis With Culture If Indicated - Urine, Clean Catch    Specimen: Urine, Clean Catch   Result Value Ref Range    Color, UA Yellow Yellow, Straw    Appearance, UA Cloudy (A) Clear    pH, UA 5.5 5.5 - 8.0    Specific Gravity, UA 1.020 1.005 - 1.030    Glucose, UA Negative Negative    Ketones,  UA Negative Negative    Bilirubin, UA Negative Negative    Blood, UA Negative Negative    Protein, UA Negative Negative    Leuk Esterase, UA Trace (A) Negative    Nitrite, UA Positive (A) Negative    Urobilinogen, UA 0.2 E.U./dL 0.2 - 1.0 E.U./dL   Urine Culture - Urine, Urine, Clean Catch    Specimen: Urine, Clean Catch   Result Value Ref Range    Urine Culture >100,000 CFU/mL Escherichia coli (A)        Susceptibility    Escherichia coli - RONY     Ampicillin  Susceptible ug/ml     Ampicillin + Sulbactam  Susceptible ug/ml     Cefazolin  Susceptible ug/ml     Cefepime  Susceptible ug/ml     Ceftazidime  Susceptible ug/ml     Ceftriaxone  Susceptible ug/ml     Gentamicin  Susceptible ug/ml     Levofloxacin  Susceptible ug/ml     Nitrofurantoin  Susceptible ug/ml     Piperacillin + Tazobactam  Susceptible ug/ml     Trimethoprim + Sulfamethoxazole  Susceptible ug/ml   Results for orders placed or performed during the hospital encounter of 03/03/22   Tissue Pathology Exam    Specimen: A: Small Intestine, Duodenum; Tissue    B: Gastric, Antrum; Tissue    C: Esophagus; Tissue    D: Large Intestine, Transverse Colon; Tissue    E: Large Intestine, Cecum; Tissue    F: Large Intestine, Sigmoid Colon; Tissue    G: Large Intestine, Right / Ascending Colon; Tissue   Result Value Ref Range    Case Report       Surgical Pathology Report                         Case: [id number]                                  Authorizing Provider:  Ashlyn King MD      Collected:           03/03/2022 03:13 PM          Ordering Location:     [facility]   Received:            03/04/2022 07:03 AM                                 [facility]                                                     Pathologist:           Cassius Lord MD                                                           Specimens:   1) - Small Intestine, Duodenum, JA                                                                  2) - Gastric,  Antrum, JA                                                                            3) - Esophagus, EGJ   JA                                                                            4) - Large Intestine, Transverse Colon, polyps   JA                                                 5) - Large Intestine, Cecum, polyps   JA                                                             6) - Large Intestine, Sigmoid Colon, polyps   JA                                                    7) - Large Intestine, Right / Ascending Colon, polyps   JA                                 Final Diagnosis       See Scanned Report         *Note: Due to a large number of results and/or encounters for the requested time period, some results have not been displayed. A complete set of results can be found in Results Review.

## 2023-03-03 ENCOUNTER — PRIOR AUTHORIZATION (OUTPATIENT)
Dept: GASTROENTEROLOGY | Facility: CLINIC | Age: 73
End: 2023-03-03
Payer: MEDICARE

## 2023-03-03 RX ORDER — SODIUM PICOSULFATE, MAGNESIUM OXIDE, AND ANHYDROUS CITRIC ACID 10; 3.5; 12 MG/160ML; G/160ML; G/160ML
160 LIQUID ORAL SEE ADMIN INSTRUCTIONS
Qty: 320 ML | Refills: 0 | Status: SHIPPED | OUTPATIENT
Start: 2023-03-03 | End: 2023-03-29

## 2023-03-07 ENCOUNTER — TELEMEDICINE (OUTPATIENT)
Dept: FAMILY MEDICINE CLINIC | Facility: CLINIC | Age: 73
End: 2023-03-07
Payer: MEDICARE

## 2023-03-07 VITALS — HEIGHT: 67 IN | BODY MASS INDEX: 33.43 KG/M2 | WEIGHT: 213 LBS

## 2023-03-07 DIAGNOSIS — Z00.00 ENCOUNTER FOR SUBSEQUENT ANNUAL WELLNESS VISIT (AWV) IN MEDICARE PATIENT: Primary | ICD-10-CM

## 2023-03-07 PROCEDURE — 1170F FXNL STATUS ASSESSED: CPT | Performed by: PHYSICIAN ASSISTANT

## 2023-03-07 PROCEDURE — 1159F MED LIST DOCD IN RCRD: CPT | Performed by: PHYSICIAN ASSISTANT

## 2023-03-07 PROCEDURE — 1125F AMNT PAIN NOTED PAIN PRSNT: CPT | Performed by: PHYSICIAN ASSISTANT

## 2023-03-07 PROCEDURE — 1160F RVW MEDS BY RX/DR IN RCRD: CPT | Performed by: PHYSICIAN ASSISTANT

## 2023-03-07 PROCEDURE — 1126F AMNT PAIN NOTED NONE PRSNT: CPT | Performed by: PHYSICIAN ASSISTANT

## 2023-03-07 PROCEDURE — G0439 PPPS, SUBSEQ VISIT: HCPCS | Performed by: PHYSICIAN ASSISTANT

## 2023-03-07 RX ORDER — POLYETHYLENE GLYCOL 3350, SODIUM SULFATE, SODIUM CHLORIDE, POTASSIUM CHLORIDE, SODIUM ASCORBATE, AND ASCORBIC ACID 7.5-2.691G
KIT ORAL
COMMUNITY
Start: 2023-03-03 | End: 2023-03-29

## 2023-03-07 NOTE — PROGRESS NOTES
The ABCs of the Annual Wellness Visit  Subsequent Medicare Wellness Visit    You have chosen to receive care through a telephone visit. Do you consent to use a telephone visit for your medical care today? Yes This visit has been rescheduled as a phone visit to comply with patient safety concerns in accordance with [facility] recommendations. Total time of discussion was 15 minutes. Patient is at her home and I am at my office for the duration of today's visit.    Manny Hansen is a 72 y.o. female who presents for a Subsequent Medicare Wellness Visit via telephone encounter. PCP, Dr. Rubio.    The following portions of the patient's history were reviewed and   updated as appropriate: allergies, current medications, past family history, past medical history, past social history, past surgical history and problem list.    Compared to one year ago, the patient feels her physical   health is better.    Compared to one year ago, the patient feels her mental   health is the same.    Recent Hospitalizations:  She was admitted within the past 365 days at [facility].       Current Medical Providers:  Patient Care Team:  Yoselin Rubio MD as PCP - General (Family Medicine)    Outpatient Medications Prior to Visit   Medication Sig Dispense Refill   • albuterol sulfate  (90 Base) MCG/ACT inhaler Inhale 2 puffs Every 4 (Four) Hours As Needed for Wheezing. 18 g 5   • amLODIPine (NORVASC) 5 MG tablet TAKE 1 TABLET BY MOUTH EVERY DAY 90 tablet 2   • Clenpiq 10-3.5-12 MG-GM -GM/160ML solution Take 160 mL by mouth See Admin Instructions. 320 mL 0   • cyanocobalamin 1000 MCG/ML injection INJECT 1 ML INTRAMUSCULARLY ONCE EVERY 30 DAYS 3 mL 0   • D3-50 1.25 MG (21520 UT) capsule TAKE 1 CAPSULE BY MOUTH EVERY MONDAY FRIDAY 8 capsule 5   • DRYSOL 20 % external solution Apply 1 each topically to the appropriate area as directed As Needed.     • escitalopram (LEXAPRO) 10 MG tablet TAKE 1 TABLET BY MOUTH EVERY  "NIGHT AT BEDTIME per pt 90 tablet 0   • HYDROcodone-acetaminophen (NORCO) 7.5-325 MG per tablet Take 1 tablet by mouth Every 6 (Six) Hours As Needed for Moderate Pain. 120 tablet 0   • levothyroxine (SYNTHROID, LEVOTHROID) 137 MCG tablet TAKE 1 TABLET BY MOUTH EVERY DAY 90 tablet 2   • lovastatin (MEVACOR) 40 MG tablet TAKE 1 TABLET BY MOUTH EVERY EVENING 90 tablet 2   • metoprolol succinate XL (TOPROL-XL) 100 MG 24 hr tablet TAKE 1 TABLET BY MOUTH EVERY DAY 90 tablet 2   • Mirabegron ER (Myrbetriq) 25 MG tablet sustained-release 24 hour 24 hr tablet Take 1 tablet by mouth Daily. 30 tablet 5   • olmesartan-hydrochlorothiazide (BENICAR HCT) 40-12.5 MG per tablet TAKE 1 TABLET BY MOUTH EVERY DAY 90 tablet 0   • ondansetron ODT (Zofran ODT) 4 MG disintegrating tablet Place 1 tablet on the tongue Every 8 (Eight) Hours As Needed for Nausea or Vomiting. 30 tablet 5   • PEG-3350/Electrolytes/Ascorbat 100 g reconstituted solution powder      • traZODone (DESYREL) 150 MG tablet TAKE 2 TABLETS BY MOUTH EVERY NIGHT AT BEDTIME 60 tablet 5   • VanishPoint Safety Syringe 25G X 1\" 3 ML misc USE AS DIRECTED with b12 3 each 0     No facility-administered medications prior to visit.       Opioid medication/s are on active medication list.  and I have evaluated her active treatment plan and pain score trends (see table).  Vitals:    03/07/23 1319   PainSc:   8   PainLoc: Back     I have reviewed the chart for potential of high risk medication and harmful drug interactions in the elderly.            Aspirin is not on active medication list.  Aspirin use is not indicated based on review of current medical condition/s. Risk of harm outweighs potential benefits.  .    Patient Active Problem List   Diagnosis   • Essential hypertension   • Hyperlipidemia   • Hypothyroidism   • Right hip pain   • Current moderate episode of major depressive disorder without prior episode (HCC)   • Vitamin D deficiency   • Vitamin B12 deficiency   • Spinal " "stenosis of lumbar region   • Low back pain   • Primary osteoarthritis of right hip   • Obesity (BMI 30.0-34.9)   • Epigastric pain   • Nausea and vomiting   • Diarrhea   • Rectal bleeding   • Bloating   • Insomnia   • Calculus of kidney   • CARMEN (acute kidney injury) (HCC)   • Ureteral stone with hydronephrosis   • Carpal tunnel syndrome   • Left hip pain   • Change in bowel habit   • Mucus in stool   • Personal history of colonic polyps   • Generalized abdominal pain   • Primary osteoarthritis of left hip   • Mild intermittent asthma without complication   • Acute pain of left knee   • Milligan's esophagus without dysplasia     Advance Care Planning  Advance Directive is not on file.  ACP discussion was held with the patient during this visit. Patient does not have an advance directive, information provided.     Objective    Vitals:    23 1319   Weight: 96.6 kg (213 lb)   Height: 170.2 cm (67\")   PainSc:   8   PainLoc: Back     Estimated body mass index is 33.36 kg/m² as calculated from the following:    Height as of this encounter: 170.2 cm (67\").    Weight as of this encounter: 96.6 kg (213 lb).    BMI is >= 30 and <35. (Class 1 Obesity). The following options were offered after discussion;: exercise counseling/recommendations and nutrition counseling/recommendations      Does the patient have evidence of cognitive impairment?   No            HEALTH RISK ASSESSMENT    Smoking Status:  Social History     Tobacco Use   Smoking Status Former   • Types: Cigarettes   • Quit date:    • Years since quittin.1   Smokeless Tobacco Never     Alcohol Consumption:  Social History     Substance and Sexual Activity   Alcohol Use No     Fall Risk Screen:    STEADI Fall Risk Assessment was completed, and patient is at LOW risk for falls.Assessment completed on:3/7/2023    Depression Screening:  PHQ-2/PHQ-9 Depression Screening 3/7/2023   Little Interest or Pleasure in Doing Things 0-->not at all   Feeling Down, " Depressed or Hopeless 0-->not at all   PHQ-9: Brief Depression Severity Measure Score 0       Health Habits and Functional and Cognitive Screening:  Functional & Cognitive Status 3/7/2023   Do you have difficulty preparing food and eating? No   Do you have difficulty bathing yourself, getting dressed or grooming yourself? No   Do you have difficulty using the toilet? No   Do you have difficulty moving around from place to place? No   Do you have trouble with steps or getting out of a bed or a chair? No   Current Diet Well Balanced Diet   Dental Exam Up to date   Eye Exam Up to date   Exercise (times per week) 3 times per week   Current Exercises Include House Cleaning;Walking   Do you need help using the phone?  No   Are you deaf or do you have serious difficulty hearing?  No   Do you need help with transportation? No   Do you need help shopping? No   Do you need help preparing meals?  No   Do you need help with housework?  No   Do you need help with laundry? No   Do you need help taking your medications? No   Do you need help managing money? No   Do you ever drive or ride in a car without wearing a seat belt? No   Have you felt unusual stress, anger or loneliness in the last month? No   Who do you live with? Alone   If you need help, do you have trouble finding someone available to you? No   Have you been bothered in the last four weeks by sexual problems? No   Do you have difficulty concentrating, remembering or making decisions? Yes       Age-appropriate Screening Schedule:  Refer to the list below for future screening recommendations based on patient's age, sex and/or medical conditions. Orders for these recommended tests are listed in the plan section. The patient has been provided with a written plan.    Health Maintenance   Topic Date Due   • MAMMOGRAM  Never done   • DXA SCAN  Never done   • Pneumococcal Vaccine 65+ (1 - PCV) Never done   • ZOSTER VACCINE (1 of 2) Never done   • TDAP/TD VACCINES (2 - Tdap)  06/19/2010   • HEPATITIS C SCREENING  Never done   • COVID-19 Vaccine (4 - Booster for Pfizer series) 12/29/2021   • INFLUENZA VACCINE  08/01/2022   • COLORECTAL CANCER SCREENING  09/03/2022   • GASTROSCOPY (EGD)  03/03/2023   • LIPID PANEL  09/12/2023   • ANNUAL WELLNESS VISIT  03/07/2024                CMS Preventative Services Quick Reference  Risk Factors Identified During Encounter:    Chronic Pain: Natural history and expected course discussed. Questions answered.  Chronic Pain Educational material Discussed and Shared in After Visit Summary for Patient.  Immunizations Discussed/Encouraged: Tdap, Influenza, Prevnar 20 (Pneumococcal 20-valent conjugate), Shingrix and COVID19  Inactivity/Sedentary: Patient was advised to exercise at least 150 minutes a week per CDC recommendations.  Urinary Incontinence: Referred to Urology - patient already established with urology.    The above risks/problems have been discussed with the patient.  Pertinent information has been shared with the patient in the After Visit Summary.    Diagnoses and all orders for this visit:    1. Encounter for subsequent annual wellness visit (AWV) in Medicare patient (Primary)      Subsequent medicare annual visit completed.     Recommended immunizations as above.     Patient is due for screening mammogram, dexa, and routine labs. I have offered to order these for the patient. She stated she has an upcoming appt with her pcp, Dr. Finney on 3/22/23 and will mention this at that time to be ordered.     She will follow up with her pcp, Dr. Rubio, as scheduled or sooner PRN. Patient stated understanding and has agreed with plan of care.     This document has been electronically signed by Fernanda Molina PA-C on March 7, 2023 16:36 CST,.       Follow Up:   Next Medicare Wellness visit to be scheduled in 1 year.      An After Visit Summary and PPPS were made available to the patient.

## 2023-03-08 DIAGNOSIS — E55.9 VITAMIN D DEFICIENCY: ICD-10-CM

## 2023-03-08 DIAGNOSIS — Z12.31 ENCOUNTER FOR SCREENING MAMMOGRAM FOR BREAST CANCER: ICD-10-CM

## 2023-03-08 DIAGNOSIS — I10 ESSENTIAL HYPERTENSION: Primary | ICD-10-CM

## 2023-03-08 DIAGNOSIS — E53.8 VITAMIN B12 DEFICIENCY: ICD-10-CM

## 2023-03-08 DIAGNOSIS — Z78.0 POSTMENOPAUSAL STATUS (AGE-RELATED) (NATURAL): ICD-10-CM

## 2023-03-13 DIAGNOSIS — G89.29 CHRONIC BILATERAL LOW BACK PAIN WITH BILATERAL SCIATICA: ICD-10-CM

## 2023-03-13 DIAGNOSIS — M54.42 CHRONIC BILATERAL LOW BACK PAIN WITH BILATERAL SCIATICA: ICD-10-CM

## 2023-03-13 DIAGNOSIS — M54.41 CHRONIC BILATERAL LOW BACK PAIN WITH BILATERAL SCIATICA: ICD-10-CM

## 2023-03-13 RX ORDER — HYDROCODONE BITARTRATE AND ACETAMINOPHEN 7.5; 325 MG/1; MG/1
1 TABLET ORAL EVERY 6 HOURS PRN
Qty: 120 TABLET | Refills: 0 | Status: SHIPPED | OUTPATIENT
Start: 2023-03-13 | End: 2023-03-15 | Stop reason: SDUPTHER

## 2023-03-14 ENCOUNTER — TELEPHONE (OUTPATIENT)
Dept: FAMILY MEDICINE CLINIC | Facility: CLINIC | Age: 73
End: 2023-03-14
Payer: MEDICARE

## 2023-03-14 DIAGNOSIS — G89.29 CHRONIC BILATERAL LOW BACK PAIN WITH BILATERAL SCIATICA: ICD-10-CM

## 2023-03-14 DIAGNOSIS — M54.42 CHRONIC BILATERAL LOW BACK PAIN WITH BILATERAL SCIATICA: ICD-10-CM

## 2023-03-14 DIAGNOSIS — M54.41 CHRONIC BILATERAL LOW BACK PAIN WITH BILATERAL SCIATICA: ICD-10-CM

## 2023-03-14 RX ORDER — HYDROCODONE BITARTRATE AND ACETAMINOPHEN 7.5; 325 MG/1; MG/1
1 TABLET ORAL EVERY 6 HOURS PRN
Qty: 120 TABLET | Refills: 0 | OUTPATIENT
Start: 2023-03-14

## 2023-03-14 NOTE — TELEPHONE ENCOUNTER
Her script was sent to the wrong pharmacy.. it went to [facility] but it needs to go to the pharmacy side.. [facility] in [place]

## 2023-03-15 DIAGNOSIS — M54.41 CHRONIC BILATERAL LOW BACK PAIN WITH BILATERAL SCIATICA: ICD-10-CM

## 2023-03-15 DIAGNOSIS — G89.29 CHRONIC BILATERAL LOW BACK PAIN WITH BILATERAL SCIATICA: ICD-10-CM

## 2023-03-15 DIAGNOSIS — M54.42 CHRONIC BILATERAL LOW BACK PAIN WITH BILATERAL SCIATICA: ICD-10-CM

## 2023-03-15 RX ORDER — HYDROCODONE BITARTRATE AND ACETAMINOPHEN 7.5; 325 MG/1; MG/1
1 TABLET ORAL EVERY 6 HOURS PRN
Qty: 120 TABLET | Refills: 0 | Status: SHIPPED | OUTPATIENT
Start: 2023-03-15 | End: 2023-03-23 | Stop reason: SDUPTHER

## 2023-03-17 ENCOUNTER — ANESTHESIA EVENT (OUTPATIENT)
Dept: GASTROENTEROLOGY | Facility: HOSPITAL | Age: 73
End: 2023-03-17
Payer: MEDICARE

## 2023-03-17 ENCOUNTER — ANESTHESIA (OUTPATIENT)
Dept: GASTROENTEROLOGY | Facility: HOSPITAL | Age: 73
End: 2023-03-17
Payer: MEDICARE

## 2023-03-17 ENCOUNTER — HOSPITAL ENCOUNTER (OUTPATIENT)
Facility: HOSPITAL | Age: 73
Setting detail: HOSPITAL OUTPATIENT SURGERY
Discharge: HOME | End: 2023-03-17
Attending: INTERNAL MEDICINE | Admitting: INTERNAL MEDICINE
Payer: MEDICARE

## 2023-03-17 VITALS
BODY MASS INDEX: 34.05 KG/M2 | HEIGHT: 66 IN | OXYGEN SATURATION: 96 % | WEIGHT: 211.86 LBS | HEART RATE: 79 BPM | TEMPERATURE: 97.3 F | DIASTOLIC BLOOD PRESSURE: 69 MMHG | SYSTOLIC BLOOD PRESSURE: 128 MMHG | RESPIRATION RATE: 18 BRPM

## 2023-03-17 DIAGNOSIS — Z86.010 ENCOUNTER FOR COLONOSCOPY DUE TO HISTORY OF ADENOMATOUS COLONIC POLYPS: ICD-10-CM

## 2023-03-17 DIAGNOSIS — K22.70 BARRETT'S ESOPHAGUS WITHOUT DYSPLASIA: ICD-10-CM

## 2023-03-17 DIAGNOSIS — Z12.11 ENCOUNTER FOR COLONOSCOPY DUE TO HISTORY OF ADENOMATOUS COLONIC POLYPS: ICD-10-CM

## 2023-03-17 PROCEDURE — 88305 TISSUE EXAM BY PATHOLOGIST: CPT

## 2023-03-17 PROCEDURE — 25010000002 PROPOFOL 10 MG/ML EMULSION: Performed by: NURSE ANESTHETIST, CERTIFIED REGISTERED

## 2023-03-17 PROCEDURE — 43239 EGD BIOPSY SINGLE/MULTIPLE: CPT | Performed by: INTERNAL MEDICINE

## 2023-03-17 PROCEDURE — 45385 COLONOSCOPY W/LESION REMOVAL: CPT | Performed by: INTERNAL MEDICINE

## 2023-03-17 RX ORDER — LIDOCAINE HYDROCHLORIDE 20 MG/ML
INJECTION, SOLUTION INFILTRATION; PERINEURAL AS NEEDED
Status: DISCONTINUED | OUTPATIENT
Start: 2023-03-17 | End: 2023-03-17 | Stop reason: SURG

## 2023-03-17 RX ORDER — DEXTROSE AND SODIUM CHLORIDE 5; .45 G/100ML; G/100ML
30 INJECTION, SOLUTION INTRAVENOUS CONTINUOUS PRN
Status: DISCONTINUED | OUTPATIENT
Start: 2023-03-17 | End: 2023-03-17 | Stop reason: HOSPADM

## 2023-03-17 RX ORDER — SODIUM CHLORIDE 9 MG/ML
40 INJECTION, SOLUTION INTRAVENOUS AS NEEDED
Status: DISCONTINUED | OUTPATIENT
Start: 2023-03-17 | End: 2023-03-17 | Stop reason: HOSPADM

## 2023-03-17 RX ORDER — PROPOFOL 10 MG/ML
VIAL (ML) INTRAVENOUS AS NEEDED
Status: DISCONTINUED | OUTPATIENT
Start: 2023-03-17 | End: 2023-03-17 | Stop reason: SURG

## 2023-03-17 RX ADMIN — PROPOFOL 20 MG: 10 INJECTION, EMULSION INTRAVENOUS at 16:00

## 2023-03-17 RX ADMIN — DEXTROSE AND SODIUM CHLORIDE 30 ML/HR: 5; 450 INJECTION, SOLUTION INTRAVENOUS at 15:26

## 2023-03-17 RX ADMIN — LIDOCAINE HYDROCHLORIDE 50 MG: 20 INJECTION, SOLUTION INFILTRATION; PERINEURAL at 15:51

## 2023-03-17 RX ADMIN — PROPOFOL 50 MG: 10 INJECTION, EMULSION INTRAVENOUS at 15:56

## 2023-03-17 RX ADMIN — PROPOFOL 20 MG: 10 INJECTION, EMULSION INTRAVENOUS at 16:04

## 2023-03-17 RX ADMIN — PROPOFOL 20 MG: 10 INJECTION, EMULSION INTRAVENOUS at 16:13

## 2023-03-17 RX ADMIN — PROPOFOL 20 MG: 10 INJECTION, EMULSION INTRAVENOUS at 16:10

## 2023-03-17 RX ADMIN — PROPOFOL 90 MG: 10 INJECTION, EMULSION INTRAVENOUS at 15:51

## 2023-03-17 RX ADMIN — PROPOFOL 20 MG: 10 INJECTION, EMULSION INTRAVENOUS at 16:07

## 2023-03-17 NOTE — ANESTHESIA PREPROCEDURE EVALUATION
Anesthesia Evaluation     Patient summary reviewed   NPO Solid Status: > 8 hours  NPO Liquid Status: > 4 hours           Airway   Mallampati: III  TM distance: >3 FB  Neck ROM: full  Possible difficult intubation  Dental    (+) edentulous    Pulmonary - negative pulmonary ROS and normal exam   Cardiovascular - normal exam  Exercise tolerance: good (4-7 METS)    (+) hypertension well controlled less than 2 medications, hyperlipidemia,       Neuro/Psych  (+) TIA, numbness, psychiatric history Anxiety,    GI/Hepatic/Renal/Endo    (+) obesity,  hiatal hernia, GERD well controlled, GI bleeding , renal disease stones, thyroid problem hypothyroidism    Musculoskeletal     Abdominal   (+) obese,    Substance History      OB/GYN          Other   arthritis,                        Anesthesia Plan    ASA 3     general   total IV anesthesia  intravenous induction     Anesthetic plan, risks, benefits, and alternatives have been provided, discussed and informed consent has been obtained with: patient.    Plan discussed with CRNA.        CODE STATUS:

## 2023-03-17 NOTE — ANESTHESIA POSTPROCEDURE EVALUATION
Patient: Marielle Hansen    Procedure Summary     Date: 03/17/23 Room / Location: [facility] ENDOSCOPY 1 / [facility] ENDOSCOPY    Anesthesia Start: 1548 Anesthesia Stop:     Procedures:       ESOPHAGOGASTRODUODENOSCOPY      COLONOSCOPY Diagnosis:       Encounter for colonoscopy due to history of adenomatous colonic polyps      Milligan's esophagus without dysplasia      (Encounter for colonoscopy due to history of adenomatous colonic polyps [Z12.11, Z86.010])      (Milligan's esophagus without dysplasia [K22.70])    Surgeons: Ashlyn King MD Provider: Barry Sutton CRNA    Anesthesia Type: general ASA Status: 3          Anesthesia Type: general    Vitals  No vitals data found for the desired time range.          Post Anesthesia Care and Evaluation    Patient location during evaluation: PHASE II  Patient participation: complete - patient participated  Level of consciousness: sleepy but conscious  Pain score: 0  Pain management: adequate    Airway patency: patent  Anesthetic complications: No anesthetic complications  PONV Status: none  Cardiovascular status: acceptable  Respiratory status: acceptable  Hydration status: acceptable    Comments: ---------------------------               03/17/23                      1617      ---------------------------   BP:        148/67       Pulse:         70           Resp:          20           Temp:   96.9 °F (36.1 °C)   SpO2:          97%         ---------------------------

## 2023-03-22 ENCOUNTER — TELEMEDICINE (OUTPATIENT)
Dept: FAMILY MEDICINE CLINIC | Facility: CLINIC | Age: 73
End: 2023-03-22
Payer: MEDICARE

## 2023-03-22 VITALS — HEIGHT: 66 IN | BODY MASS INDEX: 33.91 KG/M2 | WEIGHT: 211 LBS

## 2023-03-22 DIAGNOSIS — N18.32 STAGE 3B CHRONIC KIDNEY DISEASE: ICD-10-CM

## 2023-03-22 DIAGNOSIS — M54.41 CHRONIC BILATERAL LOW BACK PAIN WITH BILATERAL SCIATICA: Primary | ICD-10-CM

## 2023-03-22 DIAGNOSIS — I10 ESSENTIAL HYPERTENSION: ICD-10-CM

## 2023-03-22 DIAGNOSIS — E66.9 OBESITY, CLASS I, BMI 30-34.9: ICD-10-CM

## 2023-03-22 DIAGNOSIS — M54.42 CHRONIC BILATERAL LOW BACK PAIN WITH BILATERAL SCIATICA: Primary | ICD-10-CM

## 2023-03-22 DIAGNOSIS — G89.29 CHRONIC BILATERAL LOW BACK PAIN WITH BILATERAL SCIATICA: Primary | ICD-10-CM

## 2023-03-22 DIAGNOSIS — J45.20 MILD INTERMITTENT ASTHMA WITHOUT COMPLICATION: ICD-10-CM

## 2023-03-22 RX ORDER — ALBUTEROL SULFATE 90 UG/1
2 AEROSOL, METERED RESPIRATORY (INHALATION) EVERY 4 HOURS PRN
Qty: 18 G | Refills: 5 | Status: SHIPPED | OUTPATIENT
Start: 2023-03-22

## 2023-03-22 RX ORDER — ALBUTEROL SULFATE 90 UG/1
2 AEROSOL, METERED RESPIRATORY (INHALATION) EVERY 4 HOURS PRN
Qty: 18 G | Refills: 5 | Status: SHIPPED | OUTPATIENT
Start: 2023-03-22 | End: 2023-03-22 | Stop reason: SDUPTHER

## 2023-03-22 NOTE — PROGRESS NOTES
"Subjective    Seen today by video visit due to the Covid-19 quarantine.   You have chosen to receive care through a telehealth visit.  Do you consent to use a video/audio connection for your medical care today? Yes  Patient with hypertension, hypothyroidism, chronic low back pain, CKD following up today for 90-day medication review, has a few other concerns.     Her blood pressure has been staying at goal.  She has been keeping an eye on it.      She has CKD stage 3 and is trying to make more healthy choices in her diet and lifestyle.  She is not a candidate for NSAIDs with the kidney disease.  She is very compliant with medication regimen with the hydrocodone for pain issues.  She does have a lot of arthritis pain issues.    She is having coughing and congestion, worse in the mornings, but comes and goes all day.  She coughs up a \"creamy\" looking sputum.  She has been out of her inhaler and feels that this would help if she had it back.    She has lost almost 20 lbs and is trying a \"healthy eating\" plan.  She is hoping this will help with her shortness of breath on exertion.  She is trying to be more active and walk daily.     She had her colonoscopy recently.     History of Present Illness   The following portions of the patient's history were reviewed and updated as appropriate: allergies, current medications, past family history, past medical history, past social history, past surgical history and problem list.  Back Pain   This is a chronic problem. The current episode started more than 1 year ago. The problem occurs constantly. The problem is unchanged. The pain is present in the lumbar spine. The quality of the pain is described as shooting, stabbing and aching. The pain radiates to the right knee and left knee. The pain is at a severity of 8/10. The pain is severe. The pain is the same all the time. The symptoms are aggravated by standing, twisting, position, bending and sitting. Stiffness is present at " "night, all day and in the morning. Associated symptoms include leg pain and tingling. Pertinent negatives include no abdominal pain, bladder incontinence, bowel incontinence, chest pain, dysuria, fever, headaches, numbness, paresis, paresthesias, pelvic pain, perianal numbness, weakness or weight loss. Risk factors include sedentary lifestyle. Patient has tried analgesics, NSAIDs, muscle relaxant and heat for the symptoms. The treatment provided mild relief.     Review of Systems   Respiratory: Negative.    Cardiovascular: Negative.    Genitourinary: Negative for hematuria and vaginal bleeding.   Musculoskeletal: Positive for gait problem.   Skin: Negative.    Allergic/Immunologic: Negative for immunocompromised state.   Neurological: Negative for dizziness, tremors, seizures, syncope and light-headedness.   Hematological: Negative.    Psychiatric/Behavioral: Positive for sleep disturbance. Negative for agitation and confusion. The patient is nervous/anxious.    All other systems reviewed and are negative.    Objective    Vitals:    03/22/23 1414   Weight: 95.7 kg (211 lb)   Height: 167.6 cm (66\")   Body mass index is 34.06 kg/m².    Physical Exam   Constitutional: She is oriented to person, place, and time. She appears well-developed. No distress.   HENT:   Head: Normocephalic and atraumatic.   Eyes: Pupils are equal, round, and reactive to light. Right eye exhibits no discharge. Left eye exhibits no discharge.   Pulmonary/Chest: Effort normal.   Neurological: She is alert and oriented to person, place, and time.   Psychiatric: Her behavior is normal. Judgment and thought content normal.       Assessment & Plan   Diagnoses and all orders for this visit:    1. Chronic bilateral low back pain with bilateral sciatica (Primary)    2. Essential hypertension    3. Stage 3b chronic kidney disease (HCC)    4. Obesity, Class I, BMI 30-34.9    5. Mild intermittent asthma without complication  -     albuterol sulfate  " (90 Base) MCG/ACT inhaler; Inhale 2 puffs Every 4 (Four) Hours As Needed for Wheezing.  Dispense: 18 g; Refill: 5    Other orders  -     Discontinue: albuterol sulfate  (90 Base) MCG/ACT inhaler; Inhale 2 puffs Every 4 (Four) Hours As Needed for Wheezing.  Dispense: 18 g; Refill: 5    The patient has read and signed the [facility] Controlled Substance Contract.  I will continue to see patient for regular follow up appointments. Patient is well controlled on the medication.  MARY has been reviewed by me and is updated every 3 months. The patient is aware of the potential for addiction and dependence.     Refilled hydrocodone for the low back pain as she is very compliant with medication regimen and is not an NSAID candidate with the chronic kidney disease.    We will continue to monitor renal functions    Patient's (Body mass index is 34.06 kg/m².) indicates that they are obese (BMI >30) with health related conditions that include hypertension and dyslipidemias . Weight is improving with lifestyle modifications. BMI is is above average; BMI management plan is completed. We discussed portion control and increasing exercise.     Continue current medications for hypertension and continue to monitor blood pressures regularly with goal of 130/80 or less        This document has been electronically signed by Yoselin Rubio MD on March 22, 2023 13:35 CDT

## 2023-03-23 DIAGNOSIS — M54.41 CHRONIC BILATERAL LOW BACK PAIN WITH BILATERAL SCIATICA: ICD-10-CM

## 2023-03-23 DIAGNOSIS — G89.29 CHRONIC BILATERAL LOW BACK PAIN WITH BILATERAL SCIATICA: ICD-10-CM

## 2023-03-23 DIAGNOSIS — M54.42 CHRONIC BILATERAL LOW BACK PAIN WITH BILATERAL SCIATICA: ICD-10-CM

## 2023-03-23 LAB — REF LAB TEST METHOD: NORMAL

## 2023-03-23 RX ORDER — HYDROCODONE BITARTRATE AND ACETAMINOPHEN 7.5; 325 MG/1; MG/1
1 TABLET ORAL EVERY 6 HOURS PRN
Qty: 120 TABLET | Refills: 0 | Status: SHIPPED | OUTPATIENT
Start: 2023-03-23

## 2023-03-28 ENCOUNTER — PREP FOR SURGERY (OUTPATIENT)
Dept: OTHER | Facility: HOSPITAL | Age: 73
End: 2023-03-28
Payer: MEDICARE

## 2023-03-28 DIAGNOSIS — N32.81 OAB (OVERACTIVE BLADDER): Primary | ICD-10-CM

## 2023-03-28 RX ORDER — LEVOFLOXACIN 5 MG/ML
500 INJECTION, SOLUTION INTRAVENOUS ONCE
Status: CANCELLED | OUTPATIENT
Start: 2023-03-31 | End: 2023-03-28

## 2023-03-29 ENCOUNTER — PRE-ADMISSION TESTING (OUTPATIENT)
Dept: PREADMISSION TESTING | Facility: HOSPITAL | Age: 73
End: 2023-03-29
Payer: MEDICARE

## 2023-03-29 VITALS
DIASTOLIC BLOOD PRESSURE: 70 MMHG | RESPIRATION RATE: 16 BRPM | HEART RATE: 66 BPM | SYSTOLIC BLOOD PRESSURE: 126 MMHG | OXYGEN SATURATION: 96 %

## 2023-03-29 PROBLEM — N32.81 OAB (OVERACTIVE BLADDER): Status: ACTIVE | Noted: 2023-03-29

## 2023-03-29 LAB
ANION GAP SERPL CALCULATED.3IONS-SCNC: 12 MMOL/L (ref 5–15)
BUN SERPL-MCNC: 22 MG/DL (ref 8–23)
BUN/CREAT SERPL: 19.1 (ref 7–25)
CALCIUM SPEC-SCNC: 9.8 MG/DL (ref 8.6–10.5)
CHLORIDE SERPL-SCNC: 98 MMOL/L (ref 98–107)
CO2 SERPL-SCNC: 27 MMOL/L (ref 22–29)
CREAT SERPL-MCNC: 1.15 MG/DL (ref 0.57–1)
EGFRCR SERPLBLD CKD-EPI 2021: 50.7 ML/MIN/1.73
GLUCOSE SERPL-MCNC: 92 MG/DL (ref 65–99)
POTASSIUM SERPL-SCNC: 4.2 MMOL/L (ref 3.5–5.2)
SODIUM SERPL-SCNC: 137 MMOL/L (ref 136–145)

## 2023-03-29 PROCEDURE — 80048 BASIC METABOLIC PNL TOTAL CA: CPT

## 2023-03-29 PROCEDURE — 36415 COLL VENOUS BLD VENIPUNCTURE: CPT

## 2023-03-29 RX ORDER — SODIUM CHLORIDE 9 MG/ML
1000 INJECTION, SOLUTION INTRAVENOUS CONTINUOUS
Status: CANCELLED | OUTPATIENT
Start: 2023-03-31

## 2023-03-31 ENCOUNTER — ANESTHESIA EVENT (OUTPATIENT)
Dept: PERIOP | Facility: HOSPITAL | Age: 73
End: 2023-03-31
Payer: MEDICARE

## 2023-03-31 ENCOUNTER — HOSPITAL ENCOUNTER (OUTPATIENT)
Facility: HOSPITAL | Age: 73
Setting detail: HOSPITAL OUTPATIENT SURGERY
Discharge: HOME | End: 2023-03-31
Attending: UROLOGY | Admitting: UROLOGY
Payer: MEDICARE

## 2023-03-31 ENCOUNTER — ANESTHESIA (OUTPATIENT)
Dept: PERIOP | Facility: HOSPITAL | Age: 73
End: 2023-03-31
Payer: MEDICARE

## 2023-03-31 VITALS
HEIGHT: 67 IN | DIASTOLIC BLOOD PRESSURE: 68 MMHG | SYSTOLIC BLOOD PRESSURE: 129 MMHG | RESPIRATION RATE: 18 BRPM | WEIGHT: 213.41 LBS | BODY MASS INDEX: 33.49 KG/M2 | TEMPERATURE: 97 F | OXYGEN SATURATION: 95 % | HEART RATE: 60 BPM

## 2023-03-31 DIAGNOSIS — N32.81 OAB (OVERACTIVE BLADDER): ICD-10-CM

## 2023-03-31 DIAGNOSIS — N39.41 URGE INCONTINENCE OF URINE: Primary | ICD-10-CM

## 2023-03-31 PROCEDURE — 25010000002 ONABOTULINUMTOXINA 100 UNITS RECONSTITUTED SOLUTION: Performed by: UROLOGY

## 2023-03-31 PROCEDURE — 25010000002 PROPOFOL 200 MG/20ML EMULSION: Performed by: NURSE ANESTHETIST, CERTIFIED REGISTERED

## 2023-03-31 PROCEDURE — 25010000002 LEVOFLOXACIN PER 250 MG: Performed by: UROLOGY

## 2023-03-31 RX ORDER — LEVOFLOXACIN 5 MG/ML
500 INJECTION, SOLUTION INTRAVENOUS ONCE
Status: COMPLETED | OUTPATIENT
Start: 2023-03-31 | End: 2023-03-31

## 2023-03-31 RX ORDER — PROPOFOL 10 MG/ML
INJECTION, EMULSION INTRAVENOUS AS NEEDED
Status: DISCONTINUED | OUTPATIENT
Start: 2023-03-31 | End: 2023-03-31 | Stop reason: SURG

## 2023-03-31 RX ORDER — SULFAMETHOXAZOLE AND TRIMETHOPRIM 800; 160 MG/1; MG/1
1 TABLET ORAL 2 TIMES DAILY
Qty: 6 TABLET | Refills: 0 | Status: SHIPPED | OUTPATIENT
Start: 2023-03-31

## 2023-03-31 RX ORDER — SODIUM CHLORIDE 9 MG/ML
1000 INJECTION, SOLUTION INTRAVENOUS CONTINUOUS
Status: DISCONTINUED | OUTPATIENT
Start: 2023-03-31 | End: 2023-03-31 | Stop reason: HOSPADM

## 2023-03-31 RX ORDER — LIDOCAINE HYDROCHLORIDE 20 MG/ML
JELLY TOPICAL AS NEEDED
Status: DISCONTINUED | OUTPATIENT
Start: 2023-03-31 | End: 2023-03-31 | Stop reason: HOSPADM

## 2023-03-31 RX ADMIN — PROPOFOL 60 MG: 10 INJECTION, EMULSION INTRAVENOUS at 09:36

## 2023-03-31 RX ADMIN — PROPOFOL 40 MG: 10 INJECTION, EMULSION INTRAVENOUS at 09:53

## 2023-03-31 RX ADMIN — SODIUM CHLORIDE 1000 ML: 9 INJECTION, SOLUTION INTRAVENOUS at 07:24

## 2023-03-31 RX ADMIN — PROPOFOL 40 MG: 10 INJECTION, EMULSION INTRAVENOUS at 09:45

## 2023-03-31 RX ADMIN — PROPOFOL 20 MG: 10 INJECTION, EMULSION INTRAVENOUS at 10:01

## 2023-03-31 RX ADMIN — PROPOFOL 20 MG: 10 INJECTION, EMULSION INTRAVENOUS at 09:43

## 2023-03-31 RX ADMIN — PROPOFOL 40 MG: 10 INJECTION, EMULSION INTRAVENOUS at 10:06

## 2023-03-31 RX ADMIN — PROPOFOL 30 MG: 10 INJECTION, EMULSION INTRAVENOUS at 10:09

## 2023-03-31 RX ADMIN — LEVOFLOXACIN 500 MG: 5 INJECTION, SOLUTION INTRAVENOUS at 09:39

## 2023-03-31 RX ADMIN — PROPOFOL 30 MG: 10 INJECTION, EMULSION INTRAVENOUS at 09:56

## 2023-03-31 RX ADMIN — PROPOFOL 40 MG: 10 INJECTION, EMULSION INTRAVENOUS at 09:40

## 2023-03-31 RX ADMIN — PROPOFOL 30 MG: 10 INJECTION, EMULSION INTRAVENOUS at 09:58

## 2023-03-31 RX ADMIN — PROPOFOL 40 MG: 10 INJECTION, EMULSION INTRAVENOUS at 09:49

## 2023-03-31 RX ADMIN — PROPOFOL 40 MG: 10 INJECTION, EMULSION INTRAVENOUS at 10:03

## 2023-03-31 NOTE — BRIEF OP NOTE
CYSTOSCOPY BOTOX INJECTION OF BLADDER  Progress Note    Marielle Hansen  3/31/2023    Pre-op Diagnosis:   OAB (overactive bladder) [N32.81]       Post-Op Diagnosis Codes:     * OAB (overactive bladder) [N32.81]    Procedure/CPT® Codes:        Procedure(s):  CYSTOSCOPY BOTOX INJECTION OF BLADDER        Surgeon(s):  D'Amico, Anna M., MD    Anesthesia: Monitored Anesthesia Care    Staff:   Circulator: Lucie Dumont RN; Wendi Hoffman RN  Scrub Person: Henna Baez         Estimated Blood Loss: minimal    Urine Voided: * No values recorded between 3/31/2023  9:30 AM and 3/31/2023 10:15 AM *    Specimens:                None          Drains: * No LDAs found *    Findings:  Open bladder neck - small capacity bladder     200 units of botox injected       Complications: none           Anna M. D'Amico, MD     Date: 3/31/2023  Time: 10:24 CDT

## 2023-03-31 NOTE — ANESTHESIA POSTPROCEDURE EVALUATION
Patient: Marielle Hansen    Procedure Summary     Date: 03/31/23 Room / Location: [facility] OR  / [facility] OR    Anesthesia Start: 0931 Anesthesia Stop: 1019    Procedure: CYSTOSCOPY BOTOX INJECTION OF BLADDER Diagnosis:       OAB (overactive bladder)      (OAB (overactive bladder) [N32.81])    Surgeons: D'Amico, Anna M., MD Provider: Barry Sutton CRNA    Anesthesia Type: MAC ASA Status: 3          Anesthesia Type: MAC    Vitals  No vitals data found for the desired time range.          Post Anesthesia Care and Evaluation    Patient location during evaluation: bedside  Patient participation: complete - patient participated  Level of consciousness: awake and alert  Pain management: adequate    Airway patency: patent  Anesthetic complications: No anesthetic complications    Cardiovascular status: acceptable  Respiratory status: acceptable  Hydration status: acceptable    Comments:    BP:          132/60         Pulse:         68           Resp:          20           Temp:   97.3 °F (36.2 °C)   SpO2:          96%         ---------------------------

## 2023-03-31 NOTE — ANESTHESIA PREPROCEDURE EVALUATION
Anesthesia Evaluation     Patient summary reviewed and Nursing notes reviewed   no history of anesthetic complications:  NPO Solid Status: > 8 hours  NPO Liquid Status: > 4 hours           Airway   Mallampati: II  TM distance: >3 FB  Neck ROM: full  No difficulty expected  Dental    (+) edentulous    Pulmonary    (+) a smoker Former, asthma,decreased breath sounds,   (-) shortness of breath, recent URI, rhonchi, wheezes  Cardiovascular - normal exam  Exercise tolerance: poor (<4 METS)    ECG reviewed  Patient on routine beta blocker and Beta blocker given within 24 hours of surgery  Rhythm: regular  Rate: normal    (+) hypertension well controlled 2 medications or greater, dysrhythmias Bradycardia, hyperlipidemia,   (-) pacemaker, past MI, angina, murmur, peripheral edema, cardiac stents, CABG, DVT    ROS comment: EKG 9/20/2022:  Sinus bradycardia  Otherwise normal ECG  When compared with ECG of 19-JUN-2020 10:08,  No significant change was found    Neuro/Psych  (+) TIA, CVA residual symptoms, numbness, psychiatric history Anxiety,    (-) seizures  GI/Hepatic/Renal/Endo    (+) obesity,  hiatal hernia, GERD well controlled, GI bleeding resolved, renal disease stones and CRI, thyroid problem hypothyroidism  (-) morbid obesity    Musculoskeletal     Abdominal   (+) obese,    Substance History      OB/GYN negative ob/gyn ROS         Other   arthritis,      ROS/Med Hx Other: Hx of asthma: per pt she hasn't used albuterol inhaler in a while. Has been over a month since she has used.     Hx of CVA (2015) pt has weakness of right eye and right sided facial droop                    Anesthesia Plan    ASA 3     MAC   total IV anesthesia  intravenous induction     Anesthetic plan, risks, benefits, and alternatives have been provided, discussed and informed consent has been obtained with: patient and child.  Pre-procedure education provided  Plan discussed with CRNA.        CODE STATUS:

## 2023-04-17 DIAGNOSIS — G89.29 CHRONIC BILATERAL LOW BACK PAIN WITH BILATERAL SCIATICA: ICD-10-CM

## 2023-04-17 DIAGNOSIS — M54.41 CHRONIC BILATERAL LOW BACK PAIN WITH BILATERAL SCIATICA: ICD-10-CM

## 2023-04-17 DIAGNOSIS — M54.42 CHRONIC BILATERAL LOW BACK PAIN WITH BILATERAL SCIATICA: ICD-10-CM

## 2023-04-17 DIAGNOSIS — E55.9 VITAMIN D DEFICIENCY: ICD-10-CM

## 2023-04-17 RX ORDER — ESCITALOPRAM OXALATE 10 MG/1
10 TABLET ORAL NIGHTLY
Qty: 90 TABLET | Refills: 0 | Status: SHIPPED | OUTPATIENT
Start: 2023-04-17

## 2023-04-17 RX ORDER — HYDROCODONE BITARTRATE AND ACETAMINOPHEN 7.5; 325 MG/1; MG/1
1 TABLET ORAL EVERY 6 HOURS PRN
Qty: 120 TABLET | Refills: 0 | Status: SHIPPED | OUTPATIENT
Start: 2023-04-17

## 2023-04-17 RX ORDER — TRAZODONE HYDROCHLORIDE 150 MG/1
150 TABLET ORAL NIGHTLY
Qty: 30 TABLET | Refills: 3 | Status: SHIPPED | OUTPATIENT
Start: 2023-04-17

## 2023-04-17 RX ORDER — OLMESARTAN MEDOXOMIL AND HYDROCHLOROTHIAZIDE 40/12.5 40; 12.5 MG/1; MG/1
1 TABLET ORAL DAILY
Qty: 90 TABLET | Refills: 0 | Status: SHIPPED | OUTPATIENT
Start: 2023-04-17

## 2023-05-16 DIAGNOSIS — M54.41 CHRONIC BILATERAL LOW BACK PAIN WITH BILATERAL SCIATICA: ICD-10-CM

## 2023-05-16 DIAGNOSIS — G89.29 CHRONIC BILATERAL LOW BACK PAIN WITH BILATERAL SCIATICA: ICD-10-CM

## 2023-05-16 DIAGNOSIS — M54.42 CHRONIC BILATERAL LOW BACK PAIN WITH BILATERAL SCIATICA: ICD-10-CM

## 2023-05-16 RX ORDER — HYDROCODONE BITARTRATE AND ACETAMINOPHEN 7.5; 325 MG/1; MG/1
1 TABLET ORAL EVERY 6 HOURS PRN
Qty: 120 TABLET | Refills: 0 | Status: SHIPPED | OUTPATIENT
Start: 2023-05-16

## 2023-06-13 DIAGNOSIS — R11.0 NAUSEA: ICD-10-CM

## 2023-06-13 DIAGNOSIS — M10.072 ACUTE IDIOPATHIC GOUT OF LEFT FOOT: Primary | ICD-10-CM

## 2023-06-13 DIAGNOSIS — M54.41 CHRONIC BILATERAL LOW BACK PAIN WITH BILATERAL SCIATICA: ICD-10-CM

## 2023-06-13 DIAGNOSIS — G89.29 CHRONIC BILATERAL LOW BACK PAIN WITH BILATERAL SCIATICA: ICD-10-CM

## 2023-06-13 DIAGNOSIS — M54.42 CHRONIC BILATERAL LOW BACK PAIN WITH BILATERAL SCIATICA: ICD-10-CM

## 2023-06-13 RX ORDER — COLCHICINE 0.6 MG/1
TABLET ORAL
Qty: 60 TABLET | Refills: 5 | Status: SHIPPED | OUTPATIENT
Start: 2023-06-13

## 2023-06-13 RX ORDER — HYDROCODONE BITARTRATE AND ACETAMINOPHEN 7.5; 325 MG/1; MG/1
1 TABLET ORAL EVERY 6 HOURS PRN
Qty: 120 TABLET | Refills: 0 | Status: SHIPPED | OUTPATIENT
Start: 2023-06-13

## 2023-06-13 RX ORDER — PROMETHAZINE HYDROCHLORIDE 12.5 MG/1
12.5 TABLET ORAL EVERY 6 HOURS PRN
Qty: 20 TABLET | Refills: 0 | Status: SHIPPED | OUTPATIENT
Start: 2023-06-13

## 2023-07-24 ENCOUNTER — OFFICE VISIT (OUTPATIENT)
Dept: FAMILY MEDICINE CLINIC | Facility: CLINIC | Age: 73
End: 2023-07-24
Payer: MEDICARE

## 2023-07-24 VITALS
BODY MASS INDEX: 34.39 KG/M2 | TEMPERATURE: 98 F | OXYGEN SATURATION: 93 % | DIASTOLIC BLOOD PRESSURE: 70 MMHG | WEIGHT: 214 LBS | HEART RATE: 78 BPM | SYSTOLIC BLOOD PRESSURE: 140 MMHG | HEIGHT: 66 IN

## 2023-07-24 DIAGNOSIS — I10 ESSENTIAL HYPERTENSION: Primary | ICD-10-CM

## 2023-07-24 DIAGNOSIS — M54.41 CHRONIC BILATERAL LOW BACK PAIN WITH BILATERAL SCIATICA: ICD-10-CM

## 2023-07-24 DIAGNOSIS — F33.1 MAJOR DEPRESSIVE DISORDER, RECURRENT EPISODE, MODERATE DEGREE: ICD-10-CM

## 2023-07-24 DIAGNOSIS — G89.29 CHRONIC BILATERAL LOW BACK PAIN WITH BILATERAL SCIATICA: ICD-10-CM

## 2023-07-24 DIAGNOSIS — E78.2 MIXED HYPERLIPIDEMIA: ICD-10-CM

## 2023-07-24 DIAGNOSIS — N18.32 STAGE 3B CHRONIC KIDNEY DISEASE: ICD-10-CM

## 2023-07-24 DIAGNOSIS — M54.42 CHRONIC BILATERAL LOW BACK PAIN WITH BILATERAL SCIATICA: ICD-10-CM

## 2023-07-24 PROCEDURE — 99214 OFFICE O/P EST MOD 30 MIN: CPT | Performed by: FAMILY MEDICINE

## 2023-07-24 PROCEDURE — 1160F RVW MEDS BY RX/DR IN RCRD: CPT | Performed by: FAMILY MEDICINE

## 2023-07-24 PROCEDURE — 1159F MED LIST DOCD IN RCRD: CPT | Performed by: FAMILY MEDICINE

## 2023-07-24 PROCEDURE — 3078F DIAST BP <80 MM HG: CPT | Performed by: FAMILY MEDICINE

## 2023-07-24 PROCEDURE — 3077F SYST BP >= 140 MM HG: CPT | Performed by: FAMILY MEDICINE

## 2023-07-24 RX ORDER — ESCITALOPRAM OXALATE 20 MG/1
20 TABLET ORAL DAILY
Qty: 30 TABLET | Refills: 3 | Status: SHIPPED | OUTPATIENT
Start: 2023-07-24

## 2023-07-24 RX ORDER — GABAPENTIN 300 MG/1
300 CAPSULE ORAL 3 TIMES DAILY
Qty: 90 CAPSULE | Refills: 2 | Status: SHIPPED | OUTPATIENT
Start: 2023-07-24

## 2023-07-24 NOTE — PROGRESS NOTES
Subjective    Patient with hypertension, chronic low back pain, vitamin deficiencies here today for follow-up and with some recurrent episodes of dizziness.  She has also been having an exacerbation of back pain.    She said the back pain is so severe that it limits her activity.  She cannot even stand long enough to do things like cooking or washing dishes without her back hurting so bad she has to sit down.  She has hydrocodone for the pain.  The pain shoots down both legs and she wondered about going back on gabapentin.  This helped in the past.  Her symptoms improved so she stopped taking it.    Blood pressure has been well controlled.  She is on Benicar HCT, metoprolol, and amlodipine.    She does feel dizzy often especially when she first stands up.    She also feels depressed.  She has lost 2 loved ones over the past couple years.  She notes that she has been struggling to deal with this.  She says she just avoids people and does not want to go outside of her home most of the time.    Also has chronic kidney disease, has labs ordered.  Most recent GFR 50.7.    History of Present Illness   The following portions of the patient's history were reviewed and updated as appropriate: allergies, current medications, past family history, past medical history, past social history, past surgical history and problem list.  Back Pain   This is a chronic problem. The current episode started more than 1 year ago. The problem occurs constantly. The problem is unchanged. The pain is present in the lumbar spine. The quality of the pain is described as shooting, stabbing and aching. The pain radiates to the right knee and left knee. The pain is at a severity of 8/10. The pain is severe. The pain is the same all the time. The symptoms are aggravated by standing, twisting, position, bending and sitting. Stiffness is present at night, all day and in the morning. Associated symptoms include leg pain and tingling. Pertinent  "negatives include no abdominal pain, bladder incontinence, bowel incontinence, chest pain, dysuria, fever, headaches, numbness, paresis, paresthesias, pelvic pain, perianal numbness, weakness or weight loss. Risk factors include sedentary lifestyle. Patient has tried analgesics, NSAIDs, muscle relaxant and heat for the symptoms. The treatment provided mild relief.     Review of Systems   All other systems reviewed and are negative.    Objective    Vitals:    07/24/23 0948   BP: 140/70   Pulse: 78   Temp: 98 °F (36.7 °C)   SpO2: 93%   Weight: 97.1 kg (214 lb)   Height: 167.6 cm (66\")   PainSc: 0-No pain   Body mass index is 34.54 kg/m².    Physical Exam   Constitutional: She is oriented to person, place, and time. She appears well-developed. No distress.   HENT:   Head: Normocephalic and atraumatic.   Eyes: Pupils are equal, round, and reactive to light. Right eye exhibits no discharge. Left eye exhibits no discharge.   Pulmonary/Chest: Effort normal.   Genitourinary:    Genitourinary Comments: Pelvic exam reveals atrophic appearing vaginal mucosa, surgical absence of uterus.  No significant cystocele or bladder prolapse.     Neurological: She is alert and oriented to person, place, and time.   Psychiatric: Her behavior is normal. Judgment and thought content normal.     Assessment & Plan   Diagnoses and all orders for this visit:    1. Essential hypertension (Primary)    2. Chronic bilateral low back pain with bilateral sciatica  -     gabapentin (NEURONTIN) 300 MG capsule; Take 1 capsule by mouth 3 (Three) Times a Day.  Dispense: 90 capsule; Refill: 2    3. Major depressive disorder, recurrent episode, moderate degree  -     escitalopram (Lexapro) 20 MG tablet; Take 1 tablet by mouth Daily.  Dispense: 30 tablet; Refill: 3    4. Mixed hyperlipidemia  -     Lipid Panel; Future    5. Stage 3b chronic kidney disease    I suspect some of the lightheadedness with standing is secondary to her blood pressure medicines, " such as the beta-blocker.  We will have her continue to monitor blood pressures regularly, use caution when she for stands up and if symptoms worsen or persist let me know    We will go back on gabapentin along with the hydrocodone for the low back pain.  She tolerated this well in the past.  Recommend to start on just taking it nightly and may add daytime doses if needed.    We will double the Lexapro dose to 20 mg for depression symptoms and return to contact me if not improving within a month.    She is due for labs which are ordered, will add a lipid panel with these.    We will continue monitor renal functions.    The patient has read and signed the [facility] Controlled Substance Contract.  I will continue to see patient for regular follow up appointments. Patient is well controlled on the medication.  MARY has been reviewed by me and is updated every 3 months. The patient is aware of the potential for addiction and dependence.           This document has been electronically signed by Yoselin Rubio MD on July 24, 2023 10:20 CDT

## 2023-07-25 DIAGNOSIS — F33.1 MAJOR DEPRESSIVE DISORDER, RECURRENT EPISODE, MODERATE DEGREE: ICD-10-CM

## 2023-07-25 RX ORDER — ESCITALOPRAM OXALATE 20 MG/1
TABLET ORAL
Qty: 30 TABLET | Refills: 3 | OUTPATIENT
Start: 2023-07-25

## 2023-08-14 DIAGNOSIS — M54.41 CHRONIC BILATERAL LOW BACK PAIN WITH BILATERAL SCIATICA: ICD-10-CM

## 2023-08-14 DIAGNOSIS — G89.29 CHRONIC BILATERAL LOW BACK PAIN WITH BILATERAL SCIATICA: ICD-10-CM

## 2023-08-14 DIAGNOSIS — M54.42 CHRONIC BILATERAL LOW BACK PAIN WITH BILATERAL SCIATICA: ICD-10-CM

## 2023-08-14 RX ORDER — HYDROCODONE BITARTRATE AND ACETAMINOPHEN 7.5; 325 MG/1; MG/1
1 TABLET ORAL EVERY 6 HOURS PRN
Qty: 120 TABLET | Refills: 0 | Status: SHIPPED | OUTPATIENT
Start: 2023-08-14

## 2023-08-24 ENCOUNTER — OFFICE VISIT (OUTPATIENT)
Dept: ORTHOPEDIC SURGERY | Facility: CLINIC | Age: 73
End: 2023-08-24
Payer: MEDICARE

## 2023-08-24 VITALS — HEIGHT: 66 IN | WEIGHT: 214 LBS | BODY MASS INDEX: 34.39 KG/M2

## 2023-08-24 DIAGNOSIS — I10 ESSENTIAL HYPERTENSION: ICD-10-CM

## 2023-08-24 DIAGNOSIS — M16.11 PRIMARY OSTEOARTHRITIS OF RIGHT HIP: ICD-10-CM

## 2023-08-24 DIAGNOSIS — J45.20 MILD INTERMITTENT ASTHMA WITHOUT COMPLICATION: ICD-10-CM

## 2023-08-24 DIAGNOSIS — M54.2 CERVICAL SPINE PAIN: Primary | ICD-10-CM

## 2023-08-24 PROCEDURE — 99213 OFFICE O/P EST LOW 20 MIN: CPT | Performed by: ORTHOPAEDIC SURGERY

## 2023-08-24 NOTE — PROGRESS NOTES
"Marielle Hansen is a 73 y.o. female returns for     Chief Complaint   Patient presents with    Right Hip - Follow-up, Pain       HISTORY OF PRESENT ILLNESS:    Patient states that she is doing very well in regards to her left hip.  Surgery was approximately 1 year ago.  She has continued to progress with strength, conditioning, and endurance.  She is now limited by her right hip.  She has pain with activity.  No specific injury that she recalls.  She has pain with activities of daily living.  Mostly it is a dull achy pain.  She also reports a tender nodule in the posterior aspect of her neck.  She is tender to touch and she has pain with activity.  She denies any numbness or tingling.     CONCURRENT MEDICAL HISTORY:    The following portions of the patient's history were reviewed and updated as appropriate: allergies, current medications, past family history, past medical history, past social history, past surgical history and problem list.     ROS  No fevers or chills.  No chest pain or shortness of air.  No GI or  disturbances.    PHYSICAL EXAMINATION:       Ht 167.6 cm (66\")   Wt 97.1 kg (214 lb)   BMI 34.54 kg/mý     Physical Exam  Constitutional:       General: She is not in acute distress.     Appearance: Normal appearance.   Pulmonary:      Effort: Pulmonary effort is normal. No respiratory distress.   Neurological:      Mental Status: She is alert and oriented to person, place, and time.       GAIT:     []  Normal  [x]  Antalgic    Assistive device: []  None  []  Walker     []  Crutches  [x]  Cane     []  Wheelchair  []  Stretcher    Back Exam     Comments:  Mild limitation in range of motion of the cervical spine.  Specific tenderness at the base of the cervical spine posteriorly.  No erythema.  No fluctuance noted.              XR Spine Cervical 3 View    Result Date: 8/24/2023  Narrative: Ordering Provider:  Johnny Rutherford MD Ordering Diagnosis/Indication:  Cervical spine pain " Procedure:  XR SPINE CERVICAL 3 VW Exam Date:  8/24/23 COMPARISON:  Todays X-rays were compared to previous images dated September 23, 2020.     Impression:  AP, lateral, and odontoid views of the cervical spine show severe degenerative changes especially at C4-5 and C5-6.  Osteophyte formation is present anteriorly and posteriorly.  Irregularity of the disc space is noted at C4-5 as well.  Mild interval worsening is noted in comparison to prior x-ray.  Loss of cervical lordosis is noted as well.  No acute findings. Johnny Rutherford MD 8/24/23     XR Hip With or Without Pelvis 2 - 3 View Right    Result Date: 8/24/2023  Narrative: Ordering Provider:  Johnny Rutherford MD Ordering Diagnosis/Indication:  Primary osteoarthritis of right hip Procedure:  XR HIP W OR WO PELVIS 2-3 VIEW RIGHT Exam Date:  8/24/23 COMPARISON:  Todays X-rays were compared to previous images dated October 12, 2022.     Impression:  AP standing of the pelvis with AP and lateral of the right hip show acceptable position and alignment of a left total hip arthroplasty.  No sign of implant loosening or failure is noted.  A single fixation screw is noted into the ilium.  Right hip shows moderate to severe arthritic changes with superior and inferior osteophytes.  Irregularity of the femoral head is noted as well.  Mild interval worsening is noted in the right hip as compared to prior x-rays. Johnny Rutherford MD 8/24/23            ASSESSMENT:    Diagnoses and all orders for this visit:    Cervical spine pain  -     MRI Cervical Spine Without Contrast; Future  -     XR Spine Cervical 3 View    Primary osteoarthritis of right hip    Mild intermittent asthma without complication    Essential hypertension          PLAN    Patient has progressively worsening osteoarthritis in the right hip.  She has done very well in regards to left total hip arthroplasty.  We began the discussion for possibility of right total hip  arthroplasty.    However, the patient has recent point tenderness on the lower portion of her cervical spine.  She has pain and limitation in range of motion as well.  X-rays today reveal significant degenerative changes in the cervical spine.  Plan is to proceed with MRI of the cervical spine to assess for any acute findings and anything that may affect potential surgical intervention for her hip.  She will continue with general strength and conditioning exercises.  She will continue to use cane or walker as necessary.  Follow-up after the MRI.    No follow-ups on file.    Johnny Rutherford MD

## 2023-09-12 DIAGNOSIS — M54.42 CHRONIC BILATERAL LOW BACK PAIN WITH BILATERAL SCIATICA: ICD-10-CM

## 2023-09-12 DIAGNOSIS — M54.41 CHRONIC BILATERAL LOW BACK PAIN WITH BILATERAL SCIATICA: ICD-10-CM

## 2023-09-12 DIAGNOSIS — R11.0 NAUSEA: ICD-10-CM

## 2023-09-12 DIAGNOSIS — G89.29 CHRONIC BILATERAL LOW BACK PAIN WITH BILATERAL SCIATICA: ICD-10-CM

## 2023-09-12 RX ORDER — PROMETHAZINE HYDROCHLORIDE 12.5 MG/1
12.5 TABLET ORAL EVERY 6 HOURS PRN
Qty: 20 TABLET | Refills: 0 | Status: SHIPPED | OUTPATIENT
Start: 2023-09-12

## 2023-09-12 RX ORDER — HYDROCODONE BITARTRATE AND ACETAMINOPHEN 7.5; 325 MG/1; MG/1
1 TABLET ORAL EVERY 6 HOURS PRN
Qty: 120 TABLET | Refills: 0 | Status: SHIPPED | OUTPATIENT
Start: 2023-09-12

## 2023-09-13 ENCOUNTER — TELEPHONE (OUTPATIENT)
Dept: FAMILY MEDICINE CLINIC | Facility: CLINIC | Age: 73
End: 2023-09-13
Payer: MEDICARE

## 2023-09-13 NOTE — TELEPHONE ENCOUNTER
Yoselin Rubio MD  You14 minutes ago (4:02 PM)      Let her know she is already on the highest dose that she can take a bit for sleep.  I can send in something else to try instead of that or she can try to take melatonin over-the-counter with it

## 2023-09-14 NOTE — TELEPHONE ENCOUNTER
Yoselin Rubio MD  You10 minutes ago (12:55 PM)      See if she can come in for an appointment.  I am concerned that she could have something going on like low oxygen at night causing her to wake up.  May need a sleep study

## 2023-09-21 ENCOUNTER — HOSPITAL ENCOUNTER (OUTPATIENT)
Dept: MRI IMAGING | Facility: HOSPITAL | Age: 73
Discharge: HOME | End: 2023-09-21
Admitting: ORTHOPAEDIC SURGERY
Payer: MEDICARE

## 2023-09-21 DIAGNOSIS — M54.2 CERVICAL SPINE PAIN: ICD-10-CM

## 2023-09-21 PROCEDURE — 72141 MRI NECK SPINE W/O DYE: CPT

## (undated) DEVICE — SYR LUERLOK 20CC BX/50

## (undated) DEVICE — NDL SCLEROTHRPY INTERJECT 25G 4 240 CLR

## (undated) DEVICE — Device: Brand: DISPOSABLE ELECTROSURGICAL SNARE

## (undated) DEVICE — NITINOL WIRE WITH HYDROPHILIC TIP: Brand: SENSOR

## (undated) DEVICE — DRAPE UNDERBUTTOCKS W FLUID POUCH 40X44IN

## (undated) DEVICE — STERILE POLYISOPRENE POWDER-FREE SURGICAL GLOVES WITH EMOLLIENT COATING: Brand: PROTEXIS

## (undated) DEVICE — SOL IRRIG NACL 1000ML

## (undated) DEVICE — DUAL CUT SAGITTAL BLADE

## (undated) DEVICE — PK CYSTO LF 60

## (undated) DEVICE — SINGLE-USE BIOPSY FORCEPS: Brand: RADIAL JAW 4

## (undated) DEVICE — CONTAINER,SPECIMEN,OR STERILE,4OZ: Brand: MEDLINE

## (undated) DEVICE — SOL IRR NACL 0.9PCT 3000ML

## (undated) DEVICE — GLV SURG SIGNATURE ESSENTIAL PF LTX SZ7

## (undated) DEVICE — SOL IRR H2O BTL 1000ML STRL

## (undated) DEVICE — TRAY,FOLEY INSERTION,PVP,10ML SYRINGE: Brand: MEDLINE

## (undated) DEVICE — GLV SURG SENSICARE PI ORTHO SZ6.5 LF STRL

## (undated) DEVICE — SYS CLS SKIN PREMIERPRO EXOFINFUSION 22CM

## (undated) DEVICE — PK HIP ANT LF 60

## (undated) DEVICE — GLV SURG SENSICARE PI PF LF 7 GRN STRL

## (undated) DEVICE — GLV SURG NEOLON 2G PF LF 6.5 STRL

## (undated) DEVICE — GOWN,AURORA,NOREINF,RAGLAN,XL,STERILE: Brand: MEDLINE

## (undated) DEVICE — ELECTRD BLD EZ CLN STD 4IN

## (undated) DEVICE — CATHETER,URETHRAL,REDRUBBER,STRL,14FR: Brand: MEDLINE INDUSTRIES, INC.

## (undated) DEVICE — GLV SURG NEOLON 2G PF LF 7.5 STRL

## (undated) DEVICE — SOL IRR H2O BG 1000ML STRL

## (undated) DEVICE — BITEBLOCK ENDO W/STRAP 60F A/ LF DISP

## (undated) DEVICE — CANN SMPL SOFTECH BIFLO ETCO2 A/M 7FT

## (undated) DEVICE — SYR LL TP 10ML STRL

## (undated) DEVICE — PATIENT RETURN ELECTRODE, SINGLE-USE, CONTACT QUALITY MONITORING, ADULT, WITH 9FT CORD, FOR PATIENTS WEIGING OVER 33LBS. (15KG): Brand: MEGADYNE

## (undated) DEVICE — TBG PENCL TELESCP MEGADYNE SMOKE EVAC 10FT

## (undated) DEVICE — CATHETER,URETHRAL,REDRUBBER,STRL,16FR: Brand: MEDLINE

## (undated) DEVICE — EVAC BLDR UROVAC W ADAPT

## (undated) DEVICE — TRAP SXN POLYP QUICKCATCH LF

## (undated) DEVICE — NEEDLE,HYPODERM,SAFETY, 22GX1.5: Brand: MEDLINE

## (undated) DEVICE — GLV SURG SIGNATURE ESSENTIAL PF LTX SZ8

## (undated) DEVICE — GW PTFE FIX/CORE FLXTIP .038 3X150CM

## (undated) DEVICE — SOL IRR NACL 0.9PCT BT 1000ML

## (undated) DEVICE — NDL HYPO SFTY GLD 22G 1 1/2IN

## (undated) DEVICE — SOL PVPI SPRY BETADINE 3OZ

## (undated) DEVICE — STERILE POLYISOPRENE POWDER-FREE SURGICAL GLOVES: Brand: PROTEXIS

## (undated) DEVICE — SOL IRRG H2O PL/BG 1000ML STRL

## (undated) DEVICE — 6617 IOBAN II PATIENT ISOLATION DRAPE 5/BX,4BX/CS: Brand: STERI-DRAPE™ IOBAN™ 2

## (undated) DEVICE — SYRINGE,TOOMEY,IRRIGATION,70CC,STERILE: Brand: MEDLINE

## (undated) DEVICE — HOOD WITH PEEL AWAY FACE SHIELD: Brand: T7PLUS

## (undated) DEVICE — CATH URETRL OPEN END W/CONNECT 5F 70CM

## (undated) DEVICE — GLV SURG SENSICARE POLYISPRN W/ALOE PF LF 6.5 GRN STRL